# Patient Record
Sex: FEMALE | Race: WHITE | Employment: UNEMPLOYED | URBAN - NONMETROPOLITAN AREA
[De-identification: names, ages, dates, MRNs, and addresses within clinical notes are randomized per-mention and may not be internally consistent; named-entity substitution may affect disease eponyms.]

---

## 2018-11-01 ENCOUNTER — HOSPITAL ENCOUNTER (OUTPATIENT)
Dept: WOMENS IMAGING | Age: 58
Discharge: HOME | End: 2018-11-01
Payer: COMMERCIAL

## 2018-11-01 DIAGNOSIS — Z12.31 VISIT FOR SCREENING MAMMOGRAM: ICD-10-CM

## 2018-11-01 PROCEDURE — 77067 SCR MAMMO BI INCL CAD: CPT

## 2019-10-25 RX ORDER — PHENOL 1.4 %
600 AEROSOL, SPRAY (ML) MUCOUS MEMBRANE 2 TIMES DAILY WITH MEALS
COMMUNITY

## 2019-10-25 RX ORDER — MULTIPLE VITAMINS W/ MINERALS TAB 9MG-400MCG
1 TAB ORAL DAILY
COMMUNITY

## 2019-10-25 RX ORDER — THYROID, PORCINE 60 MG/1
65 TABLET ORAL DAILY
COMMUNITY

## 2019-10-25 RX ORDER — METFORMIN HYDROCHLORIDE 500 MG/1
500 TABLET, EXTENDED RELEASE ORAL
COMMUNITY
End: 2020-12-10

## 2019-10-25 RX ORDER — ACETAMINOPHEN 325 MG/1
650 TABLET ORAL EVERY 6 HOURS PRN
COMMUNITY

## 2019-10-28 ENCOUNTER — OFFICE VISIT (OUTPATIENT)
Dept: CARDIOLOGY | Facility: CLINIC | Age: 59
End: 2019-10-28

## 2019-10-28 VITALS
BODY MASS INDEX: 29.77 KG/M2 | DIASTOLIC BLOOD PRESSURE: 70 MMHG | OXYGEN SATURATION: 99 % | HEART RATE: 106 BPM | HEIGHT: 63 IN | SYSTOLIC BLOOD PRESSURE: 110 MMHG | WEIGHT: 168 LBS

## 2019-10-28 DIAGNOSIS — R01.1 HEART MURMUR: Primary | ICD-10-CM

## 2019-10-28 DIAGNOSIS — D69.6 THROMBOCYTOPENIA (HCC): Primary | ICD-10-CM

## 2019-10-28 PROCEDURE — 99203 OFFICE O/P NEW LOW 30 MIN: CPT | Performed by: INTERNAL MEDICINE

## 2019-10-28 PROCEDURE — 93000 ELECTROCARDIOGRAM COMPLETE: CPT | Performed by: INTERNAL MEDICINE

## 2019-10-28 RX ORDER — OMEPRAZOLE 20 MG/1
20 CAPSULE, DELAYED RELEASE ORAL DAILY
COMMUNITY
End: 2019-10-29

## 2019-10-28 NOTE — PROGRESS NOTES
"    Subjective:     Encounter Date:10/28/2019      Patient ID: Lucía Wolff is a 59 y.o. female who is referred for further evaluation after abnormal liver function tests were noted, prompting a GI work-up, ultimately being referred here for possible right heart failure.    Referring provider: ANAIS Galvin  Reason for referral: Heart failure    Chief Complaint: \" I had elevated liver function tests.\"    History of Present Illness     This is a 59-year-old female who was found to have elevated liver function test and underwent a work-up for this with negative liver biopsy, thought to possibly have elevated LFTs due to right-sided heart failure.  Therefore, cardiology referral was placed.  The patient says that at this time she has been doing well.  She does not have any peripheral edema.  She does not have any abdominal swelling.  She denies orthopnea, PND.  No lightheadedness, dizziness, syncope.  No chest pain.  She also denies any significant shortness of breath or dyspnea on exertion.  She denies ever having pulmonary hypertension, pulmonary embolism.  She does note a history of a cardiac murmur.  This has not been followed in the past.  Currently, she says that she has been feeling well and doing well.    The following portions of the patient's history were reviewed and updated as appropriate: allergies, current medications, past family history, past medical history, past social history, past surgical history and problem list.     Past Medical History:   Diagnosis Date   • Diabetes mellitus (CMS/HCC)    • Disease of thyroid gland    • Fatty liver    • Heart murmur    • Heart murmur 10/28/2019   • Leukopenia    • Thrombocytopenia (CMS/HCC)      Past Surgical History:   Procedure Laterality Date   • CHOLECYSTECTOMY     • HYSTERECTOMY  2003    PARTIAL   • LIVER BIOPSY  08/12/2019       Current Outpatient Medications:   •  acetaminophen (TYLENOL) 325 MG tablet, Take 650 mg by mouth Every 6 (Six) Hours As " Needed for Mild Pain ., Disp: , Rfl:   •  calcium carbonate (OS-MITRA) 600 MG tablet, Take 600 mg by mouth 2 (Two) Times a Day With Meals., Disp: , Rfl:   •  metFORMIN ER (GLUCOPHAGE-XR) 500 MG 24 hr tablet, Take 500 mg by mouth Daily With Breakfast., Disp: , Rfl:   •  Multiple Vitamins-Minerals (MULTIVITAMIN WITH MINERALS) tablet tablet, Take 1 tablet by mouth Daily., Disp: , Rfl:   •  Omega-3 300 MG capsule, Take 1,200 mg by mouth Daily., Disp: , Rfl:   •  Thyroid 65 MG PO tablet, Take 65 mg by mouth Daily., Disp: , Rfl:   •  VIT B12-METHIONINE-INOS-CHOL IM, Inject 1 mL into the appropriate muscle as directed by prescriber Every 30 (Thirty) Days., Disp: , Rfl:     No Known Allergies    Social History     Tobacco Use   • Smoking status: Never Smoker   • Smokeless tobacco: Never Used   • Tobacco comment: has tried it but did not keep smoking   Substance Use Topics   • Alcohol use: Yes     Comment: very rare     Family History   Problem Relation Age of Onset   • Alzheimer's disease Mother    • Diabetes Father    • Stroke Father    • Cardiomyopathy Father    • Heart attack Father    • Heart disease Father    • Hypertension Father    • Cancer Brother    • Diabetes Brother    • Diabetes Brother    • Hypertension Brother    • No Known Problems Brother      Review of Systems   Constitution: Negative for chills, fever, night sweats and weight loss.   HENT: Negative for congestion and hearing loss.    Eyes: Negative for blurred vision and pain.   Cardiovascular: Negative for chest pain, claudication, dyspnea on exertion, leg swelling, orthopnea, palpitations, paroxysmal nocturnal dyspnea and syncope.   Respiratory: Negative for cough, hemoptysis, shortness of breath and wheezing.    Endocrine: Negative for cold intolerance, heat intolerance, polydipsia and polyuria.   Hematologic/Lymphatic: Negative for adenopathy and bleeding problem. Does not bruise/bleed easily.   Skin: Negative for color change, poor wound healing and  rash.   Musculoskeletal: Negative for arthritis, back pain, joint pain, joint swelling, myalgias and neck pain.   Gastrointestinal: Negative for abdominal pain, change in bowel habit, constipation, diarrhea, heartburn, hematochezia, melena, nausea and vomiting.   Genitourinary: Negative for dysuria, frequency, hematuria and nocturia.   Neurological: Negative for dizziness, focal weakness, headaches, light-headedness, loss of balance and numbness.   Psychiatric/Behavioral: Negative for altered mental status, memory loss and substance abuse.   Allergic/Immunologic: Negative for hives and persistent infections.       ECG 12 Lead  Date/Time: 10/28/2019 3:33 PM  Performed by: Epifanio Rangel MD  Authorized by: Epifanio Rangel MD   Previous ECG: no previous ECG available  Rhythm: sinus rhythm  Rate: normal  Conduction: conduction normal  ST Segments: ST segments normal  QRS axis: normal  Other findings: poor R wave progression    Clinical impression: non-specific ECG             Objective:     Physical Exam   Constitutional: She is oriented to person, place, and time. Vital signs are normal. She appears well-developed and well-nourished. She is cooperative.  Non-toxic appearance. No distress.   HENT:   Head: Normocephalic and atraumatic.   Right Ear: External ear normal.   Left Ear: External ear normal.   Nose: Nose normal.   Mouth/Throat: Uvula is midline, oropharynx is clear and moist and mucous membranes are normal. Mucous membranes are not pale, not dry and not cyanotic. No oropharyngeal exudate.   Eyes: EOM and lids are normal. Pupils are equal, round, and reactive to light.   Neck: Normal range of motion. Neck supple. No hepatojugular reflux and no JVD present. Carotid bruit is not present. No tracheal deviation and no edema present. No thyroid mass and no thyromegaly present.   Cardiovascular: Normal rate, regular rhythm, S1 normal, S2 normal, intact distal pulses and normal pulses.  No  "extrasystoles are present. PMI is not displaced. Exam reveals no gallop and no friction rub.   Murmur heard.   Blowing holosystolic murmur is present at the apex.  Pulses:       Radial pulses are 2+ on the right side, and 2+ on the left side.        Femoral pulses are 2+ on the right side, and 2+ on the left side.       Dorsalis pedis pulses are 2+ on the right side, and 2+ on the left side.        Posterior tibial pulses are 2+ on the right side, and 2+ on the left side.   Pulmonary/Chest: Effort normal and breath sounds normal. No accessory muscle usage. No respiratory distress. She has no wheezes. She has no rales. She exhibits no tenderness.   Abdominal: Soft. Normal appearance and bowel sounds are normal. She exhibits no distension, no abdominal bruit and no pulsatile midline mass. There is no hepatosplenomegaly. There is no tenderness.   Musculoskeletal: Normal range of motion. She exhibits no edema, tenderness or deformity.   Lymphadenopathy:     She has no cervical adenopathy.   Neurological: She is oriented to person, place, and time. She has normal strength. No cranial nerve deficit.   Skin: Skin is warm, dry and intact. No rash noted. She is not diaphoretic. No cyanosis or erythema. Nails show no clubbing.   Psychiatric: She has a normal mood and affect. Her speech is normal and behavior is normal. Thought content normal.   Vitals reviewed.    /70   Pulse 106   Ht 160 cm (63\")   Wt 76.2 kg (168 lb)   SpO2 99%   BMI 29.76 kg/m²     Data/lab Review:     I did review referral records.  The referral was placed due to an abnormal finding on imaging.  Unfortunately, the imaging did not accompany the patient.  There is a mention in the note that the patient was also found to have thrombocytopenia and that a liver biopsy showed no advanced liver disease and no cirrhosis.  Also, in the history of the present illness on the office note from 9/13/2019, it indicates the patient had pathology consistent " with minimal steatosis, less than 5%.  Also, abdominal ultrasound was noted to show no biliary ductal dilatation and possible portal hypertension with portal and splenic veins appearing borderline prominent, suggestive of portal hypertension.      Assessment:          Diagnosis Plan   1. Heart murmur  Adult Transthoracic Echo Complete W/ Cont if Necessary Per Protocol    ECG 12 Lead          Plan:       This patient was referred due to abnormal imaging and a concern for possible right heart failure.  On the clinical examination today in office, the patient exhibits no signs of right heart failure.  She does not have any evidence of elevated jugular venous pulsation, no hepatojugular reflux, no peripheral edema, no abdominal distention or suggestion of ascites.  Therefore, I would not necessarily think that the patient has right heart failure.  However, I did not receive any imaging, and I do not think that the patient has had an echocardiogram and the patient does not recall having an echocardiogram.  Therefore, I do think that an echocardiogram is reasonable to evaluate cardiac structure and function, especially in the setting of a previous cardiac murmur.  I did appreciate a soft murmur today that does not necessarily sound pathologic.  With an echocardiogram, we will be able to evaluate right heart size and function.  If the patient's right heart is of normal size and function, I would not feel that any further cardiac work-up would be indicated.  If there is right heart dilation or suggestion of possible pulmonary hypertension, we may consider right heart catheterization.  Further plans will be pending the results of the patient's echocardiogram.    Patient's Body mass index is 29.76 kg/m². BMI is above normal parameters. Recommendations include: exercise counseling and nutrition counseling.    Follow-up: Pending the results the patient's echocardiogram.

## 2019-10-29 ENCOUNTER — APPOINTMENT (OUTPATIENT)
Dept: LAB | Facility: HOSPITAL | Age: 59
End: 2019-10-29

## 2019-10-29 ENCOUNTER — CONSULT (OUTPATIENT)
Dept: ONCOLOGY | Facility: CLINIC | Age: 59
End: 2019-10-29

## 2019-10-29 ENCOUNTER — TELEPHONE (OUTPATIENT)
Dept: ONCOLOGY | Facility: CLINIC | Age: 59
End: 2019-10-29

## 2019-10-29 VITALS
WEIGHT: 167.5 LBS | DIASTOLIC BLOOD PRESSURE: 65 MMHG | HEART RATE: 91 BPM | OXYGEN SATURATION: 96 % | HEIGHT: 63 IN | SYSTOLIC BLOOD PRESSURE: 100 MMHG | RESPIRATION RATE: 16 BRPM | BODY MASS INDEX: 29.68 KG/M2 | TEMPERATURE: 99.2 F

## 2019-10-29 DIAGNOSIS — D69.6 THROMBOCYTOPENIA (HCC): Primary | ICD-10-CM

## 2019-10-29 LAB
ALBUMIN SERPL-MCNC: 4.8 G/DL (ref 3.5–5.2)
ALBUMIN/GLOB SERPL: 2.1 G/DL
ALP SERPL-CCNC: 77 U/L (ref 39–117)
ALT SERPL W P-5'-P-CCNC: 19 U/L (ref 1–33)
ANION GAP SERPL CALCULATED.3IONS-SCNC: 14 MMOL/L (ref 5–15)
AST SERPL-CCNC: 15 U/L (ref 1–32)
BASOPHILS # BLD AUTO: 0.01 10*3/MM3 (ref 0–0.2)
BASOPHILS NFR BLD AUTO: 0.2 % (ref 0–1.5)
BILIRUB SERPL-MCNC: 1.8 MG/DL (ref 0.2–1.2)
BUN BLD-MCNC: 10 MG/DL (ref 6–20)
BUN/CREAT SERPL: 16.4 (ref 7–25)
CALCIUM SPEC-SCNC: 9.2 MG/DL (ref 8.6–10.5)
CHLORIDE SERPL-SCNC: 102 MMOL/L (ref 98–107)
CO2 SERPL-SCNC: 27 MMOL/L (ref 22–29)
CREAT BLD-MCNC: 0.61 MG/DL (ref 0.57–1)
CYTOLOGIST CVX/VAG CYTO: NORMAL
DEPRECATED RDW RBC AUTO: 47.3 FL (ref 37–54)
EOSINOPHIL # BLD AUTO: 0.07 10*3/MM3 (ref 0–0.4)
EOSINOPHIL NFR BLD AUTO: 1.3 % (ref 0.3–6.2)
ERYTHROCYTE [DISTWIDTH] IN BLOOD BY AUTOMATED COUNT: 15 % (ref 12.3–15.4)
FERRITIN SERPL-MCNC: 418.8 NG/ML (ref 13–150)
GFR SERPL CREATININE-BSD FRML MDRD: 101 ML/MIN/1.73
GLOBULIN UR ELPH-MCNC: 2.3 GM/DL
GLUCOSE BLD-MCNC: 151 MG/DL (ref 65–99)
HCT VFR BLD AUTO: 34.7 % (ref 34–46.6)
HGB BLD-MCNC: 12 G/DL (ref 12–15.9)
LYMPHOCYTES # BLD AUTO: 1.08 10*3/MM3 (ref 0.7–3.1)
LYMPHOCYTES NFR BLD AUTO: 20 % (ref 19.6–45.3)
MCH RBC QN AUTO: 29.6 PG (ref 26.6–33)
MCHC RBC AUTO-ENTMCNC: 34.6 G/DL (ref 31.5–35.7)
MCV RBC AUTO: 85.7 FL (ref 79–97)
MONOCYTES # BLD AUTO: 0.42 10*3/MM3 (ref 0.1–0.9)
MONOCYTES NFR BLD AUTO: 7.8 % (ref 5–12)
NEUTROPHILS # BLD AUTO: 3.8 10*3/MM3 (ref 1.7–7)
NEUTROPHILS NFR BLD AUTO: 70.3 % (ref 42.7–76)
PATH INTERP BLD-IMP: NORMAL
PLATELET # BLD AUTO: 125 10*3/MM3 (ref 140–450)
PMV BLD AUTO: 10.8 FL (ref 6–12)
POTASSIUM BLD-SCNC: 4 MMOL/L (ref 3.5–5.2)
PROT SERPL-MCNC: 7.1 G/DL (ref 6–8.5)
RBC # BLD AUTO: 4.05 10*6/MM3 (ref 3.77–5.28)
SODIUM BLD-SCNC: 143 MMOL/L (ref 136–145)
WBC NRBC COR # BLD: 5.4 10*3/MM3 (ref 3.4–10.8)

## 2019-10-29 PROCEDURE — 85060 BLOOD SMEAR INTERPRETATION: CPT | Performed by: INTERNAL MEDICINE

## 2019-10-29 PROCEDURE — 80053 COMPREHEN METABOLIC PANEL: CPT | Performed by: INTERNAL MEDICINE

## 2019-10-29 PROCEDURE — 85025 COMPLETE CBC W/AUTO DIFF WBC: CPT | Performed by: INTERNAL MEDICINE

## 2019-10-29 PROCEDURE — 36415 COLL VENOUS BLD VENIPUNCTURE: CPT | Performed by: INTERNAL MEDICINE

## 2019-10-29 PROCEDURE — 99205 OFFICE O/P NEW HI 60 MIN: CPT | Performed by: INTERNAL MEDICINE

## 2019-10-29 PROCEDURE — 82728 ASSAY OF FERRITIN: CPT | Performed by: INTERNAL MEDICINE

## 2019-10-29 NOTE — TELEPHONE ENCOUNTER
----- Message from Terra Warren MD sent at 10/29/2019  2:27 PM CDT -----  plz get her egd, scope and liver biopsy for me. Thanks.

## 2019-10-29 NOTE — PROGRESS NOTES
[facility]  HEMATOLOGY & ONCOLOGY        Subjective     VISIT DIAGNOSIS:   Encounter Diagnosis   Name Primary?   • Thrombocytopenia (CMS/HCC) Yes       REASON FOR VISIT:     Chief Complaint   Patient presents with   • thrombocytopenia     initial visit   • Dep;ression Screening     no depression        HEMATOLOGY / ONCOLOGY HISTORY:    No history exists.     [No treatment plan]  Cancer Staging Information:  Cancer Staging  No matching staging information was found for the patient.      INTERVAL HISTORY  Patient ID: Lucía Wolff is a 59 y.o. year old female referred to me for thrombocytopenia by her gastro. Per pt s/p egd 2 months ago and c-scope a year ago. S/p liver biopsy per pt fatty liver. Will obtain records. She denies n/v, ELVIN, abdominal pain, sob, cp, night sweats, unintentional weight loss, focal weakness. Ros unremarkable. PE non focal.        Review of Systems     See above. Otherwise neg    Medications:    Current Outpatient Medications   Medication Sig Dispense Refill   • acetaminophen (TYLENOL) 325 MG tablet Take 650 mg by mouth Every 6 (Six) Hours As Needed for Mild Pain .     • calcium carbonate (OS-MITRA) 600 MG tablet Take 600 mg by mouth 2 (Two) Times a Day With Meals.     • metFORMIN ER (GLUCOPHAGE-XR) 500 MG 24 hr tablet Take 500 mg by mouth Daily With Breakfast.     • Multiple Vitamins-Minerals (MULTIVITAMIN WITH MINERALS) tablet tablet Take 1 tablet by mouth Daily.     • Omega-3 300 MG capsule Take 1,200 mg by mouth Daily.     • Thyroid 65 MG PO tablet Take 65 mg by mouth Daily.     • VIT B12-METHIONINE-INOS-CHOL IM Inject 1 mL into the appropriate muscle as directed by prescriber Every 30 (Thirty) Days.       No current facility-administered medications for this visit.        ALLERGIES:  No Known Allergies    Objective      Vitals:    10/29/19 1038   BP: 100/65   Pulse: 91   Resp: 16   Temp: 99.2 °F (37.3 °C)   SpO2: 96%       Current Status 10/29/2019   ECOG score 0        General Appearance: Patient is awake, alert, oriented and in no acute distress. Patient is welldeveloped, wellnourished, and appears stated age.  HEENT: Normocephalic. Sclerae clear, conjunctiva pink, extraocular movements intact, pupils, round, reactive to light and  accommodation. Mouth and throat are clear with moist oral mucosa.  NECK: Supple, no jugular venous distention, thyroid not enlarged.  LYMPH: No cervical, supraclavicular, axillary, or inguinal lymphadenopathy.  CHEST: Equal bilateral expansion, AP  diameter normal, resonant percussion note  LUNGS: Good air movement, no rales, rhonchi, rubs or wheezes with auscultation  CARDIO: Regular sinus rhythm, no murmurs, gallops or rubs.  ABDOMEN: Nondistended, soft, No tenderness, no guarding, no rebound, No hepatosplenomegaly. No abdominal masses. Bowel sounds positive. No hernia  GENITALIA: Not examined.  BREASTS: Not examined.  MUSKEL: No joint swelling, decreased motion, or inflammation  EXTREMS: No edema, clubbing, cyanosis, No varicose veins.  NEURO: Grossly nonfocal, Gait is coordinated and smooth, Cognition is preserved.  SKIN: No rashes, no ecchymoses, no petechia.  PSYCH: Oriented to time, place and person. Memory is preserved. Mood and affect appear normal      RECENT LABS:  Consult on 10/29/2019   Component Date Value Ref Range Status   • Ferritin 10/29/2019 418.80* 13.00 - 150.00 ng/mL Final   Orders Only on 10/28/2019   Component Date Value Ref Range Status   • Glucose 10/29/2019 151* 65 - 99 mg/dL Final   • BUN 10/29/2019 10  6 - 20 mg/dL Final   • Creatinine 10/29/2019 0.61  0.57 - 1.00 mg/dL Final   • Sodium 10/29/2019 143  136 - 145 mmol/L Final   • Potassium 10/29/2019 4.0  3.5 - 5.2 mmol/L Final   • Chloride 10/29/2019 102  98 - 107 mmol/L Final   • CO2 10/29/2019 27.0  22.0 - 29.0 mmol/L Final   • Calcium 10/29/2019 9.2  8.6 - 10.5 mg/dL Final   • Total Protein 10/29/2019 7.1  6.0 - 8.5 g/dL Final   • Albumin 10/29/2019 4.80  3.50 -  5.20 g/dL Final   • ALT (SGPT) 10/29/2019 19  1 - 33 U/L Final   • AST (SGOT) 10/29/2019 15  1 - 32 U/L Final   • Alkaline Phosphatase 10/29/2019 77  39 - 117 U/L Final   • Total Bilirubin 10/29/2019 1.8* 0.2 - 1.2 mg/dL Final   • eGFR Non African Amer 10/29/2019 101  >60 mL/min/1.73 Final   • Globulin 10/29/2019 2.3  gm/dL Final   • A/G Ratio 10/29/2019 2.1  g/dL Final   • BUN/Creatinine Ratio 10/29/2019 16.4  7.0 - 25.0 Final   • Anion Gap 10/29/2019 14.0  5.0 - 15.0 mmol/L Final   • WBC 10/29/2019 5.40  3.40 - 10.80 10*3/mm3 Final   • RBC 10/29/2019 4.05  3.77 - 5.28 10*6/mm3 Final   • Hemoglobin 10/29/2019 12.0  12.0 - 15.9 g/dL Final   • Hematocrit 10/29/2019 34.7  34.0 - 46.6 % Final   • MCV 10/29/2019 85.7  79.0 - 97.0 fL Final   • MCH 10/29/2019 29.6  26.6 - 33.0 pg Final   • MCHC 10/29/2019 34.6  31.5 - 35.7 g/dL Final   • RDW 10/29/2019 15.0  12.3 - 15.4 % Final   • RDW-SD 10/29/2019 47.3  37.0 - 54.0 fl Final   • MPV 10/29/2019 10.8  6.0 - 12.0 fL Final   • Platelets 10/29/2019 125* 140 - 450 10*3/mm3 Final   • Neutrophil % 10/29/2019 70.3  42.7 - 76.0 % Final   • Lymphocyte % 10/29/2019 20.0  19.6 - 45.3 % Final   • Monocyte % 10/29/2019 7.8  5.0 - 12.0 % Final   • Eosinophil % 10/29/2019 1.3  0.3 - 6.2 % Final   • Basophil % 10/29/2019 0.2  0.0 - 1.5 % Final   • Neutrophils, Absolute 10/29/2019 3.80  1.70 - 7.00 10*3/mm3 Final   • Lymphocytes, Absolute 10/29/2019 1.08  0.70 - 3.10 10*3/mm3 Final   • Monocytes, Absolute 10/29/2019 0.42  0.10 - 0.90 10*3/mm3 Final   • Eosinophils, Absolute 10/29/2019 0.07  0.00 - 0.40 10*3/mm3 Final   • Basophils, Absolute 10/29/2019 0.01  0.00 - 0.20 10*3/mm3 Final   • Performed by: 10/29/2019 Salome Burrell M.D.   Final   • Pathologist Interpretation 10/29/2019 Neutrophils 76%  Bands 1%  Lymphocytes 16%  Monocytes 5%  Eosinophils 2%  Peripheral thrombocytopenia, mild.    No schistocytes seen.  No platelet clumps seen.  No morulae seen in granulocytes or monocytes.    Final       RADIOLOGY:  No results found.         Assessment/Plan 59 yof referred for thrombocytopenia    1.Thrombocytopenia: labs reviewed with pt plt 125, wbc 5.40, Hg 12.0  Reviewed the etiology of thrombocytopenia, nutritional vs HSM, vs primary bone marrow issues  -check PBS, ferritin, b12, folate, bone marrow bx, US abdomen to r/o HSM  -      2. DM: on metformin    3. Hypothyroidism: on thyroid medication         Time Spent: 60 minutes; greater than  50% of time was spent in patient counseling and care coordination.     Terra Warren MD    10/29/2019    2:26 PM

## 2019-10-31 ENCOUNTER — APPOINTMENT (OUTPATIENT)
Dept: ULTRASOUND IMAGING | Facility: HOSPITAL | Age: 59
End: 2019-10-31

## 2019-10-31 ENCOUNTER — HOSPITAL ENCOUNTER (OUTPATIENT)
Dept: CT IMAGING | Facility: HOSPITAL | Age: 59
Discharge: HOME | End: 2019-10-31
Admitting: INTERNAL MEDICINE

## 2019-10-31 VITALS
DIASTOLIC BLOOD PRESSURE: 65 MMHG | SYSTOLIC BLOOD PRESSURE: 109 MMHG | TEMPERATURE: 98.4 F | OXYGEN SATURATION: 98 % | HEART RATE: 85 BPM | WEIGHT: 167.6 LBS | BODY MASS INDEX: 27.92 KG/M2 | HEIGHT: 65 IN | RESPIRATION RATE: 18 BRPM

## 2019-10-31 DIAGNOSIS — D69.6 THROMBOCYTOPENIA (HCC): ICD-10-CM

## 2019-10-31 LAB
BASOPHILS # BLD AUTO: 0 10*3/MM3 (ref 0–0.2)
BASOPHILS NFR BLD AUTO: 0 % (ref 0–1.5)
DEPRECATED RDW RBC AUTO: 46.2 FL (ref 37–54)
EOSINOPHIL # BLD AUTO: 0.05 10*3/MM3 (ref 0–0.4)
EOSINOPHIL NFR BLD AUTO: 1.3 % (ref 0.3–6.2)
ERYTHROCYTE [DISTWIDTH] IN BLOOD BY AUTOMATED COUNT: 14.9 % (ref 12.3–15.4)
HCT VFR BLD AUTO: 33.7 % (ref 34–46.6)
HGB BLD-MCNC: 11.9 G/DL (ref 12–15.9)
IMM GRANULOCYTES # BLD AUTO: 0.02 10*3/MM3 (ref 0–0.05)
IMM GRANULOCYTES NFR BLD AUTO: 0.5 % (ref 0–0.5)
LYMPHOCYTES # BLD AUTO: 0.85 10*3/MM3 (ref 0.7–3.1)
LYMPHOCYTES NFR BLD AUTO: 21.7 % (ref 19.6–45.3)
MCH RBC QN AUTO: 30.1 PG (ref 26.6–33)
MCHC RBC AUTO-ENTMCNC: 35.3 G/DL (ref 31.5–35.7)
MCV RBC AUTO: 85.3 FL (ref 79–97)
MONOCYTES # BLD AUTO: 0.26 10*3/MM3 (ref 0.1–0.9)
MONOCYTES NFR BLD AUTO: 6.6 % (ref 5–12)
NEUTROPHILS # BLD AUTO: 2.74 10*3/MM3 (ref 1.7–7)
NEUTROPHILS NFR BLD AUTO: 69.9 % (ref 42.7–76)
NRBC BLD AUTO-RTO: 0 /100 WBC (ref 0–0.2)
PLATELET # BLD AUTO: 122 10*3/MM3 (ref 140–450)
PMV BLD AUTO: 10.8 FL (ref 6–12)
RBC # BLD AUTO: 3.95 10*6/MM3 (ref 3.77–5.28)
WBC NRBC COR # BLD: 3.92 10*3/MM3 (ref 3.4–10.8)

## 2019-10-31 PROCEDURE — 88305 TISSUE EXAM BY PATHOLOGIST: CPT | Performed by: INTERNAL MEDICINE

## 2019-10-31 PROCEDURE — 25010000002 MIDAZOLAM PER 1 MG: Performed by: RADIOLOGY

## 2019-10-31 PROCEDURE — 25010000002 FENTANYL CITRATE (PF) 100 MCG/2ML SOLUTION: Performed by: RADIOLOGY

## 2019-10-31 PROCEDURE — 25010000003 LIDOCAINE 1 % SOLUTION: Performed by: RADIOLOGY

## 2019-10-31 PROCEDURE — 88313 SPECIAL STAINS GROUP 2: CPT | Performed by: INTERNAL MEDICINE

## 2019-10-31 PROCEDURE — 77012 CT SCAN FOR NEEDLE BIOPSY: CPT

## 2019-10-31 PROCEDURE — 85025 COMPLETE CBC W/AUTO DIFF WBC: CPT | Performed by: INTERNAL MEDICINE

## 2019-10-31 PROCEDURE — 88311 DECALCIFY TISSUE: CPT | Performed by: INTERNAL MEDICINE

## 2019-10-31 RX ORDER — MIDAZOLAM HYDROCHLORIDE 1 MG/ML
INJECTION INTRAMUSCULAR; INTRAVENOUS
Status: COMPLETED | OUTPATIENT
Start: 2019-10-31 | End: 2019-10-31

## 2019-10-31 RX ORDER — FENTANYL CITRATE 50 UG/ML
INJECTION, SOLUTION INTRAMUSCULAR; INTRAVENOUS
Status: COMPLETED | OUTPATIENT
Start: 2019-10-31 | End: 2019-10-31

## 2019-10-31 RX ORDER — OMEPRAZOLE 20 MG/1
20 CAPSULE, DELAYED RELEASE ORAL DAILY PRN
COMMUNITY
End: 2022-01-17

## 2019-10-31 RX ORDER — LIDOCAINE HYDROCHLORIDE 10 MG/ML
INJECTION, SOLUTION INFILTRATION; PERINEURAL
Status: COMPLETED | OUTPATIENT
Start: 2019-10-31 | End: 2019-10-31

## 2019-10-31 RX ADMIN — LIDOCAINE HYDROCHLORIDE 10 ML: 10 INJECTION, SOLUTION INFILTRATION; PERINEURAL at 11:27

## 2019-10-31 RX ADMIN — MIDAZOLAM 1 MG: 1 INJECTION INTRAMUSCULAR; INTRAVENOUS at 11:26

## 2019-10-31 RX ADMIN — FENTANYL CITRATE 100 MCG: 50 INJECTION, SOLUTION INTRAMUSCULAR; INTRAVENOUS at 11:26

## 2019-11-04 ENCOUNTER — HOSPITAL ENCOUNTER (OUTPATIENT)
Dept: CARDIOLOGY | Facility: HOSPITAL | Age: 59
Discharge: HOME | End: 2019-11-04
Admitting: INTERNAL MEDICINE

## 2019-11-04 ENCOUNTER — HOSPITAL ENCOUNTER (OUTPATIENT)
Dept: ULTRASOUND IMAGING | Facility: HOSPITAL | Age: 59
Discharge: HOME | End: 2019-11-04

## 2019-11-04 VITALS
SYSTOLIC BLOOD PRESSURE: 126 MMHG | DIASTOLIC BLOOD PRESSURE: 52 MMHG | BODY MASS INDEX: 27.92 KG/M2 | HEIGHT: 65 IN | WEIGHT: 167.55 LBS

## 2019-11-04 DIAGNOSIS — D69.6 THROMBOCYTOPENIA (HCC): ICD-10-CM

## 2019-11-04 DIAGNOSIS — R01.1 HEART MURMUR: ICD-10-CM

## 2019-11-04 PROCEDURE — 76700 US EXAM ABDOM COMPLETE: CPT

## 2019-11-04 PROCEDURE — 93306 TTE W/DOPPLER COMPLETE: CPT

## 2019-11-04 PROCEDURE — 93306 TTE W/DOPPLER COMPLETE: CPT | Performed by: INTERNAL MEDICINE

## 2019-11-08 LAB
BH CV ECHO MEAS - AO MAX PG (FULL): 2.9 MMHG
BH CV ECHO MEAS - AO MAX PG: 6.7 MMHG
BH CV ECHO MEAS - AO MEAN PG (FULL): 1 MMHG
BH CV ECHO MEAS - AO MEAN PG: 3 MMHG
BH CV ECHO MEAS - AO ROOT AREA (BSA CORRECTED): 1.5
BH CV ECHO MEAS - AO ROOT AREA: 5.7 CM^2
BH CV ECHO MEAS - AO ROOT DIAM: 2.7 CM
BH CV ECHO MEAS - AO V2 MAX: 129 CM/SEC
BH CV ECHO MEAS - AO V2 MEAN: 86.2 CM/SEC
BH CV ECHO MEAS - AO V2 VTI: 25.8 CM
BH CV ECHO MEAS - AVA(I,A): 2.9 CM^2
BH CV ECHO MEAS - AVA(I,D): 2.9 CM^2
BH CV ECHO MEAS - AVA(V,A): 2.4 CM^2
BH CV ECHO MEAS - AVA(V,D): 2.4 CM^2
BH CV ECHO MEAS - BSA(HAYCOCK): 1.9 M^2
BH CV ECHO MEAS - BSA: 1.8 M^2
BH CV ECHO MEAS - BZI_BMI: 27.8 KILOGRAMS/M^2
BH CV ECHO MEAS - BZI_METRIC_HEIGHT: 165.1 CM
BH CV ECHO MEAS - BZI_METRIC_WEIGHT: 75.8 KG
BH CV ECHO MEAS - EDV(CUBED): 84.6 ML
BH CV ECHO MEAS - EDV(MOD-SP4): 61.8 ML
BH CV ECHO MEAS - EDV(TEICH): 87.2 ML
BH CV ECHO MEAS - EF(CUBED): 72.6 %
BH CV ECHO MEAS - EF(MOD-SP4): 59.4 %
BH CV ECHO MEAS - EF(TEICH): 64.6 %
BH CV ECHO MEAS - ESV(CUBED): 23.1 ML
BH CV ECHO MEAS - ESV(MOD-SP4): 25.1 ML
BH CV ECHO MEAS - ESV(TEICH): 30.9 ML
BH CV ECHO MEAS - FS: 35.1 %
BH CV ECHO MEAS - IVS/LVPW: 0.94
BH CV ECHO MEAS - IVSD: 0.87 CM
BH CV ECHO MEAS - LA DIMENSION: 3.7 CM
BH CV ECHO MEAS - LA/AO: 1.4
BH CV ECHO MEAS - LAT PEAK E' VEL: 8.2 CM/SEC
BH CV ECHO MEAS - LV DIASTOLIC VOL/BSA (35-75): 33.7 ML/M^2
BH CV ECHO MEAS - LV MASS(C)D: 126.7 GRAMS
BH CV ECHO MEAS - LV MASS(C)DI: 69.1 GRAMS/M^2
BH CV ECHO MEAS - LV MAX PG: 3.7 MMHG
BH CV ECHO MEAS - LV MEAN PG: 2 MMHG
BH CV ECHO MEAS - LV SYSTOLIC VOL/BSA (12-30): 13.7 ML/M^2
BH CV ECHO MEAS - LV V1 MAX: 96.8 CM/SEC
BH CV ECHO MEAS - LV V1 MEAN: 66.2 CM/SEC
BH CV ECHO MEAS - LV V1 VTI: 24 CM
BH CV ECHO MEAS - LVIDD: 4.4 CM
BH CV ECHO MEAS - LVIDS: 2.9 CM
BH CV ECHO MEAS - LVLD AP4: 7.8 CM
BH CV ECHO MEAS - LVLS AP4: 5.6 CM
BH CV ECHO MEAS - LVOT AREA (M): 3.1 CM^2
BH CV ECHO MEAS - LVOT AREA: 3.1 CM^2
BH CV ECHO MEAS - LVOT DIAM: 2 CM
BH CV ECHO MEAS - LVPWD: 0.92 CM
BH CV ECHO MEAS - MED PEAK E' VEL: 5.87 CM/SEC
BH CV ECHO MEAS - MV A MAX VEL: 91.2 CM/SEC
BH CV ECHO MEAS - MV DEC TIME: 0.17 SEC
BH CV ECHO MEAS - MV E MAX VEL: 93.1 CM/SEC
BH CV ECHO MEAS - MV E/A: 1
BH CV ECHO MEAS - SI(AO): 80.6 ML/M^2
BH CV ECHO MEAS - SI(CUBED): 33.5 ML/M^2
BH CV ECHO MEAS - SI(LVOT): 41.2 ML/M^2
BH CV ECHO MEAS - SI(MOD-SP4): 20 ML/M^2
BH CV ECHO MEAS - SI(TEICH): 30.8 ML/M^2
BH CV ECHO MEAS - SV(AO): 147.7 ML
BH CV ECHO MEAS - SV(CUBED): 61.5 ML
BH CV ECHO MEAS - SV(LVOT): 75.4 ML
BH CV ECHO MEAS - SV(MOD-SP4): 36.7 ML
BH CV ECHO MEAS - SV(TEICH): 56.4 ML
BH CV ECHO MEASUREMENTS AVERAGE E/E' RATIO: 13.23
LEFT ATRIUM VOLUME INDEX: 26.7 ML/M2
LEFT ATRIUM VOLUME: 48.9 CM3
MAXIMAL PREDICTED HEART RATE: 163 BPM
STRESS TARGET HR: 139 BPM

## 2019-11-13 LAB
CYTO UR: NORMAL
LAB AP CASE REPORT: NORMAL
PATH REPORT.FINAL DX SPEC: NORMAL
PATH REPORT.GROSS SPEC: NORMAL

## 2019-11-22 ENCOUNTER — APPOINTMENT (OUTPATIENT)
Dept: LAB | Facility: HOSPITAL | Age: 59
End: 2019-11-22

## 2019-11-22 ENCOUNTER — OFFICE VISIT (OUTPATIENT)
Dept: ONCOLOGY | Facility: CLINIC | Age: 59
End: 2019-11-22

## 2019-11-22 VITALS
SYSTOLIC BLOOD PRESSURE: 122 MMHG | OXYGEN SATURATION: 97 % | RESPIRATION RATE: 16 BRPM | WEIGHT: 165.4 LBS | DIASTOLIC BLOOD PRESSURE: 68 MMHG | HEART RATE: 72 BPM | TEMPERATURE: 98.4 F | HEIGHT: 65 IN | BODY MASS INDEX: 27.56 KG/M2

## 2019-11-22 DIAGNOSIS — D69.6 THROMBOCYTOPENIA (HCC): Primary | ICD-10-CM

## 2019-11-22 LAB
ALBUMIN SERPL-MCNC: 4.9 G/DL (ref 3.5–5.2)
ALBUMIN/GLOB SERPL: 2.6 G/DL
ALP SERPL-CCNC: 75 U/L (ref 39–117)
ALT SERPL W P-5'-P-CCNC: 41 U/L (ref 1–33)
ANION GAP SERPL CALCULATED.3IONS-SCNC: 10 MMOL/L (ref 5–15)
AST SERPL-CCNC: 22 U/L (ref 1–32)
BASOPHILS # BLD AUTO: 0.01 10*3/MM3 (ref 0–0.2)
BASOPHILS NFR BLD AUTO: 0.3 % (ref 0–1.5)
BILIRUB SERPL-MCNC: 1.5 MG/DL (ref 0.2–1.2)
BUN BLD-MCNC: 12 MG/DL (ref 6–20)
BUN/CREAT SERPL: 19.7 (ref 7–25)
CALCIUM SPEC-SCNC: 9.5 MG/DL (ref 8.6–10.5)
CHLORIDE SERPL-SCNC: 104 MMOL/L (ref 98–107)
CO2 SERPL-SCNC: 27 MMOL/L (ref 22–29)
CREAT BLD-MCNC: 0.61 MG/DL (ref 0.57–1)
DEPRECATED RDW RBC AUTO: 46.2 FL (ref 37–54)
EOSINOPHIL # BLD AUTO: 0.06 10*3/MM3 (ref 0–0.4)
EOSINOPHIL NFR BLD AUTO: 2 % (ref 0.3–6.2)
ERYTHROCYTE [DISTWIDTH] IN BLOOD BY AUTOMATED COUNT: 15 % (ref 12.3–15.4)
GFR SERPL CREATININE-BSD FRML MDRD: 101 ML/MIN/1.73
GLOBULIN UR ELPH-MCNC: 1.9 GM/DL
GLUCOSE BLD-MCNC: 127 MG/DL (ref 65–99)
HCT VFR BLD AUTO: 34.8 % (ref 34–46.6)
HGB BLD-MCNC: 12 G/DL (ref 12–15.9)
HOLD SPECIMEN: NORMAL
HOLD SPECIMEN: NORMAL
LYMPHOCYTES # BLD AUTO: 0.89 10*3/MM3 (ref 0.7–3.1)
LYMPHOCYTES NFR BLD AUTO: 29.7 % (ref 19.6–45.3)
MCH RBC QN AUTO: 29.4 PG (ref 26.6–33)
MCHC RBC AUTO-ENTMCNC: 34.5 G/DL (ref 31.5–35.7)
MCV RBC AUTO: 85.3 FL (ref 79–97)
MONOCYTES # BLD AUTO: 0.18 10*3/MM3 (ref 0.1–0.9)
MONOCYTES NFR BLD AUTO: 6 % (ref 5–12)
NEUTROPHILS # BLD AUTO: 1.85 10*3/MM3 (ref 1.7–7)
NEUTROPHILS NFR BLD AUTO: 61.7 % (ref 42.7–76)
PLATELET # BLD AUTO: 111 10*3/MM3 (ref 140–450)
PMV BLD AUTO: 10.8 FL (ref 6–12)
POTASSIUM BLD-SCNC: 4.3 MMOL/L (ref 3.5–5.2)
PROT SERPL-MCNC: 6.8 G/DL (ref 6–8.5)
RBC # BLD AUTO: 4.08 10*6/MM3 (ref 3.77–5.28)
SODIUM BLD-SCNC: 141 MMOL/L (ref 136–145)
WBC NRBC COR # BLD: 3 10*3/MM3 (ref 3.4–10.8)

## 2019-11-22 PROCEDURE — 36415 COLL VENOUS BLD VENIPUNCTURE: CPT | Performed by: INTERNAL MEDICINE

## 2019-11-22 PROCEDURE — 99214 OFFICE O/P EST MOD 30 MIN: CPT | Performed by: INTERNAL MEDICINE

## 2019-11-22 PROCEDURE — 80053 COMPREHEN METABOLIC PANEL: CPT | Performed by: INTERNAL MEDICINE

## 2019-11-22 PROCEDURE — 85025 COMPLETE CBC W/AUTO DIFF WBC: CPT | Performed by: INTERNAL MEDICINE

## 2019-11-22 NOTE — PROGRESS NOTES
[facility]  HEMATOLOGY & ONCOLOGY        Subjective     VISIT DIAGNOSIS:   No diagnosis found.    REASON FOR VISIT:     Chief Complaint   Patient presents with   • Thrombocytopenia     Here for f/u        HEMATOLOGY / ONCOLOGY HISTORY:    No history exists.     [No treatment plan]  Cancer Staging Information:  Cancer Staging  No matching staging information was found for the patient.      INTERVAL HISTORY  Patient ID: Lucía Wolff is a 59 y.o. year old female referred to me for thrombocytopenia by her gastro. Per pt s/p egd 2 months ago and c-scope a year ago. S/p liver biopsy per pt fatty liver. Will obtain records. She denies n/v, ELVIN, abdominal pain, sob, cp, night sweats, unintentional weight loss, focal weakness. Ros unremarkable. PE non focal.  11/22/19: she presents to review bone marrow bx which was Normocellular marrow with maturing trilineage hematopoiesis.  No morphologic evidence of overt or advanced myelodysplasia, acute leukemia, metastatic neoplasm or plasma cell  dyscrasia.  A small monoclonal B-cell population identified by flow cytometry (approximately 2%), see comment.  Stainable iron present.  No significant fibrosis.  Denies sob/cp/n/v/fever/night sweats/weight loss/ELVIN/early satiety/abdominal pain. Ros unremarkable. PE remains the same.      Review of Systems     See above. Otherwise neg    Medications:    Current Outpatient Medications   Medication Sig Dispense Refill   • acetaminophen (TYLENOL) 325 MG tablet Take 650 mg by mouth Every 6 (Six) Hours As Needed for Mild Pain .     • calcium carbonate (OS-MITRA) 600 MG tablet Take 600 mg by mouth 2 (Two) Times a Day With Meals.     • Dextromethorphan-guaiFENesin (MUCINEX DM PO) Take 1 tablet by mouth 2 (Two) Times a Day As Needed.     • metFORMIN ER (GLUCOPHAGE-XR) 500 MG 24 hr tablet Take 500 mg by mouth Daily With Breakfast.     • Multiple Vitamins-Minerals (MULTIVITAMIN WITH MINERALS) tablet tablet Take 1 tablet by mouth  Daily.     • Omega-3 300 MG capsule Take 1,200 mg by mouth Daily.     • omeprazole (priLOSEC) 20 MG capsule Take 20 mg by mouth Daily As Needed.     • Thyroid 65 MG PO tablet Take 65 mg by mouth Daily.     • VIT B12-METHIONINE-INOS-CHOL IM Inject 1 mL into the appropriate muscle as directed by prescriber Every 30 (Thirty) Days.       No current facility-administered medications for this visit.        ALLERGIES:  No Known Allergies    Objective      Vitals:    11/22/19 1259   BP: 122/68   Pulse: 72   Resp: 16   Temp: 98.4 °F (36.9 °C)   SpO2: 97%       Current Status 11/22/2019   ECOG score 0       General Appearance: Patient is awake, alert, oriented and in no acute distress. Patient is welldeveloped, wellnourished, and appears stated age.  HEENT: Normocephalic. Sclerae clear, conjunctiva pink, extraocular movements intact, pupils, round, reactive to light and  accommodation. Mouth and throat are clear with moist oral mucosa.  NECK: Supple, no jugular venous distention, thyroid not enlarged.  LYMPH: No cervical, supraclavicular, axillary, or inguinal lymphadenopathy.  CHEST: Equal bilateral expansion, AP  diameter normal, resonant percussion note  LUNGS: Good air movement, no rales, rhonchi, rubs or wheezes with auscultation  CARDIO: Regular sinus rhythm, no murmurs, gallops or rubs.  ABDOMEN: Nondistended, soft, No tenderness, no guarding, no rebound, No hepatosplenomegaly. No abdominal masses. Bowel sounds positive. No hernia  GENITALIA: Not examined.  BREASTS: Not examined.  MUSKEL: No joint swelling, decreased motion, or inflammation  EXTREMS: No edema, clubbing, cyanosis, No varicose veins.  NEURO: Grossly nonfocal, Gait is coordinated and smooth, Cognition is preserved.  SKIN: No rashes, no ecchymoses, no petechia.  PSYCH: Oriented to time, place and person. Memory is preserved. Mood and affect appear normal      RECENT LABS:  Orders Only on 11/22/2019   Component Date Value Ref Range Status   • WBC 11/22/2019  3.00* 3.40 - 10.80 10*3/mm3 Final   • RBC 11/22/2019 4.08  3.77 - 5.28 10*6/mm3 Final   • Hemoglobin 11/22/2019 12.0  12.0 - 15.9 g/dL Final   • Hematocrit 11/22/2019 34.8  34.0 - 46.6 % Final   • MCV 11/22/2019 85.3  79.0 - 97.0 fL Final   • MCH 11/22/2019 29.4  26.6 - 33.0 pg Final   • MCHC 11/22/2019 34.5  31.5 - 35.7 g/dL Final   • RDW 11/22/2019 15.0  12.3 - 15.4 % Final   • RDW-SD 11/22/2019 46.2  37.0 - 54.0 fl Final   • MPV 11/22/2019 10.8  6.0 - 12.0 fL Final   • Platelets 11/22/2019 111* 140 - 450 10*3/mm3 Final   • Neutrophil % 11/22/2019 61.7  42.7 - 76.0 % Final   • Lymphocyte % 11/22/2019 29.7  19.6 - 45.3 % Final   • Monocyte % 11/22/2019 6.0  5.0 - 12.0 % Final   • Eosinophil % 11/22/2019 2.0  0.3 - 6.2 % Final   • Basophil % 11/22/2019 0.3  0.0 - 1.5 % Final   • Neutrophils, Absolute 11/22/2019 1.85  1.70 - 7.00 10*3/mm3 Final   • Lymphocytes, Absolute 11/22/2019 0.89  0.70 - 3.10 10*3/mm3 Final   • Monocytes, Absolute 11/22/2019 0.18  0.10 - 0.90 10*3/mm3 Final   • Eosinophils, Absolute 11/22/2019 0.06  0.00 - 0.40 10*3/mm3 Final   • Basophils, Absolute 11/22/2019 0.01  0.00 - 0.20 10*3/mm3 Final       RADIOLOGY:  Us Abdomen Complete    Result Date: 11/4/2019  Narrative: ULTRASOUND ABDOMEN COMPLETE 11/4/2019 12:22 PM CST  REASON FOR EXAM: thrombocytopenia r/o HSM; D69.6-Thrombocytopenia, unspecified   COMPARISON: None  TECHNIQUE: Multiple longitudinal and transverse real-time sonographic images of the abdomen are obtained.  FINDINGS:   LIVER: Normal in size, smooth in contour, and homogeneous in echogenicity. Hepatopedal flow is present in the portal vein No focal lesion is seen.  BILIARY: The gallbladder is surgically absent The common bile duct measures 0.39 cm in diameter. There is no evidence of intrahepatic ductal dilatation.   KIDNEYS: The right and left kidneys are normal in size, shape, orientation, and position measuring 4.6 x 4.6 x 11.6 cm and 3.7 x 3.9 x 10.4 cm, respectively. The  corticomedullary differentiation is maintained. There is no evidence of nephrolithiasis, hydronephrosis or perinephric fluid collection. No renal mass is seen. No hydroureter is seen.  PANCREAS: No focal lesion or abnormality.  SPLEEN: Spleen is mildly enlarged 6.3 x 6.4 x 17.5 cm  OTHER: No ascites is present. The visualized IVC and aorta are normal in appearance.  Scanning through the pelvis reveals anechoic urine partially distending the bladder. There are no free fluid collections.      Impression: 1. Spleen is mildly enlarged.  This report was finalized on 11/04/2019 13:24 by Dr. Chapo Kelly MD.    Ct Guided Biopsy Bone Marrow    Result Date: 10/31/2019  Narrative: CT GUIDED BIOPSY BONE MARROW- 10/31/2019 11:10 AM CDT  Indication: thrombocytopenia. r/o leuk/lymph, MDS; D69.6-Thrombocytopenia, unspecified  Consent: An informed written consent was obtained from the patient after discussing the risks, benefits, potential complications and alternatives. All questions answered to the patient's satisfaction.   Complications: None.  Medications: Versed 1 mg IV, Fentanyl 100 mcg IV.  DLP: 353 mGy cm  Estimated Blood Loss: Less than 5 ml.  Procedure: An informed consent was obtained as above. Intravenous conscious sedation was administered with blood pressure, EKG and pulse oximeter monitoring by nursing staff throughout the procedure. Prior to sterile preparation and local anesthetic, noninfused axial CT scans were obtained. The optimal site for biopsy over the left posterior ilium was marked. A 12-gauge bone biopsy needle was inserted into the left posterior ilium. Approximately 10 mL of bone marrow was aspirated. Subsequently, a 1-2 cm core biopsy was obtained.  No immediate complications.      Impression: Impression: Successful CT guided bone marrow aspirate and bone biopsy obtained from the left posterior ilium.   This report was finalized on 10/31/2019 12:49 by Dr Jan Colón, .           Assessment/Plan 59  yof referred for thrombocytopenia    1.Thrombocytopenia: US abdomen normal liver, and splenomegaly. However pt reported having liver bx and she was told she has fatty liver. Suspect low platelet from low TPO and splenomegaly  -NTD. Monitor for now  -bone marrow bx negative for obvious leuk/lymp but a small population of  monoclonal B-cell population identified by flow cytometry (approximately 2%). This is not clinically meaningful at this point  -will continue monitoring due to heterogeneity of the bone marrow if cbc worsens, will repeat bone marrow biopsy  rtc in 3 months.    2. DM: on metformin    3. Hypothyroidism: on thyroid medication           Terra Warren MD    11/22/2019    1:13 PM

## 2019-11-27 ENCOUNTER — APPOINTMENT (OUTPATIENT)
Dept: LAB | Facility: HOSPITAL | Age: 59
End: 2019-11-27

## 2020-03-06 ENCOUNTER — APPOINTMENT (OUTPATIENT)
Dept: LAB | Facility: HOSPITAL | Age: 60
End: 2020-03-06

## 2020-03-25 ENCOUNTER — APPOINTMENT (OUTPATIENT)
Dept: LAB | Facility: HOSPITAL | Age: 60
End: 2020-03-25

## 2020-06-17 DIAGNOSIS — D69.6 THROMBOCYTOPENIA (HCC): Primary | ICD-10-CM

## 2020-06-23 ENCOUNTER — OFFICE VISIT (OUTPATIENT)
Dept: ONCOLOGY | Facility: CLINIC | Age: 60
End: 2020-06-23

## 2020-06-23 VITALS
SYSTOLIC BLOOD PRESSURE: 125 MMHG | BODY MASS INDEX: 26.56 KG/M2 | OXYGEN SATURATION: 98 % | RESPIRATION RATE: 18 BRPM | WEIGHT: 159.4 LBS | HEIGHT: 65 IN | DIASTOLIC BLOOD PRESSURE: 62 MMHG | HEART RATE: 79 BPM | TEMPERATURE: 98.9 F

## 2020-06-23 DIAGNOSIS — H65.191 OTHER NON-RECURRENT ACUTE NONSUPPURATIVE OTITIS MEDIA OF RIGHT EAR: ICD-10-CM

## 2020-06-23 DIAGNOSIS — D69.6 THROMBOCYTOPENIA (HCC): Primary | ICD-10-CM

## 2020-06-23 PROCEDURE — 99214 OFFICE O/P EST MOD 30 MIN: CPT | Performed by: INTERNAL MEDICINE

## 2020-06-23 RX ORDER — AMOXICILLIN AND CLAVULANATE POTASSIUM 500; 125 MG/1; MG/1
1 TABLET, FILM COATED ORAL
Qty: 14 TABLET | Refills: 0 | Status: SHIPPED | OUTPATIENT
Start: 2020-06-23 | End: 2020-06-30

## 2020-06-23 NOTE — PROGRESS NOTES
[facility]  HEMATOLOGY & ONCOLOGY        Subjective     VISIT DIAGNOSIS:   No diagnosis found.    REASON FOR VISIT:     Chief Complaint   Patient presents with   • thrombocytopenia     here for fu, no complaints , says she wants her ear looked at says it may be clogged         HEMATOLOGY / ONCOLOGY HISTORY:    No history exists.     [No treatment plan]  Cancer Staging Information:  Cancer Staging  No matching staging information was found for the patient.      INTERVAL HISTORY  Patient ID: Lucía Wolff is a 59 y.o. year old female referred to me for thrombocytopenia by her gastro. Per pt s/p egd 2 months ago and c-scope a year ago. S/p liver biopsy per pt fatty liver. Will obtain records. She denies n/v, ELVIN, abdominal pain, sob, cp, night sweats, unintentional weight loss, focal weakness. Ros unremarkable. PE non focal.  11/22/19: she presents to review bone marrow bx which was Normocellular marrow with maturing trilineage hematopoiesis. No morphologic evidence of overt or advanced myelodysplasia, acute leukemia, metastatic neoplasm or plasma cell  Dyscrasia. A small monoclonal B-cell population identified by flow cytometry (approximately 2%), see comment. Stainable iron present. No significant fibrosis.  6/23/20: pt presents today for f/u. She has been having right ear ache but no fever  Denies sob/cp/n/v/fever/night sweats/weight loss/ELVIN/early satiety/abdominal pain. Ros unremarkable. PE notable for right ear tympanum erythema.      Review of Systems     See above. Otherwise neg    Medications:    Current Outpatient Medications   Medication Sig Dispense Refill   • acetaminophen (TYLENOL) 325 MG tablet Take 650 mg by mouth Every 6 (Six) Hours As Needed for Mild Pain .     • calcium carbonate (OS-MITRA) 600 MG tablet Take 600 mg by mouth 2 (Two) Times a Day With Meals.     • Multiple Vitamins-Minerals (MULTIVITAMIN WITH MINERALS) tablet tablet Take 1 tablet by mouth Daily.     • Omega-3 300 MG  capsule Take 1,200 mg by mouth Daily.     • omeprazole (priLOSEC) 20 MG capsule Take 20 mg by mouth Daily As Needed.     • Thyroid 65 MG PO tablet Take 65 mg by mouth Daily.     • VIT B12-METHIONINE-INOS-CHOL IM Inject 1 mL into the appropriate muscle as directed by prescriber Every 30 (Thirty) Days.     • Dextromethorphan-guaiFENesin (MUCINEX DM PO) Take 1 tablet by mouth 2 (Two) Times a Day As Needed.     • metFORMIN ER (GLUCOPHAGE-XR) 500 MG 24 hr tablet Take 500 mg by mouth Daily With Breakfast.       No current facility-administered medications for this visit.        ALLERGIES:  No Known Allergies    Objective      Vitals:    06/23/20 0820   BP: 125/62   Pulse: 79   Resp: 18   Temp: 98.9 °F (37.2 °C)   SpO2: 98%       Current Status 6/23/2020   ECOG score 0       General Appearance: Patient is awake, alert, oriented and in no acute distress. Patient is welldeveloped, wellnourished, and appears stated age.  HEENT: Normocephalic. Sclerae clear, conjunctiva pink, extraocular movements intact, pupils, round, reactive to light and  accommodation. Mouth and throat are clear with moist oral mucosa. Right ear tympanum with erythema. Left unremarkable.  NECK: Supple, no jugular venous distention, thyroid not enlarged.  LYMPH: No cervical, supraclavicular, axillary, or inguinal lymphadenopathy.  CHEST: Equal bilateral expansion, AP  diameter normal, resonant percussion note  LUNGS: Good air movement, no rales, rhonchi, rubs or wheezes with auscultation  CARDIO: Regular sinus rhythm, no murmurs, gallops or rubs.  ABDOMEN: Nondistended, soft, No tenderness, no guarding, no rebound, No hepatosplenomegaly. No abdominal masses. Bowel sounds positive. No hernia  GENITALIA: Not examined.  BREASTS: Not examined.  MUSKEL: No joint swelling, decreased motion, or inflammation  EXTREMS: No edema, clubbing, cyanosis, No varicose veins.  NEURO: Grossly nonfocal, Gait is coordinated and smooth, Cognition is preserved.  SKIN: No rashes,  no ecchymoses, no petechia.  PSYCH: Oriented to time, place and person. Memory is preserved. Mood and affect appear normal      RECENT LABS:  No visits with results within 7 Day(s) from this visit.   Latest known visit with results is:   Appointment on 11/22/2019   Component Date Value Ref Range Status   • Extra Tube 11/22/2019 Hold for add-ons.   Final    Auto resulted.   • Extra Tube 11/22/2019 Hold for add-ons.   Final    Auto resulted.       RADIOLOGY:  No results found.         Assessment/Plan 59 yof referred for thrombocytopenia    1.Thrombocytopenia: US abdomen normal liver, and splenomegaly. However pt reported having liver bx and she was told she has fatty liver. Suspect low platelet from low TPO and splenomegaly  -NTD. Monitor for now  -bone marrow bx negative for obvious leuk/lymp but a small population of  monoclonal B-cell population identified by flow cytometry (approximately 2%). This is not clinically meaningful at this point  -will continue monitoring due to heterogeneity of the bone marrow if cbc worsens, will repeat bone marrow biopsy  6/23/20: pt had labs done at OSH which was unremarkable. plt 113  - I reviewed with pt alarming signs which ae petechie, purpura, uncontrollable nose bleed to seek medical attention  -no intervention needed unless plt <10K or <50K with bleed. So far stable  rtc in 6 months or as clinically indicated.    2. DM: on metformin    3. Hypothyroidism: on thyroid medication  4. Low B12: eliud monthly injections  5. Otitis media: rx amoxicillin bid to pharmacy           Terra Warren MD    6/23/2020    08:29

## 2020-12-08 DIAGNOSIS — D69.6 THROMBOCYTOPENIA (HCC): Primary | ICD-10-CM

## 2020-12-10 ENCOUNTER — OFFICE VISIT (OUTPATIENT)
Dept: ONCOLOGY | Facility: CLINIC | Age: 60
End: 2020-12-10

## 2020-12-10 VITALS
HEIGHT: 65 IN | SYSTOLIC BLOOD PRESSURE: 124 MMHG | BODY MASS INDEX: 27.99 KG/M2 | OXYGEN SATURATION: 98 % | WEIGHT: 168 LBS | HEART RATE: 90 BPM | DIASTOLIC BLOOD PRESSURE: 68 MMHG | TEMPERATURE: 99.1 F | RESPIRATION RATE: 16 BRPM

## 2020-12-10 DIAGNOSIS — R16.1 SPLENOMEGALY: ICD-10-CM

## 2020-12-10 DIAGNOSIS — D69.6 THROMBOCYTOPENIA (HCC): Primary | ICD-10-CM

## 2020-12-10 PROCEDURE — 99213 OFFICE O/P EST LOW 20 MIN: CPT | Performed by: NURSE PRACTITIONER

## 2020-12-10 NOTE — PROGRESS NOTES
[facility]  HEMATOLOGY & ONCOLOGY    [facility] ONCOLOGY  [address] [place] [address] [address]  [facility] [address]  [phone]    Patient Name: Lucía Wolff  Encounter Date: 12/10/2020  YOB: 1960  Patient Number: [id number]    Chief Complaint   Patient presents with   • THROMBOCYTOPENIA     HERE FOR F/U       REASON FOR VISIT: Lucía is a 60-year-old female patient here today in follow-up for thrombocytopenia.  She has been followed by Dr. Warren whom recently relocated.  She has had an extensive work-up including a bone marrow biopsy and aspiration back in November 2019 that was essentially normal.  She was found to have splenomegaly on an ultrasound with her spleen measuring 17 cm.  Liver appeared to be normal but she has had a liver biopsy in the past and was noted to have a fatty liver.  And the biopsy in fact did find minimal steatosis of less than 5% of the total volume.    Her platelet count has overall ranged from 100,000 to 125,000.  It has not dropped below 100,000 and the records available today.    Blood counts on 11/25/2020 at an outside lab showed a white blood cell count of 3.4, hemoglobin 11.8, hematocrit 35.4 and a platelet count of 109,000.  Her B12 was normal at 750.  Thyroid studies normal.  Hemoglobin A1c 5.7.  Total bilirubin was 2.4 ALT AST were within normal range.     PAST MEDICAL HISTORY:    ALLERGIES:  No Known Allergies    CURRENT MEDICATIONS:  Outpatient Encounter Medications as of 12/10/2020   Medication Sig Dispense Refill   • acetaminophen (TYLENOL) 325 MG tablet Take 650 mg by mouth Every 6 (Six) Hours As Needed for Mild Pain .     • calcium carbonate (OS-MITRA) 600 MG tablet Take 600 mg by mouth 2 (Two) Times a Day With Meals.     • Cholecalciferol (vitamin D3) 125 MCG (5000 UT) capsule capsule Take 5,000 Units by mouth Daily.     • Dextromethorphan-guaiFENesin (MUCINEX DM PO) Take 1 tablet by mouth 2 (Two) Times  a Day As Needed.     • Multiple Vitamins-Minerals (MULTIVITAMIN WITH MINERALS) tablet tablet Take 1 tablet by mouth Daily.     • Omega-3 300 MG capsule Take 1,200 mg by mouth Daily.     • omeprazole (priLOSEC) 20 MG capsule Take 20 mg by mouth Daily As Needed.     • Thyroid 65 MG PO tablet Take 65 mg by mouth Daily.     • VIT B12-METHIONINE-INOS-CHOL IM Inject 1 mL into the appropriate muscle as directed by prescriber Every 30 (Thirty) Days.     • [DISCONTINUED] metFORMIN ER (GLUCOPHAGE-XR) 500 MG 24 hr tablet Take 500 mg by mouth Daily With Breakfast.       No facility-administered encounter medications on file as of 12/10/2020.        ADULT ILLNESSES:  Patient Active Problem List   Diagnosis Code   • Heart murmur R01.1       SURGERIES:  Past Surgical History:   Procedure Laterality Date   • CHOLECYSTECTOMY     • HYSTERECTOMY  2003    PARTIAL   • LIVER BIOPSY  08/12/2019       HEALTH MAINTENANCE ITEMS:  <no information>  Last Completed Colonoscopy       Status Date      COLONOSCOPY No completions recorded          Last Completed Mammogram       Status Date      MAMMOGRAM Done 11/1/2018    negative Ext Proc: CHG SCREENING DIGITAL BREAST TOMOSYNTHESIS BI          FAMILY HISTORY:  Family History   Problem Relation Age of Onset   • Alzheimer's disease Mother    • Diabetes Father    • Stroke Father    • Cardiomyopathy Father    • Heart attack Father    • Heart disease Father    • Hypertension Father    • Cancer Brother    • Diabetes Brother    • Diabetes Brother    • Hypertension Brother    • No Known Problems Brother        SOCIAL HISTORY:  Social History     Socioeconomic History   • Marital status:      Spouse name: Not on file   • Number of children: Not on file   • Years of education: Not on file   • Highest education level: Not on file   Tobacco Use   • Smoking status: Never Smoker   • Smokeless tobacco: Never Used   • Tobacco comment: has tried it but did not keep smoking   Substance and Sexual Activity  "  • Alcohol use: Yes     Comment: very rare   • Drug use: No   • Sexual activity: Defer       REVIEW OF SYSTEMS:  Review of Systems   Constitutional: Negative for activity change, appetite change, chills, diaphoresis, fatigue, fever and unexpected weight loss.   HENT: Negative for ear pain, nosebleeds, sinus pressure, sore throat and voice change.    Eyes: Negative for blurred vision, double vision, pain and visual disturbance.   Respiratory: Negative for cough and shortness of breath.    Cardiovascular: Negative for chest pain, palpitations and leg swelling.   Gastrointestinal: Negative for abdominal pain, anal bleeding, blood in stool, constipation, diarrhea, nausea and vomiting.   Endocrine: Negative for heat intolerance, polydipsia and polyuria.   Genitourinary: Negative for dysuria, frequency, hematuria, urgency and urinary incontinence.   Musculoskeletal: Negative for arthralgias and myalgias.   Skin: Negative for rash and skin lesions.   Neurological: Negative for dizziness, tremors, seizures, syncope, speech difficulty, weakness and headache.   Hematological: Negative for adenopathy. Does not bruise/bleed easily.   Psychiatric/Behavioral: Negative for dysphoric mood, sleep disturbance, suicidal ideas and depressed mood.       /68   Pulse 90   Temp 99.1 °F (37.3 °C) (Temporal)   Resp 16   Ht 165.1 cm (65\")   Wt 76.2 kg (168 lb)   SpO2 98%   Breastfeeding No   BMI 27.96 kg/m²  Body surface area is 1.84 meters squared.  Pain Score    12/10/20 1412   PainSc: 0-No pain       Physical Exam:  Physical Exam  Vitals signs reviewed.   Constitutional:       Appearance: Normal appearance. She is well-developed.   HENT:      Head: Atraumatic.   Eyes:      General: No scleral icterus.     Pupils: Pupils are equal, round, and reactive to light.   Neck:      Musculoskeletal: Neck supple.      Trachea: Trachea normal.   Cardiovascular:      Rate and Rhythm: Normal rate and regular rhythm.      Heart sounds: No " murmur. No friction rub. No gallop.    Pulmonary:      Effort: Pulmonary effort is normal.      Breath sounds: Normal breath sounds. No wheezing, rhonchi or rales.   Abdominal:      Palpations: Abdomen is soft.      Tenderness: There is no abdominal tenderness. There is no guarding or rebound.   Lymphadenopathy:      Cervical: No cervical adenopathy.   Skin:     General: Skin is warm and dry.   Neurological:      Mental Status: She is alert and oriented to person, place, and time.      Sensory: No sensory deficit.   Psychiatric:         Judgment: Judgment normal.         Lucía Gilbertmiguel reports a pain score of 0.     Patient's Body mass index is 27.96 kg/m². BMI is above normal parameters. Recommendations include: defer to pcp.      LABS    Lab Results - Last 18 Months   Lab Units 11/22/19  1231 10/31/19  1024 10/29/19  1011   HEMOGLOBIN g/dL 12.0 11.9* 12.0   HEMATOCRIT % 34.8 33.7* 34.7   MCV fL 85.3 85.3 85.7   WBC 10*3/mm3 3.00* 3.92 5.40   RDW % 15.0 14.9 15.0   MPV fL 10.8 10.8 10.8   PLATELETS 10*3/mm3 111* 122* 125*   IMM GRAN % %  --  0.5  --    NEUTROS ABS 10*3/mm3 1.85 2.74 3.80   LYMPHS ABS 10*3/mm3 0.89 0.85 1.08   MONOS ABS 10*3/mm3 0.18 0.26 0.42   EOS ABS 10*3/mm3 0.06 0.05 0.07   BASOS ABS 10*3/mm3 0.01 0.00 0.01   IMMATURE GRANS (ABS) 10*3/mm3  --  0.02  --    NRBC /100 WBC  --  0.0  --        Lab Results - Last 18 Months   Lab Units 11/22/19  1231 10/29/19  1011   GLUCOSE mg/dL 127* 151*   SODIUM mmol/L 141 143   POTASSIUM mmol/L 4.3 4.0   CO2 mmol/L 27.0 27.0   CHLORIDE mmol/L 104 102   ANION GAP mmol/L 10.0 14.0   CREATININE mg/dL 0.61 0.61   BUN mg/dL 12 10   BUN / CREAT RATIO  19.7 16.4   CALCIUM mg/dL 9.5 9.2   EGFR IF NONAFRICN AM mL/min/1.73 101 101   ALK PHOS U/L 75 77   TOTAL PROTEIN g/dL 6.8 7.1   ALT (SGPT) U/L 41* 19   AST (SGOT) U/L 22 15   BILIRUBIN mg/dL 1.5* 1.8*   ALBUMIN g/dL 4.90 4.80   GLOBULIN gm/dL 1.9 2.3       Lab Results - Last 18 Months   Lab Units 10/29/19  1011    FERRITIN ng/mL 418.80*     ASSESSMENT  1.  Thrombocytopenia secondary to splenomegaly.  Patient spleen measured 17.5 cm on ultrasound 11/4/2019.  Platelet count remaining above 100,000.  It is fluctuating between 109,000 to 125,000.  This is overall stable.  It is currently on 109,000.  2.  Bone marrow biopsy except for 2% monoclonal b cell population by flow, Dr Warren noted it was not clinically significant.  Will monitor  3.  GERD symptoms stable on prilosec  4.  Hypothyroidism stable and managed by PCP  5.  B12 deficiency on monthly injections     PLAN  1.  Reviewed the available lab results with the patient today showing the stability of her platelet count  2.  Advsied patient that platelet count is stable and we will continue to monitor.  3.  Continue the B12 monthly injections per pcp  4.  Advised patient to continue to follow with pcp and other specialists  5.  Return in the office for follow up with preoffice cbc, cmp    I spent 20 total minutes, face-to-face, caring for Lucía today.  Greater than 50% of this time involved counseling and/or coordination of care as documented within this note regarding the patient's illness(es), pros and cons of various treatment options, instructions and/or risk reduction.    Kay Palma, APRN  12/10/2020

## 2021-06-03 DIAGNOSIS — D69.6 THROMBOCYTOPENIA (HCC): Primary | ICD-10-CM

## 2021-06-03 DIAGNOSIS — R16.1 SPLENOMEGALY: ICD-10-CM

## 2021-06-10 ENCOUNTER — OFFICE VISIT (OUTPATIENT)
Dept: ONCOLOGY | Facility: CLINIC | Age: 61
End: 2021-06-10

## 2021-06-10 ENCOUNTER — LAB (OUTPATIENT)
Dept: ONCOLOGY | Facility: CLINIC | Age: 61
End: 2021-06-10

## 2021-06-10 VITALS
RESPIRATION RATE: 16 BRPM | WEIGHT: 171 LBS | HEART RATE: 68 BPM | HEIGHT: 65 IN | DIASTOLIC BLOOD PRESSURE: 66 MMHG | BODY MASS INDEX: 28.49 KG/M2 | SYSTOLIC BLOOD PRESSURE: 112 MMHG | OXYGEN SATURATION: 98 % | TEMPERATURE: 98 F

## 2021-06-10 DIAGNOSIS — D72.819 LEUKOPENIA, UNSPECIFIED TYPE: ICD-10-CM

## 2021-06-10 DIAGNOSIS — D69.6 THROMBOCYTOPENIA (HCC): ICD-10-CM

## 2021-06-10 DIAGNOSIS — D64.9 ANEMIA, UNSPECIFIED TYPE: Primary | ICD-10-CM

## 2021-06-10 DIAGNOSIS — R16.1 SPLENOMEGALY: ICD-10-CM

## 2021-06-10 LAB
ALBUMIN SERPL-MCNC: 4.5 G/DL (ref 3.5–5.2)
ALBUMIN/GLOB SERPL: 2.6 G/DL
ALP SERPL-CCNC: 74 U/L (ref 39–117)
ALT SERPL W P-5'-P-CCNC: 27 U/L (ref 1–33)
ANION GAP SERPL CALCULATED.3IONS-SCNC: 10 MMOL/L (ref 5–15)
AST SERPL-CCNC: 25 U/L (ref 1–32)
BASOPHILS # BLD AUTO: 0.01 10*3/MM3 (ref 0–0.2)
BASOPHILS NFR BLD AUTO: 0.3 % (ref 0–1.5)
BILIRUB SERPL-MCNC: 1.7 MG/DL (ref 0–1.2)
BUN SERPL-MCNC: 6 MG/DL (ref 6–20)
BUN/CREAT SERPL: 10.5 (ref 7–25)
CALCIUM SPEC-SCNC: 9 MG/DL (ref 8.6–10.5)
CHLORIDE SERPL-SCNC: 108 MMOL/L (ref 98–107)
CO2 SERPL-SCNC: 26 MMOL/L (ref 22–29)
CREAT SERPL-MCNC: 0.57 MG/DL (ref 0.57–1)
DEPRECATED RDW RBC AUTO: 50.9 FL (ref 37–54)
EOSINOPHIL # BLD AUTO: 0.06 10*3/MM3 (ref 0–0.4)
EOSINOPHIL NFR BLD AUTO: 1.9 % (ref 0.3–6.2)
ERYTHROCYTE [DISTWIDTH] IN BLOOD BY AUTOMATED COUNT: 15.6 % (ref 12.3–15.4)
GFR SERPL CREATININE-BSD FRML MDRD: 109 ML/MIN/1.73
GLOBULIN UR ELPH-MCNC: 1.7 GM/DL
GLUCOSE SERPL-MCNC: 187 MG/DL (ref 65–99)
HCT VFR BLD AUTO: 35.4 % (ref 34–46.6)
HGB BLD-MCNC: 11.6 G/DL (ref 12–15.9)
IMM GRANULOCYTES # BLD AUTO: 0.02 10*3/MM3 (ref 0–0.05)
IMM GRANULOCYTES NFR BLD AUTO: 0.6 % (ref 0–0.5)
LYMPHOCYTES # BLD AUTO: 0.91 10*3/MM3 (ref 0.7–3.1)
LYMPHOCYTES NFR BLD AUTO: 28.8 % (ref 19.6–45.3)
MCH RBC QN AUTO: 28.9 PG (ref 26.6–33)
MCHC RBC AUTO-ENTMCNC: 32.8 G/DL (ref 31.5–35.7)
MCV RBC AUTO: 88.3 FL (ref 79–97)
MONOCYTES # BLD AUTO: 0.19 10*3/MM3 (ref 0.1–0.9)
MONOCYTES NFR BLD AUTO: 6 % (ref 5–12)
NEUTROPHILS NFR BLD AUTO: 1.97 10*3/MM3 (ref 1.7–7)
NEUTROPHILS NFR BLD AUTO: 62.4 % (ref 42.7–76)
PLATELET # BLD AUTO: 113 10*3/MM3 (ref 140–450)
PMV BLD AUTO: 10.1 FL (ref 6–12)
POTASSIUM SERPL-SCNC: 3.8 MMOL/L (ref 3.5–5.2)
PROT SERPL-MCNC: 6.2 G/DL (ref 6–8.5)
RBC # BLD AUTO: 4.01 10*6/MM3 (ref 3.77–5.28)
SODIUM SERPL-SCNC: 144 MMOL/L (ref 136–145)
WBC # BLD AUTO: 3.16 10*3/MM3 (ref 3.4–10.8)

## 2021-06-10 PROCEDURE — 85025 COMPLETE CBC W/AUTO DIFF WBC: CPT | Performed by: NURSE PRACTITIONER

## 2021-06-10 PROCEDURE — 82746 ASSAY OF FOLIC ACID SERUM: CPT | Performed by: NURSE PRACTITIONER

## 2021-06-10 PROCEDURE — 99213 OFFICE O/P EST LOW 20 MIN: CPT | Performed by: NURSE PRACTITIONER

## 2021-06-10 PROCEDURE — 80053 COMPREHEN METABOLIC PANEL: CPT | Performed by: NURSE PRACTITIONER

## 2021-06-10 PROCEDURE — 82607 VITAMIN B-12: CPT | Performed by: NURSE PRACTITIONER

## 2021-06-10 NOTE — PROGRESS NOTES
[facility]  HEMATOLOGY & ONCOLOGY    MGW ONC [facility] ONCOLOGY  [address] [place] [address] [address]  [facility] [address]  [phone]    Patient Name: Lucía Wolff  Encounter Date: 6/10/2021  YOB: 1960  Patient Number: [id number]    Chief Complaint   Patient presents with   • THRMBOCYTOPENIA     F/U       REASON FOR VISIT: Lucía is a 60-year-old female patient here today in follow-up for thrombocytopenia and intermittent pancytopenia.  She has been followed by Dr. Warren whom recently relocated.  She has had an extensive work-up including a bone marrow biopsy and aspiration back in November 2019 that was essentially normal.  She was found to have splenomegaly on an ultrasound with her spleen measuring 17 cm.  Liver appeared to be normal but she has had a liver biopsy in the past and was noted to have a fatty liver.  The biopsy in fact did find minimal steatosis of less than 5% of the total volume.    Her platelet count has overall ranged from 100,000 to 125,000.  It has not dropped below 100,000 in the records available. Her WBC and Hgb fluctuate. The WBC has ranged from 3.0 to 5.40 over last 2 years and hgb 11.6 to 12.0.    CBC today shows a white count of 3.16, hemoglobin 11.6, hematocrit 35.4 and a platelet count of 113,000.  CMP is currently pending.    She presents today feeling well without any complaints.  She states she has been tired and stressed that she has been moving.  She denies any abdominal pain nausea vomiting.  No bowel habit changes.  No fever chills night sweats.  Appetite is good weight is stable.    PAST MEDICAL HISTORY:    ALLERGIES:  No Known Allergies    CURRENT MEDICATIONS:  Outpatient Encounter Medications as of 6/10/2021   Medication Sig Dispense Refill   • acetaminophen (TYLENOL) 325 MG tablet Take 650 mg by mouth Every 6 (Six) Hours As Needed for Mild Pain .     • calcium carbonate (OS-MITRA) 600 MG tablet Take 600 mg by  mouth 2 (Two) Times a Day With Meals.     • Cholecalciferol (vitamin D3) 125 MCG (5000 UT) capsule capsule Take 5,000 Units by mouth Daily.     • Dextromethorphan-guaiFENesin (MUCINEX DM PO) Take 1 tablet by mouth 2 (Two) Times a Day As Needed.     • Multiple Vitamins-Minerals (MULTIVITAMIN WITH MINERALS) tablet tablet Take 1 tablet by mouth Daily.     • Omega-3 300 MG capsule Take 1,200 mg by mouth Daily.     • omeprazole (priLOSEC) 20 MG capsule Take 20 mg by mouth Daily As Needed.     • Thyroid 65 MG PO tablet Take 65 mg by mouth Daily.     • VIT B12-METHIONINE-INOS-CHOL IM Inject 1 mL into the appropriate muscle as directed by prescriber Every 30 (Thirty) Days.       No facility-administered encounter medications on file as of 6/10/2021.       ADULT ILLNESSES:  Patient Active Problem List   Diagnosis Code   • Heart murmur R01.1   • Thrombocytopenia (CMS/HCC) D69.6       SURGERIES:  Past Surgical History:   Procedure Laterality Date   • CHOLECYSTECTOMY     • HYSTERECTOMY  2003    PARTIAL   • LIVER BIOPSY  08/12/2019       HEALTH MAINTENANCE ITEMS:    Last Completed Colonoscopy     @ [facility] and normal; due in 2022          Last Completed Mammogram       Status Date      MAMMOGRAM Done @ [facility] 11/2020    negative Ext Proc: [facility] SCREENING DIGITAL BREAST TOMOSYNTHESIS BI          FAMILY HISTORY:  Family History   Problem Relation Age of Onset   • Alzheimer's disease Mother    • Diabetes Father    • Stroke Father    • Cardiomyopathy Father    • Heart attack Father    • Heart disease Father    • Hypertension Father    • Cancer Brother    • Diabetes Brother    • Diabetes Brother    • Hypertension Brother    • No Known Problems Brother        SOCIAL HISTORY:  Social History     Socioeconomic History   • Marital status:      Spouse name: Not on file   • Number of children: Not on file   • Years of education: Not on file   • Highest education level: Not on file   Tobacco Use   • Smoking status: Never Smoker   •  "Smokeless tobacco: Never Used   • Tobacco comment: has tried it but did not keep smoking   Substance and Sexual Activity   • Alcohol use: Yes     Comment: very rare   • Drug use: No   • Sexual activity: Defer       REVIEW OF SYSTEMS:  Review of Systems   Constitutional: Positive for fatigue. Negative for activity change, appetite change, chills, diaphoresis, fever and unexpected weight loss.   HENT: Negative for nosebleeds, sinus pressure, sore throat and voice change.    Eyes: Negative for blurred vision, double vision and visual disturbance.   Respiratory: Negative for cough and shortness of breath.    Cardiovascular: Negative for chest pain, palpitations and leg swelling.   Gastrointestinal: Negative for abdominal pain, anal bleeding, blood in stool, constipation, diarrhea, nausea and vomiting.   Endocrine: Negative for heat intolerance, polydipsia and polyuria.   Genitourinary: Negative for dysuria, frequency, hematuria, urgency and urinary incontinence.   Musculoskeletal: Negative for arthralgias and myalgias.   Skin: Negative for rash and skin lesions.   Neurological: Negative for dizziness, weakness and headache.   Hematological: Negative for adenopathy. Does not bruise/bleed easily.   Psychiatric/Behavioral: Negative for suicidal ideas and depressed mood.       /66   Pulse 68   Temp 98 °F (36.7 °C) (Temporal)   Resp 16   Ht 165.1 cm (65\")   Wt 77.6 kg (171 lb)   SpO2 98%   Breastfeeding No   BMI 28.46 kg/m²  Body surface area is 1.85 meters squared.  Pain Score    06/10/21 0819   PainSc: 0-No pain       Physical Exam:  Physical Exam  Vitals reviewed.   Constitutional:       Appearance: Normal appearance. She is well-developed.   HENT:      Head: Atraumatic.   Eyes:      General: No scleral icterus.     Pupils: Pupils are equal, round, and reactive to light.   Neck:      Trachea: Trachea normal.   Cardiovascular:      Rate and Rhythm: Normal rate and regular rhythm.      Heart sounds: No murmur " heard.     Pulmonary:      Effort: Pulmonary effort is normal.      Breath sounds: Normal breath sounds. No wheezing, rhonchi or rales.   Abdominal:      Palpations: Abdomen is soft.      Tenderness: There is no abdominal tenderness. There is no guarding or rebound.   Musculoskeletal:      Cervical back: Neck supple.   Lymphadenopathy:      Cervical: No cervical adenopathy.   Skin:     General: Skin is warm and dry.   Neurological:      General: No focal deficit present.      Mental Status: She is alert and oriented to person, place, and time.   Psychiatric:         Mood and Affect: Mood normal.         Behavior: Behavior normal.         Lucía Wolff reports a pain score of 0.     Patient's Body mass index is 28.46 kg/m². BMI is above normal parameters. Recommendations include: defer to pcp.      LABS    Lab Results - Last 18 Months   Lab Units 06/10/21  0750   HEMOGLOBIN g/dL 11.6*   HEMATOCRIT % 35.4   MCV fL 88.3   WBC 10*3/mm3 3.16*   RDW % 15.6*   MPV fL 10.1   PLATELETS 10*3/mm3 113*   IMM GRAN % % 0.6*   NEUTROS ABS 10*3/mm3 1.97   LYMPHS ABS 10*3/mm3 0.91   MONOS ABS 10*3/mm3 0.19   EOS ABS 10*3/mm3 0.06   BASOS ABS 10*3/mm3 0.01   IMMATURE GRANS (ABS) 10*3/mm3 0.02       ASSESSMENT  1.  Thrombocytopenia secondary to splenomegaly.  Patient spleen measured 17.5 cm on ultrasound 11/4/2019.  Platelet count remaining above 100,000.  It is fluctuating between 109,000 to 125,000.  This is overall stable.  It is currently at 113,000 today and stable.  2. Leukopenia without neutropenia. WBC stable at 3.16, ANC 1.97 secondary to Hypersplenism  3. Normocytic Normochromic anemia with hgb at 11.6 today. Also secondary to hypersplenism   4.  Bone marrow biopsy except for 2% monoclonal b cell population by flow, Dr Warren noted it was not clinically significant.  Will monitor  5.  GERD symptoms stable on prilosec  6.  Hypothyroidism stable and managed by PCP  7.  B12 deficiency on monthly injections     PLAN  1.   Reviewed the available lab results with the patient today showing the stability   2.  Check B12, folate and iron studies today  3.  Continue the B12 monthly injections per pcp  4.  Advised patient to continue to follow with pcp and other specialists  5.  Return in the office for follow up in 6 months with preoffice cbc, cmp      Kay Palma, ANAIS  06/10/2021  12:37 CDT

## 2021-06-11 ENCOUNTER — TELEPHONE (OUTPATIENT)
Dept: ONCOLOGY | Facility: CLINIC | Age: 61
End: 2021-06-11

## 2021-06-11 LAB
FOLATE SERPL-MCNC: 3.98 NG/ML (ref 4.78–24.2)
VIT B12 BLD-MCNC: 549 PG/ML (ref 211–946)

## 2021-06-11 NOTE — TELEPHONE ENCOUNTER
Notified of low folic acid..she will purchase OTC    ----- Message from ANAIS Chapman sent at 6/11/2021  9:12 AM CDT -----  Please let either us know that her folic acid value is quite low.  She is going to need to start taking a folic acid 1 mg daily.  We can prescribe this for her if she can get it over-the-counter.

## 2021-06-21 ENCOUNTER — TELEPHONE (OUTPATIENT)
Dept: ONCOLOGY | Facility: CLINIC | Age: 61
End: 2021-06-21

## 2021-06-21 NOTE — TELEPHONE ENCOUNTER
Caller: PT    Relationship: PT    Best call back number:[phone]    What is the best time to reach you:     Who are you requesting to speak with (clinical staff, provider,  specific staff member): CLINICAL    Do you know the name of the person who called:     What was the call regarding: VERIFY WHAT TESTS WERE RAN ON 6/10 . IF ALPHA PROTEIN, PC, CMP, CBC DIFF PLEASE FORWARD TO OFFICE OF MD. LEANDRO SOLOMON @ [facility].    NOTIFY PT WHAT TESTS WERE NOT COMPLETED SO THAT SHE KNOPWS WHAT WILL STILL NEED TO BE DONE    Do you require a callback: YES

## 2021-07-06 ENCOUNTER — TELEPHONE (OUTPATIENT)
Dept: ONCOLOGY | Facility: CLINIC | Age: 61
End: 2021-07-06

## 2021-07-06 NOTE — TELEPHONE ENCOUNTER
PT HAS 800MCG FOLIC ACID AT HOME, PER UTE TOLD PT TO TAKE 2 A DAY. I FAXED LABS TO DR. REICH OFFICE PER PT REQUEST

## 2021-07-06 NOTE — TELEPHONE ENCOUNTER
Caller: PT    Relationship: SELF    Best call back number: [phone] OK TO LEAVE  MSG     Caller requesting test results: YES    What test was performed: LABS    When was the test performed: 7/10    Where was the test performed: [place] [facility]    Additional notes: PT HAD LABS AND THEN DR ARROYO ADDED MORE LABS SO SHE WOULD LIKE LAB RESULTS AND ALSO SHE WAS ADVISED TO TAKE FOLIC ACID BUT SHE CANNOT REMEMBER HOW MUCH TO TAKE OR WHAT KIND AND SHE NEEDS LABS RESULTS BECAUSE SHE IS GOING TO BE SEEING ANOTHER DR ALSO AND DOES NOT WANT TO HAVE LABS AGAIN. SHE NEEDS TO KNOW IF INCLUDED KELLY PROTEIN, PT AND CMP AND CBC WITH AUTO DIFF SCORE. AND SHE WOULD LIKE TO COME BY OFFICE TO  A COPY OF ALL HER LABS. NEEDS ASAP BEFORE END OF WEEK AT LATEST.

## 2021-11-11 ENCOUNTER — TELEPHONE (OUTPATIENT)
Dept: ONCOLOGY | Facility: CLINIC | Age: 61
End: 2021-11-11

## 2021-11-11 NOTE — TELEPHONE ENCOUNTER
Caller: LIO WILSON    Relationship: SELF    Best call back number: [phone]    What is the best time to reach you: ANY    Who are you requesting to speak with (clinical staff, provider,  specific staff member): JOE    Do you know the name of the person who called: JOE    What was the call regarding: PT MISSED A CALL FROM JOE AND IS CALLING BACK    Do you require a callback: YES

## 2022-01-10 ENCOUNTER — TELEPHONE (OUTPATIENT)
Dept: ONCOLOGY | Facility: CLINIC | Age: 62
End: 2022-01-10

## 2022-01-10 NOTE — TELEPHONE ENCOUNTER
Caller: Lucía Wolff    Relationship: Self    Best call back number: [phone]      Who are you requesting to speak with (clinical staff, provider,  specific staff member): CLINICAL      What was the call regarding: PATIENT HAD LABS DONE LAST WEEK AT [facility], SHE WANTED TO KNOW IF DR GOLDBERG COULD USE THOSE LABS INSTEAD OF HER HAVING THEM DONE AGAIN      Do you require a callback: YES

## 2022-01-17 ENCOUNTER — LAB (OUTPATIENT)
Dept: LAB | Facility: HOSPITAL | Age: 62
End: 2022-01-17

## 2022-01-17 ENCOUNTER — OFFICE VISIT (OUTPATIENT)
Dept: ONCOLOGY | Facility: CLINIC | Age: 62
End: 2022-01-17

## 2022-01-17 VITALS
BODY MASS INDEX: 27.14 KG/M2 | SYSTOLIC BLOOD PRESSURE: 118 MMHG | TEMPERATURE: 99 F | DIASTOLIC BLOOD PRESSURE: 68 MMHG | HEIGHT: 65 IN | OXYGEN SATURATION: 98 % | RESPIRATION RATE: 16 BRPM | HEART RATE: 88 BPM | WEIGHT: 162.9 LBS

## 2022-01-17 DIAGNOSIS — D64.9 ANEMIA, UNSPECIFIED TYPE: ICD-10-CM

## 2022-01-17 DIAGNOSIS — Z76.89 ENCOUNTER FOR BIOPSY: Primary | ICD-10-CM

## 2022-01-17 DIAGNOSIS — R16.1 SPLENOMEGALY: ICD-10-CM

## 2022-01-17 DIAGNOSIS — D72.819 LEUKOPENIA, UNSPECIFIED TYPE: ICD-10-CM

## 2022-01-17 DIAGNOSIS — D69.6 THROMBOCYTOPENIA: Primary | ICD-10-CM

## 2022-01-17 DIAGNOSIS — D69.6 THROMBOCYTOPENIA: ICD-10-CM

## 2022-01-17 DIAGNOSIS — R79.89 ELEVATED FERRITIN: ICD-10-CM

## 2022-01-17 LAB
ALBUMIN SERPL-MCNC: 4.9 G/DL (ref 3.5–5.2)
ALBUMIN/GLOB SERPL: 2.7 G/DL
ALP SERPL-CCNC: 94 U/L (ref 39–117)
ALT SERPL W P-5'-P-CCNC: 27 U/L (ref 1–33)
ANION GAP SERPL CALCULATED.3IONS-SCNC: 14 MMOL/L (ref 5–15)
AST SERPL-CCNC: 17 U/L (ref 1–32)
BASOPHILS # BLD AUTO: 0.01 10*3/MM3 (ref 0–0.2)
BASOPHILS NFR BLD AUTO: 0.3 % (ref 0–1.5)
BILIRUB CONJ SERPL-MCNC: 0.3 MG/DL (ref 0–0.3)
BILIRUB SERPL-MCNC: 1.9 MG/DL (ref 0–1.2)
BUN SERPL-MCNC: 12 MG/DL (ref 6–20)
BUN/CREAT SERPL: 22.6 (ref 7–25)
CALCIUM SPEC-SCNC: 9.2 MG/DL (ref 8.6–10.5)
CHLORIDE SERPL-SCNC: 103 MMOL/L (ref 98–107)
CO2 SERPL-SCNC: 23 MMOL/L (ref 22–29)
CREAT SERPL-MCNC: 0.53 MG/DL (ref 0.57–1)
CRP SERPL-MCNC: <0.3 MG/DL (ref 0–0.5)
DAT POLY-SP REAG RBC QL: NEGATIVE
DEPRECATED RDW RBC AUTO: 50.8 FL (ref 37–54)
EOSINOPHIL # BLD AUTO: 0.03 10*3/MM3 (ref 0–0.4)
EOSINOPHIL NFR BLD AUTO: 0.8 % (ref 0.3–6.2)
ERYTHROCYTE [DISTWIDTH] IN BLOOD BY AUTOMATED COUNT: 16.2 % (ref 12.3–15.4)
ERYTHROCYTE [SEDIMENTATION RATE] IN BLOOD: <1 MM/HR (ref 0–30)
FERRITIN SERPL-MCNC: 380.2 NG/ML (ref 13–150)
FOLATE SERPL-MCNC: 10.5 NG/ML (ref 4.78–24.2)
GFR SERPL CREATININE-BSD FRML MDRD: 118 ML/MIN/1.73
GLOBULIN UR ELPH-MCNC: 1.8 GM/DL
GLUCOSE SERPL-MCNC: 208 MG/DL (ref 65–99)
HAPTOGLOB SERPL-MCNC: <10 MG/DL (ref 30–200)
HAV IGM SERPL QL IA: NORMAL
HBV CORE IGM SERPL QL IA: NORMAL
HBV SURFACE AG SERPL QL IA: NORMAL
HCT VFR BLD AUTO: 36.5 % (ref 34–46.6)
HCV AB SER DONR QL: NORMAL
HGB BLD-MCNC: 12.1 G/DL (ref 12–15.9)
HIV1+2 AB SER QL: NORMAL
IRON 24H UR-MRATE: 89 MCG/DL (ref 37–145)
IRON SATN MFR SERPL: 26 % (ref 20–50)
LDH SERPL-CCNC: 214 U/L (ref 135–214)
LYMPHOCYTES # BLD AUTO: 0.89 10*3/MM3 (ref 0.7–3.1)
LYMPHOCYTES NFR BLD AUTO: 25.1 % (ref 19.6–45.3)
MCH RBC QN AUTO: 28.5 PG (ref 26.6–33)
MCHC RBC AUTO-ENTMCNC: 33.2 G/DL (ref 31.5–35.7)
MCV RBC AUTO: 86.1 FL (ref 79–97)
MONOCYTES # BLD AUTO: 0.19 10*3/MM3 (ref 0.1–0.9)
MONOCYTES NFR BLD AUTO: 5.4 % (ref 5–12)
NEUTROPHILS NFR BLD AUTO: 2.41 10*3/MM3 (ref 1.7–7)
NEUTROPHILS NFR BLD AUTO: 68.1 % (ref 42.7–76)
PLATELET # BLD AUTO: 97 10*3/MM3 (ref 140–450)
PMV BLD AUTO: 10.6 FL (ref 6–12)
POTASSIUM SERPL-SCNC: 4.3 MMOL/L (ref 3.5–5.2)
PROT SERPL-MCNC: 6.7 G/DL (ref 6–8.5)
RBC # BLD AUTO: 4.24 10*6/MM3 (ref 3.77–5.28)
SODIUM SERPL-SCNC: 140 MMOL/L (ref 136–145)
T4 FREE SERPL-MCNC: 1.05 NG/DL (ref 0.93–1.7)
TIBC SERPL-MCNC: 344 MCG/DL (ref 298–536)
TRANSFERRIN SERPL-MCNC: 231 MG/DL (ref 200–360)
TSH SERPL DL<=0.05 MIU/L-ACNC: 1.23 UIU/ML (ref 0.27–4.2)
VIT B12 BLD-MCNC: 548 PG/ML (ref 211–946)
WBC NRBC COR # BLD: 3.54 10*3/MM3 (ref 3.4–10.8)

## 2022-01-17 PROCEDURE — 83540 ASSAY OF IRON: CPT

## 2022-01-17 PROCEDURE — 86665 EPSTEIN-BARR CAPSID VCA: CPT

## 2022-01-17 PROCEDURE — 84630 ASSAY OF ZINC: CPT

## 2022-01-17 PROCEDURE — G0432 EIA HIV-1/HIV-2 SCREEN: HCPCS

## 2022-01-17 PROCEDURE — 80050 GENERAL HEALTH PANEL: CPT

## 2022-01-17 PROCEDURE — 82728 ASSAY OF FERRITIN: CPT

## 2022-01-17 PROCEDURE — 83615 LACTATE (LD) (LDH) ENZYME: CPT

## 2022-01-17 PROCEDURE — 84439 ASSAY OF FREE THYROXINE: CPT

## 2022-01-17 PROCEDURE — 83010 ASSAY OF HAPTOGLOBIN QUANT: CPT

## 2022-01-17 PROCEDURE — 82525 ASSAY OF COPPER: CPT

## 2022-01-17 PROCEDURE — 86140 C-REACTIVE PROTEIN: CPT

## 2022-01-17 PROCEDURE — 82248 BILIRUBIN DIRECT: CPT

## 2022-01-17 PROCEDURE — 36415 COLL VENOUS BLD VENIPUNCTURE: CPT

## 2022-01-17 PROCEDURE — 86880 COOMBS TEST DIRECT: CPT

## 2022-01-17 PROCEDURE — 84466 ASSAY OF TRANSFERRIN: CPT

## 2022-01-17 PROCEDURE — 99215 OFFICE O/P EST HI 40 MIN: CPT | Performed by: INTERNAL MEDICINE

## 2022-01-17 PROCEDURE — 85245 CLOT FACTOR VIII VW RISTOCTN: CPT

## 2022-01-17 PROCEDURE — 82746 ASSAY OF FOLIC ACID SERUM: CPT

## 2022-01-17 PROCEDURE — 82607 VITAMIN B-12: CPT

## 2022-01-17 PROCEDURE — 80074 ACUTE HEPATITIS PANEL: CPT

## 2022-01-17 PROCEDURE — 85652 RBC SED RATE AUTOMATED: CPT

## 2022-01-17 RX ORDER — FOLIC ACID 1 MG/1
1 TABLET ORAL DAILY
COMMUNITY

## 2022-01-18 ENCOUNTER — TELEPHONE (OUTPATIENT)
Dept: ONCOLOGY | Facility: CLINIC | Age: 62
End: 2022-01-18

## 2022-01-18 LAB
EBV VCA IGG SER IA-ACNC: 230 U/ML (ref 0–17.9)
EBV VCA IGM SER IA-ACNC: <36 U/ML (ref 0–35.9)

## 2022-01-18 NOTE — TELEPHONE ENCOUNTER
Caller: Lucía Wolff    Relationship: Self    Best call back number: [phone]    What is the best time to reach you: ANYTIME    Who are you requesting to speak with (clinical staff, provider,  specific staff member): DR GOLDBERG OR HER NURSE    Do you know the name of the person who called: LUCÍA     What was the call regarding:   [facility] SCHEDULING CALLED TO SCHEDULE  CT PAD BONE MARROW ASPIRTATION AND BIOPSY IT IS SCHEDULED FOR MON 1/24, THEY HAD ASKED ABOUT GETTING COVID TEST.AND TOLD THEM THAT HAD TESTED DEC 31 WAS POSITIVE, THEN TESTED AGAIN 1/5 AND TESTED POSITIVE, IT IS POSSIBLE MAY TEST POSITIVE AGAIN, IS CURRENTLY NOT EXPERIENCING SYMPTOMS AND WAS NOT WHEN TESTED     WAS ADVISED TO CONSULT WITH DR GOLDBERG TO SEE IF STILL NEEDED A TEST OR WHAT SHOULD DO FROM HERE    Do you require a callback: YES

## 2022-01-19 ENCOUNTER — HOSPITAL ENCOUNTER (OUTPATIENT)
Dept: CT IMAGING | Facility: HOSPITAL | Age: 62
Discharge: HOME | End: 2022-01-19
Admitting: INTERNAL MEDICINE

## 2022-01-19 DIAGNOSIS — D72.819 LEUKOPENIA, UNSPECIFIED TYPE: ICD-10-CM

## 2022-01-19 DIAGNOSIS — R16.1 SPLENOMEGALY: ICD-10-CM

## 2022-01-19 DIAGNOSIS — D69.6 THROMBOCYTOPENIA: ICD-10-CM

## 2022-01-19 DIAGNOSIS — D64.9 ANEMIA, UNSPECIFIED TYPE: ICD-10-CM

## 2022-01-19 LAB
CREAT BLDA-MCNC: 0.6 MG/DL (ref 0.6–1.3)
VWF:RCO ACT/NOR PPP PL AGG: 68 % (ref 50–200)

## 2022-01-19 PROCEDURE — 82565 ASSAY OF CREATININE: CPT

## 2022-01-19 PROCEDURE — 74177 CT ABD & PELVIS W/CONTRAST: CPT

## 2022-01-19 PROCEDURE — 25010000002 IOPAMIDOL 61 % SOLUTION: Performed by: INTERNAL MEDICINE

## 2022-01-19 PROCEDURE — 71260 CT THORAX DX C+: CPT

## 2022-01-19 PROCEDURE — 70491 CT SOFT TISSUE NECK W/DYE: CPT

## 2022-01-19 RX ADMIN — IOPAMIDOL 100 ML: 612 INJECTION, SOLUTION INTRAVENOUS at 09:55

## 2022-01-20 ENCOUNTER — TELEPHONE (OUTPATIENT)
Dept: ONCOLOGY | Facility: CLINIC | Age: 62
End: 2022-01-20

## 2022-01-21 ENCOUNTER — APPOINTMENT (OUTPATIENT)
Dept: LAB | Facility: HOSPITAL | Age: 62
End: 2022-01-21

## 2022-01-21 LAB
COPPER SERPL-MCNC: 110 UG/DL (ref 80–158)
ZINC SERPL-MCNC: 71 UG/DL (ref 44–115)

## 2022-01-24 ENCOUNTER — APPOINTMENT (OUTPATIENT)
Dept: CT IMAGING | Facility: HOSPITAL | Age: 62
End: 2022-01-24

## 2022-01-24 ENCOUNTER — TELEPHONE (OUTPATIENT)
Dept: ONCOLOGY | Facility: CLINIC | Age: 62
End: 2022-01-24

## 2022-01-24 RX ORDER — SYRINGE W-NEEDLE,DISPOSAB,3 ML 25GX5/8"
SYRINGE, EMPTY DISPOSABLE MISCELLANEOUS
Qty: 12 EACH | Refills: 0 | Status: CANCELLED | OUTPATIENT
Start: 2022-01-24

## 2022-01-24 RX ORDER — CYANOCOBALAMIN 1000 UG/ML
1000 INJECTION, SOLUTION INTRAMUSCULAR; SUBCUTANEOUS
Qty: 12 ML | Refills: 0 | Status: CANCELLED | OUTPATIENT
Start: 2022-01-24

## 2022-01-24 NOTE — TELEPHONE ENCOUNTER
Called Pt Lucía Wolff to tell her that her PET Scan was approved by insurance. Lucía verbalized understanding. 01/24/2022 bs

## 2022-01-24 NOTE — TELEPHONE ENCOUNTER
Caller: Lucía Wolff    Relationship: Self    Best call back number: [phone]    What is the best time to reach you: BEFORE END OF DAY, PLEASE    Who are you requesting to speak with (clinical staff, provider,  specific staff member): N/A    Do you know the name of the person who called: LUCÍA    What was the call regarding: PATIENT CALLING TO VERIFY THAT PET SCAN WAS APPROVED BY HER INS. FOR TOMORROW.    Do you require a callback: YES, PLEASE

## 2022-01-25 ENCOUNTER — HOSPITAL ENCOUNTER (OUTPATIENT)
Dept: CT IMAGING | Facility: HOSPITAL | Age: 62
Discharge: HOME | End: 2022-01-25

## 2022-01-25 DIAGNOSIS — R16.1 SPLENOMEGALY: ICD-10-CM

## 2022-01-25 PROCEDURE — A9552 F18 FDG: HCPCS | Performed by: INTERNAL MEDICINE

## 2022-01-25 PROCEDURE — 78815 PET IMAGE W/CT SKULL-THIGH: CPT

## 2022-01-25 PROCEDURE — 0 FLUDEOXYGLUCOSE F18 SOLUTION: Performed by: INTERNAL MEDICINE

## 2022-01-25 RX ORDER — SYRINGE W-NEEDLE,DISPOSAB,3 ML 25GX5/8"
SYRINGE, EMPTY DISPOSABLE MISCELLANEOUS
Qty: 12 EACH | Refills: 0 | Status: SHIPPED | OUTPATIENT
Start: 2022-01-25

## 2022-01-25 RX ORDER — CYANOCOBALAMIN 1000 UG/ML
INJECTION, SOLUTION INTRAMUSCULAR; SUBCUTANEOUS
Qty: 12 ML | Refills: 0 | Status: SHIPPED | OUTPATIENT
Start: 2022-01-25

## 2022-01-25 RX ADMIN — FLUDEOXYGLUCOSE F18 1 DOSE: 300 INJECTION INTRAVENOUS at 09:55

## 2022-01-27 ENCOUNTER — TELEPHONE (OUTPATIENT)
Dept: ONCOLOGY | Facility: CLINIC | Age: 62
End: 2022-01-27

## 2022-01-27 NOTE — TELEPHONE ENCOUNTER
Caller: LIO    Relationship to patient: SELF    Best call back number: [phone]    Patient is needing: TO KNOW HOW MUCH B-12 MEDICINE SHE NEEDS TO TAKE.

## 2022-02-21 ENCOUNTER — LAB (OUTPATIENT)
Dept: LAB | Facility: HOSPITAL | Age: 62
End: 2022-02-21

## 2022-02-21 DIAGNOSIS — Z76.89 ENCOUNTER FOR BIOPSY: ICD-10-CM

## 2022-02-21 LAB — SARS-COV-2 ORF1AB RESP QL NAA+PROBE: NOT DETECTED

## 2022-02-21 PROCEDURE — U0004 COV-19 TEST NON-CDC HGH THRU: HCPCS

## 2022-02-21 PROCEDURE — C9803 HOPD COVID-19 SPEC COLLECT: HCPCS

## 2022-02-22 ENCOUNTER — APPOINTMENT (OUTPATIENT)
Dept: LAB | Facility: HOSPITAL | Age: 62
End: 2022-02-22

## 2022-02-24 ENCOUNTER — TELEPHONE (OUTPATIENT)
Dept: ONCOLOGY | Facility: CLINIC | Age: 62
End: 2022-02-24

## 2022-02-24 ENCOUNTER — APPOINTMENT (OUTPATIENT)
Dept: CT IMAGING | Facility: HOSPITAL | Age: 62
End: 2022-02-24

## 2022-02-24 DIAGNOSIS — D69.6 THROMBOCYTOPENIA: Primary | ICD-10-CM

## 2022-02-24 NOTE — TELEPHONE ENCOUNTER
Caller: Lucía Wolff    Relationship: Self    Best call back number: [phone]    What is the best time to reach you: ANYTIME     Who are you requesting to speak with (clinical staff, provider,  specific staff member): NURSE     What was the call regarding: PATIENT CALLED REQUESTING ANOTHER COVID TEST  AS HER PROCEDURE HAS BEEN RESCHEDULED UNTIL Tuesday.    Do you require a callback: PLEASE CALL PATIENT TO ADVISE OF NEW COVID TEST APPT DATE/TIME

## 2022-02-25 ENCOUNTER — OFFICE VISIT (OUTPATIENT)
Dept: ONCOLOGY | Facility: CLINIC | Age: 62
End: 2022-02-25

## 2022-02-25 ENCOUNTER — TELEPHONE (OUTPATIENT)
Dept: ONCOLOGY | Facility: CLINIC | Age: 62
End: 2022-02-25

## 2022-02-25 ENCOUNTER — LAB (OUTPATIENT)
Dept: LAB | Facility: HOSPITAL | Age: 62
End: 2022-02-25

## 2022-02-25 VITALS
OXYGEN SATURATION: 99 % | RESPIRATION RATE: 16 BRPM | SYSTOLIC BLOOD PRESSURE: 142 MMHG | TEMPERATURE: 98 F | HEIGHT: 65 IN | DIASTOLIC BLOOD PRESSURE: 82 MMHG | WEIGHT: 162 LBS | HEART RATE: 90 BPM | BODY MASS INDEX: 26.99 KG/M2

## 2022-02-25 DIAGNOSIS — D69.6 THROMBOCYTOPENIA: Primary | ICD-10-CM

## 2022-02-25 LAB
BASOPHILS # BLD AUTO: 0.01 10*3/MM3 (ref 0–0.2)
BASOPHILS NFR BLD AUTO: 0.3 % (ref 0–1.5)
DEPRECATED RDW RBC AUTO: 50.6 FL (ref 37–54)
EOSINOPHIL # BLD AUTO: 0.03 10*3/MM3 (ref 0–0.4)
EOSINOPHIL NFR BLD AUTO: 0.9 % (ref 0.3–6.2)
ERYTHROCYTE [DISTWIDTH] IN BLOOD BY AUTOMATED COUNT: 15.8 % (ref 12.3–15.4)
HCT VFR BLD AUTO: 35 % (ref 34–46.6)
HGB BLD-MCNC: 11.8 G/DL (ref 12–15.9)
HOLD SPECIMEN: NORMAL
LYMPHOCYTES # BLD AUTO: 0.58 10*3/MM3 (ref 0.7–3.1)
LYMPHOCYTES NFR BLD AUTO: 17.8 % (ref 19.6–45.3)
MCH RBC QN AUTO: 29.8 PG (ref 26.6–33)
MCHC RBC AUTO-ENTMCNC: 33.7 G/DL (ref 31.5–35.7)
MCV RBC AUTO: 88.4 FL (ref 79–97)
MONOCYTES # BLD AUTO: 0.24 10*3/MM3 (ref 0.1–0.9)
MONOCYTES NFR BLD AUTO: 7.4 % (ref 5–12)
NEUTROPHILS NFR BLD AUTO: 2.37 10*3/MM3 (ref 1.7–7)
NEUTROPHILS NFR BLD AUTO: 73 % (ref 42.7–76)
PLATELET # BLD AUTO: 87 10*3/MM3 (ref 140–450)
PMV BLD AUTO: 10.7 FL (ref 6–12)
RBC # BLD AUTO: 3.96 10*6/MM3 (ref 3.77–5.28)
WBC NRBC COR # BLD: 3.25 10*3/MM3 (ref 3.4–10.8)

## 2022-02-25 PROCEDURE — 36415 COLL VENOUS BLD VENIPUNCTURE: CPT

## 2022-02-25 PROCEDURE — 85025 COMPLETE CBC W/AUTO DIFF WBC: CPT

## 2022-02-25 PROCEDURE — 99213 OFFICE O/P EST LOW 20 MIN: CPT | Performed by: INTERNAL MEDICINE

## 2022-02-25 NOTE — TELEPHONE ENCOUNTER
The cathlab called, patient is scheduled for a bone marrow biopsy on Tuesday, 3/1/22. They need a new hx and physical before that since its been over 30 days. Doesn't see Dr. Goldberg until April, is this a patient maybe Dr. Stockton could see for an updated hx and physical?

## 2022-02-25 NOTE — PROGRESS NOTES
Clinic Progress Note    Patient:  Lucía Wolff  YOB: 1960  Date of Service: 2/25/2022  MRN: [id number]   Acct:    Primary Care Physician: Denise Johnson APRN    Chief Complaint: Thrombocytopenia intermittent pancytopenia    Hematology History:  Ms. Wolff is a 61 y.o. is here for follow-up of thrombocytopenia and intermittent pancytopenia.  Per most recent note with the patient follow-up with Dr. Goldberg, she had a thorough investigation including a bone marrow biopsy in 11/2019 that showed the following:     Bone marrow biopsy 10/31/2019:  · A small clonal kappa B-cell population approximately 2% is present in a polytypic background.  Correlation with lymph node/spleen status, bone marrow core biopsy and other relevant clinical and laboratory information is recommended  · No evidence of abnormal myeloid maturation, and increased blast population or T-cell lymphoproliferative disorder  · Normal female karyotype  · Immunoglobulin heavy chain (IGH) gene rearrangement by PCR indeterminate for a clonal IGH gene arrangement.  Intermediate results most often indicate a suspicious or atypical pattern that does not meet laboratory criteria for detection of a monoclonal B-cell population.  In such cases, analysis of a new specimen may be informative.  Results should be interpreted in the context of clinical status and correlation with cytogenetics, morphology and immunophenotype  · Normocellular marrow with maturing trilineage hematopoiesis  · No morphological evidence of overt or advanced myelodysplasia, acute leukemia, metastatic neoplasm nor a plasma cell dyscrasia and  · Stainable iron present  · No significant fibrosis    The patient was also found to have splenomegaly measuring 17 cm with a normal appearing liver and reportedly had a liver biopsy in the past there was noted to have fatty liver.    As during her last visit with Dr. Goldberg in 1/2022, a platelet count had trended down  some, a bone marrow biopsy is being arranged.    Interval History:  Ms. Wolff is here for her scheduled follow-up prior to obtaining a bone marrow biopsy, as she has to have a clinic visit and exam within 30 days of her procedure.  She feels well overall today and has no complaints.  She denies having any fever, chills, chest pain, shortness of breath, abdominal pain, nausea or vomiting, change in bowel or urine habits, weakness or numbness arms or legs.    Objectives:  Vitals:    02/25/22 1152   BP: 142/82   Pulse: 90   Resp: 16   Temp: 98 °F (36.7 °C)   SpO2: 99%     ECOG Performance Status:  0  GENERAL: Alert and oriented, no apparent distress  HEENT: No scleral icterus  NECK: Supple  HEART: Regular rate and rhythm  LUNGS: Clear to auscultation bilaterally  ABDOMEN: Soft, nontender, nondistended  EXTREMITIES: Symmetric  SKIN: No jaundice  PSYCHIATRIC: Full affect  NEUROLOGIC: Stable gait    Results:  Lab Results   Component Value Date    WBC 3.25 (L) 02/25/2022    HGB 11.8 (L) 02/25/2022    HCT 35.0 02/25/2022    MCV 88.4 02/25/2022    PLT 87 (L) 02/25/2022       Assessment :  Ms. Wolff is a 61 y.o. who follow-up of thrombocytopenia and intermittent pancytopenia.    Plan:   # Thrombocytopenia and intermittent pancytopenia  - Her platelet counts overall has been close to 100, which is safe for her to proceed with procedures.  - Thrombocytopenia is deemed to be most likely related to splenic sequestration given her splenomegaly, this could also contribute to her intermittent leukopenia.   - She has mild intermittent anemia.    - She was planned to undergo repeat pulmonary biopsy when she saw Dr. Goldberg in 1/2022; we'll plan to proceed with this at this time.  - She has a follow-up visit with Dr. Goldberg in April 2022.    Matty Stockton MD  2/25/2022

## 2022-02-26 ENCOUNTER — LAB (OUTPATIENT)
Dept: LAB | Facility: HOSPITAL | Age: 62
End: 2022-02-26

## 2022-02-26 DIAGNOSIS — D69.6 THROMBOCYTOPENIA: ICD-10-CM

## 2022-02-26 LAB — SARS-COV-2 ORF1AB RESP QL NAA+PROBE: NOT DETECTED

## 2022-02-26 PROCEDURE — U0004 COV-19 TEST NON-CDC HGH THRU: HCPCS

## 2022-02-26 PROCEDURE — C9803 HOPD COVID-19 SPEC COLLECT: HCPCS

## 2022-03-01 ENCOUNTER — HOSPITAL ENCOUNTER (OUTPATIENT)
Dept: CT IMAGING | Facility: HOSPITAL | Age: 62
Discharge: HOME | End: 2022-03-01
Admitting: INTERNAL MEDICINE

## 2022-03-01 VITALS
HEART RATE: 68 BPM | OXYGEN SATURATION: 100 % | TEMPERATURE: 96.6 F | SYSTOLIC BLOOD PRESSURE: 118 MMHG | RESPIRATION RATE: 14 BRPM | DIASTOLIC BLOOD PRESSURE: 72 MMHG

## 2022-03-01 DIAGNOSIS — D69.6 THROMBOCYTOPENIA: ICD-10-CM

## 2022-03-01 DIAGNOSIS — R16.1 SPLENOMEGALY: ICD-10-CM

## 2022-03-01 LAB
APTT PPP: 26.4 SECONDS (ref 24.1–35)
BASOPHILS # BLD AUTO: 0 10*3/MM3 (ref 0–0.2)
BASOPHILS NFR BLD AUTO: 0 % (ref 0–1.5)
DEPRECATED RDW RBC AUTO: 50.2 FL (ref 37–54)
EOSINOPHIL # BLD AUTO: 0.02 10*3/MM3 (ref 0–0.4)
EOSINOPHIL NFR BLD AUTO: 0.9 % (ref 0.3–6.2)
ERYTHROCYTE [DISTWIDTH] IN BLOOD BY AUTOMATED COUNT: 15.9 % (ref 12.3–15.4)
HCT VFR BLD AUTO: 31.7 % (ref 34–46.6)
HGB BLD-MCNC: 10.6 G/DL (ref 12–15.9)
IMM GRANULOCYTES # BLD AUTO: 0.01 10*3/MM3 (ref 0–0.05)
IMM GRANULOCYTES NFR BLD AUTO: 0.4 % (ref 0–0.5)
INR PPP: 1.16 (ref 0.91–1.09)
LYMPHOCYTES # BLD AUTO: 0.43 10*3/MM3 (ref 0.7–3.1)
LYMPHOCYTES NFR BLD AUTO: 18.5 % (ref 19.6–45.3)
MCH RBC QN AUTO: 29.2 PG (ref 26.6–33)
MCHC RBC AUTO-ENTMCNC: 33.4 G/DL (ref 31.5–35.7)
MCV RBC AUTO: 87.3 FL (ref 79–97)
MONOCYTES # BLD AUTO: 0.19 10*3/MM3 (ref 0.1–0.9)
MONOCYTES NFR BLD AUTO: 8.2 % (ref 5–12)
NEUTROPHILS NFR BLD AUTO: 1.68 10*3/MM3 (ref 1.7–7)
NEUTROPHILS NFR BLD AUTO: 72 % (ref 42.7–76)
NRBC BLD AUTO-RTO: 0 /100 WBC (ref 0–0.2)
PLATELET # BLD AUTO: 76 10*3/MM3 (ref 140–450)
PMV BLD AUTO: 10.3 FL (ref 6–12)
PROTHROMBIN TIME: 14.4 SECONDS (ref 11.9–14.6)
RBC # BLD AUTO: 3.63 10*6/MM3 (ref 3.77–5.28)
WBC NRBC COR # BLD: 2.33 10*3/MM3 (ref 3.4–10.8)

## 2022-03-01 PROCEDURE — 88313 SPECIAL STAINS GROUP 2: CPT | Performed by: INTERNAL MEDICINE

## 2022-03-01 PROCEDURE — 85730 THROMBOPLASTIN TIME PARTIAL: CPT | Performed by: RADIOLOGY

## 2022-03-01 PROCEDURE — 25010000002 MIDAZOLAM PER 1 MG: Performed by: RADIOLOGY

## 2022-03-01 PROCEDURE — 88311 DECALCIFY TISSUE: CPT | Performed by: INTERNAL MEDICINE

## 2022-03-01 PROCEDURE — 85610 PROTHROMBIN TIME: CPT | Performed by: RADIOLOGY

## 2022-03-01 PROCEDURE — 25010000002 FENTANYL CITRATE (PF) 50 MCG/ML SOLUTION: Performed by: RADIOLOGY

## 2022-03-01 PROCEDURE — 77012 CT SCAN FOR NEEDLE BIOPSY: CPT

## 2022-03-01 PROCEDURE — 85025 COMPLETE CBC W/AUTO DIFF WBC: CPT | Performed by: RADIOLOGY

## 2022-03-01 PROCEDURE — 88305 TISSUE EXAM BY PATHOLOGIST: CPT | Performed by: INTERNAL MEDICINE

## 2022-03-01 RX ORDER — FENTANYL CITRATE 50 UG/ML
INJECTION, SOLUTION INTRAMUSCULAR; INTRAVENOUS
Status: COMPLETED | OUTPATIENT
Start: 2022-03-01 | End: 2022-03-01

## 2022-03-01 RX ORDER — MIDAZOLAM HYDROCHLORIDE 1 MG/ML
INJECTION INTRAMUSCULAR; INTRAVENOUS
Status: COMPLETED | OUTPATIENT
Start: 2022-03-01 | End: 2022-03-01

## 2022-03-01 RX ADMIN — MIDAZOLAM 0.5 MG: 1 INJECTION INTRAMUSCULAR; INTRAVENOUS at 11:58

## 2022-03-01 RX ADMIN — FENTANYL CITRATE 50 MCG: 50 INJECTION, SOLUTION INTRAMUSCULAR; INTRAVENOUS at 11:44

## 2022-03-01 RX ADMIN — MIDAZOLAM 1 MG: 1 INJECTION INTRAMUSCULAR; INTRAVENOUS at 11:44

## 2022-03-01 RX ADMIN — FENTANYL CITRATE 25 MCG: 50 INJECTION, SOLUTION INTRAMUSCULAR; INTRAVENOUS at 11:57

## 2022-03-01 NOTE — INTERVAL H&P NOTE
H&P reviewed. The patient was examined and there are no changes to the H&P.  Risks, alternatives and benefits explained to the patient. All questions were answered prior to the exam. Interval presedation assessment without interval change. Plan is for moderate sedation.

## 2022-03-11 ENCOUNTER — TELEPHONE (OUTPATIENT)
Dept: ONCOLOGY | Facility: CLINIC | Age: 62
End: 2022-03-11

## 2022-03-11 NOTE — TELEPHONE ENCOUNTER
Called to discuss the new findings of the bone marrow biopsy:       Bone marrow biopsy repeat from 3.1.22:  · A small monoclonal B cell population with a nonspecific immunophenotype (<1%)  · Cytogenetics shows normal female kayrotype  · B M Bx showig variably hypercellular marrow with maturing trilineage hematopoeisis, erythroid hyperplasia and mild interstitial nodular small lymphocytic infiltrate (5-10%)   · Adequate Fe storage  · No evidence of overt myelodysplasia nor plasma cell neoplasm  ? 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 FISH for BCL2, BCL6 and 7q will be attempted for further characterization.     We spoke at length that we do not have all the answers yet and that I will see her in April when all the results will likely be ready and at that time she will be switched over to either Dr. Hill or Dr. Hastings for further management

## 2022-03-28 PROBLEM — R16.1 SPLENOMEGALY: Status: ACTIVE | Noted: 2022-03-28

## 2022-03-28 PROBLEM — C83.07: Status: ACTIVE | Noted: 2022-03-28

## 2022-04-07 ENCOUNTER — LAB (OUTPATIENT)
Dept: LAB | Facility: HOSPITAL | Age: 62
End: 2022-04-07

## 2022-04-07 ENCOUNTER — OFFICE VISIT (OUTPATIENT)
Dept: ONCOLOGY | Facility: CLINIC | Age: 62
End: 2022-04-07

## 2022-04-07 VITALS
DIASTOLIC BLOOD PRESSURE: 74 MMHG | WEIGHT: 158.3 LBS | HEART RATE: 92 BPM | OXYGEN SATURATION: 99 % | TEMPERATURE: 98 F | BODY MASS INDEX: 26.37 KG/M2 | HEIGHT: 65 IN | SYSTOLIC BLOOD PRESSURE: 118 MMHG | RESPIRATION RATE: 16 BRPM

## 2022-04-07 DIAGNOSIS — R79.89 ELEVATED FERRITIN: ICD-10-CM

## 2022-04-07 DIAGNOSIS — R16.1 SPLENOMEGALY: ICD-10-CM

## 2022-04-07 DIAGNOSIS — R16.1 SPLENOMEGALY: Primary | ICD-10-CM

## 2022-04-07 DIAGNOSIS — C83.07 MARGINAL ZONE LYMPHOMA OF SPLEEN: ICD-10-CM

## 2022-04-07 LAB
ALBUMIN SERPL-MCNC: 4.7 G/DL (ref 3.5–5.2)
ALBUMIN/GLOB SERPL: 3.4 G/DL
ALP SERPL-CCNC: 78 U/L (ref 39–117)
ALT SERPL W P-5'-P-CCNC: 27 U/L (ref 1–33)
ANION GAP SERPL CALCULATED.3IONS-SCNC: 10 MMOL/L (ref 5–15)
AST SERPL-CCNC: 22 U/L (ref 1–32)
BASOPHILS # BLD AUTO: 0.01 10*3/MM3 (ref 0–0.2)
BASOPHILS NFR BLD AUTO: 0.4 % (ref 0–1.5)
BILIRUB SERPL-MCNC: 1.6 MG/DL (ref 0–1.2)
BUN SERPL-MCNC: 15 MG/DL (ref 6–20)
BUN/CREAT SERPL: 28.8 (ref 7–25)
CALCIUM SPEC-SCNC: 9.4 MG/DL (ref 8.6–10.5)
CHLORIDE SERPL-SCNC: 106 MMOL/L (ref 98–107)
CO2 SERPL-SCNC: 25 MMOL/L (ref 22–29)
CREAT SERPL-MCNC: 0.52 MG/DL (ref 0.57–1)
DEPRECATED RDW RBC AUTO: 56.3 FL (ref 37–54)
EGFRCR SERPLBLD CKD-EPI 2021: 107.2 ML/MIN/1.73
EOSINOPHIL # BLD AUTO: 0.03 10*3/MM3 (ref 0–0.4)
EOSINOPHIL NFR BLD AUTO: 1.3 % (ref 0.3–6.2)
ERYTHROCYTE [DISTWIDTH] IN BLOOD BY AUTOMATED COUNT: 17 % (ref 12.3–15.4)
GLOBULIN UR ELPH-MCNC: 1.4 GM/DL
GLUCOSE SERPL-MCNC: 208 MG/DL (ref 65–99)
HCT VFR BLD AUTO: 33.8 % (ref 34–46.6)
HGB BLD-MCNC: 11.2 G/DL (ref 12–15.9)
LDH SERPL-CCNC: 242 U/L (ref 135–214)
LYMPHOCYTES # BLD AUTO: 0.48 10*3/MM3 (ref 0.7–3.1)
LYMPHOCYTES NFR BLD AUTO: 21.4 % (ref 19.6–45.3)
MCH RBC QN AUTO: 29.7 PG (ref 26.6–33)
MCHC RBC AUTO-ENTMCNC: 33.1 G/DL (ref 31.5–35.7)
MCV RBC AUTO: 89.7 FL (ref 79–97)
MONOCYTES # BLD AUTO: 0.17 10*3/MM3 (ref 0.1–0.9)
MONOCYTES NFR BLD AUTO: 7.6 % (ref 5–12)
NEUTROPHILS NFR BLD AUTO: 1.54 10*3/MM3 (ref 1.7–7)
NEUTROPHILS NFR BLD AUTO: 68.9 % (ref 42.7–76)
PLATELET # BLD AUTO: 74 10*3/MM3 (ref 140–450)
PMV BLD AUTO: 11.1 FL (ref 6–12)
POTASSIUM SERPL-SCNC: 4.1 MMOL/L (ref 3.5–5.2)
PROT SERPL-MCNC: 6.1 G/DL (ref 6–8.5)
RBC # BLD AUTO: 3.77 10*6/MM3 (ref 3.77–5.28)
SODIUM SERPL-SCNC: 141 MMOL/L (ref 136–145)
WBC NRBC COR # BLD: 2.24 10*3/MM3 (ref 3.4–10.8)

## 2022-04-07 PROCEDURE — 86705 HEP B CORE ANTIBODY IGM: CPT

## 2022-04-07 PROCEDURE — 86704 HEP B CORE ANTIBODY TOTAL: CPT

## 2022-04-07 PROCEDURE — 81256 HFE GENE: CPT

## 2022-04-07 PROCEDURE — 87340 HEPATITIS B SURFACE AG IA: CPT

## 2022-04-07 PROCEDURE — 36415 COLL VENOUS BLD VENIPUNCTURE: CPT

## 2022-04-07 PROCEDURE — 99215 OFFICE O/P EST HI 40 MIN: CPT | Performed by: INTERNAL MEDICINE

## 2022-04-07 PROCEDURE — 86706 HEP B SURFACE ANTIBODY: CPT

## 2022-04-07 PROCEDURE — 85025 COMPLETE CBC W/AUTO DIFF WBC: CPT

## 2022-04-07 PROCEDURE — 80053 COMPREHEN METABOLIC PANEL: CPT

## 2022-04-07 PROCEDURE — 87350 HEPATITIS BE AG IA: CPT

## 2022-04-07 PROCEDURE — 86707 HEPATITIS BE ANTIBODY: CPT

## 2022-04-07 PROCEDURE — 83615 LACTATE (LD) (LDH) ENZYME: CPT

## 2022-04-08 LAB
HBV CORE AB SERPL QL IA: NEGATIVE
HBV CORE IGM SERPL QL IA: NEGATIVE
HBV E AB SERPL QL IA: NEGATIVE
HBV E AG SERPL QL IA: NEGATIVE
HBV SURFACE AB SER QL: NON REACTIVE
HBV SURFACE AG SERPL QL IA: NEGATIVE

## 2022-04-15 LAB — REF LAB TEST METHOD: NORMAL

## 2022-04-18 LAB — HFE GENE MUT ANL BLD/T: NORMAL

## 2022-05-02 LAB
REF LAB TEST METHOD: NORMAL
REF LAB TEST METHOD: NORMAL

## 2022-05-05 ENCOUNTER — APPOINTMENT (OUTPATIENT)
Dept: LAB | Facility: HOSPITAL | Age: 62
End: 2022-05-05

## 2022-05-05 ENCOUNTER — LAB (OUTPATIENT)
Dept: LAB | Facility: HOSPITAL | Age: 62
End: 2022-05-05

## 2022-05-05 ENCOUNTER — TELEPHONE (OUTPATIENT)
Dept: ONCOLOGY | Facility: CLINIC | Age: 62
End: 2022-05-05

## 2022-05-05 ENCOUNTER — OFFICE VISIT (OUTPATIENT)
Dept: ONCOLOGY | Facility: CLINIC | Age: 62
End: 2022-05-05

## 2022-05-05 VITALS
WEIGHT: 158 LBS | SYSTOLIC BLOOD PRESSURE: 122 MMHG | OXYGEN SATURATION: 98 % | HEIGHT: 65 IN | TEMPERATURE: 98.9 F | BODY MASS INDEX: 26.33 KG/M2 | DIASTOLIC BLOOD PRESSURE: 64 MMHG | RESPIRATION RATE: 18 BRPM | HEART RATE: 88 BPM

## 2022-05-05 DIAGNOSIS — D61.818 PANCYTOPENIA: Primary | ICD-10-CM

## 2022-05-05 LAB
ALBUMIN SERPL-MCNC: 4.6 G/DL (ref 3.5–5.2)
ALBUMIN/GLOB SERPL: 3.3 G/DL
ALP SERPL-CCNC: 82 U/L (ref 39–117)
ALT SERPL W P-5'-P-CCNC: 19 U/L (ref 1–33)
ANION GAP SERPL CALCULATED.3IONS-SCNC: 10 MMOL/L (ref 5–15)
AST SERPL-CCNC: 17 U/L (ref 1–32)
BASOPHILS # BLD AUTO: 0.01 10*3/MM3 (ref 0–0.2)
BASOPHILS NFR BLD AUTO: 0.4 % (ref 0–1.5)
BILIRUB SERPL-MCNC: 1.8 MG/DL (ref 0–1.2)
BUN SERPL-MCNC: 7 MG/DL (ref 6–20)
BUN/CREAT SERPL: 13 (ref 7–25)
CALCIUM SPEC-SCNC: 9.1 MG/DL (ref 8.6–10.5)
CHLORIDE SERPL-SCNC: 106 MMOL/L (ref 98–107)
CO2 SERPL-SCNC: 26 MMOL/L (ref 22–29)
CREAT SERPL-MCNC: 0.54 MG/DL (ref 0.57–1)
DEPRECATED RDW RBC AUTO: 55.2 FL (ref 37–54)
EGFRCR SERPLBLD CKD-EPI 2021: 106.2 ML/MIN/1.73
EOSINOPHIL # BLD AUTO: 0.03 10*3/MM3 (ref 0–0.4)
EOSINOPHIL NFR BLD AUTO: 1.1 % (ref 0.3–6.2)
ERYTHROCYTE [DISTWIDTH] IN BLOOD BY AUTOMATED COUNT: 17.1 % (ref 12.3–15.4)
GLOBULIN UR ELPH-MCNC: 1.4 GM/DL
GLUCOSE SERPL-MCNC: 216 MG/DL (ref 65–99)
HCT VFR BLD AUTO: 33.6 % (ref 34–46.6)
HGB BLD-MCNC: 10.7 G/DL (ref 12–15.9)
LDH SERPL-CCNC: 278 U/L (ref 135–214)
LYMPHOCYTES # BLD AUTO: 0.5 10*3/MM3 (ref 0.7–3.1)
LYMPHOCYTES NFR BLD AUTO: 18.9 % (ref 19.6–45.3)
MCH RBC QN AUTO: 28.5 PG (ref 26.6–33)
MCHC RBC AUTO-ENTMCNC: 31.8 G/DL (ref 31.5–35.7)
MCV RBC AUTO: 89.6 FL (ref 79–97)
MONOCYTES # BLD AUTO: 0.16 10*3/MM3 (ref 0.1–0.9)
MONOCYTES NFR BLD AUTO: 6.1 % (ref 5–12)
NEUTROPHILS NFR BLD AUTO: 1.93 10*3/MM3 (ref 1.7–7)
NEUTROPHILS NFR BLD AUTO: 73.1 % (ref 42.7–76)
PLATELET # BLD AUTO: 85 10*3/MM3 (ref 140–450)
PMV BLD AUTO: 10 FL (ref 6–12)
POTASSIUM SERPL-SCNC: 4.4 MMOL/L (ref 3.5–5.2)
PROT SERPL-MCNC: 6 G/DL (ref 6–8.5)
RBC # BLD AUTO: 3.75 10*6/MM3 (ref 3.77–5.28)
SODIUM SERPL-SCNC: 142 MMOL/L (ref 136–145)
WBC NRBC COR # BLD: 2.64 10*3/MM3 (ref 3.4–10.8)

## 2022-05-05 PROCEDURE — 99214 OFFICE O/P EST MOD 30 MIN: CPT | Performed by: INTERNAL MEDICINE

## 2022-05-05 PROCEDURE — 85025 COMPLETE CBC W/AUTO DIFF WBC: CPT | Performed by: INTERNAL MEDICINE

## 2022-05-05 PROCEDURE — 36415 COLL VENOUS BLD VENIPUNCTURE: CPT | Performed by: INTERNAL MEDICINE

## 2022-05-05 PROCEDURE — 83615 LACTATE (LD) (LDH) ENZYME: CPT | Performed by: INTERNAL MEDICINE

## 2022-05-05 PROCEDURE — 80053 COMPREHEN METABOLIC PANEL: CPT | Performed by: INTERNAL MEDICINE

## 2022-05-05 NOTE — TELEPHONE ENCOUNTER
----- Message from Benigno Hastings MD sent at 5/5/2022 11:07 AM CDT -----  Notify patient, stable LFTs, LDH and CBC. Glucose 216.     Called patient with the above information. She verbalized understanding.    rp

## 2022-06-12 LAB
LAB AP CASE REPORT: NORMAL
LAB AP FLOW CYTOMETRY SUMMARY: NORMAL
PATH REPORT.FINAL DX SPEC: NORMAL
PATH REPORT.GROSS SPEC: NORMAL

## 2022-06-15 ENCOUNTER — TELEPHONE (OUTPATIENT)
Dept: ONCOLOGY | Facility: CLINIC | Age: 62
End: 2022-06-15

## 2022-06-17 NOTE — PROGRESS NOTES
[facility] HEMATOLOGY & ONCOLOGY  [address]  [place] [address] [phone]    Patient Name: Lucía Wolff  Encounter Date: 06/22/2022  YOB: 1960  Patient Number: [id number]      REASON FOR FOLLOW-UP: Lucía Wolff is a pleasant 61 y.o. 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  She is also seen for splenomegaly from B-cell lymphoma.  She is here with friend.  She is a reliable historian.        DIAGNOSTIC ABNORMALITIES:  CBC 01/17/2022. Remarkable for platelet 97. Bilirubin 1.9.   CT neck 01/19/2022.  No cervical lymphadenopathy by size criteria.  Asymmetric appearance of the left submandibular gland. Correlation for sialoadenitis recommended. If indicated, dedicated ultrasound may be  Performed.  CT chest 01/19/2022. No intrathoracic lymphadenopathy. No axillary lymphadenopathy  CT abdomen and pelvis 01/19/2022. Massive splenomegaly.  Splenic artery aneurysm, 1.5 cm.  No additional abdominal or pelvic lymphadenopathy.  PET 01/25/2022. No hypermetabolic lymphadenopathy or tumor. No scintigraphic evidence of malignancy. . Persistent prominent splenomegaly.        PREVIOUS INTERVENTIONS:None.        Oncology/Hematology History    No history exists.       PAST MEDICAL HISTORY:  ALLERGIES:  No Known Allergies  CURRENT MEDICATIONS:  Outpatient Encounter Medications as of 6/22/2022   Medication Sig Dispense Refill   • acetaminophen (TYLENOL) 325 MG tablet Take 650 mg by mouth Every 6 (Six) Hours As Needed for Mild Pain .     • calcium carbonate (OS-MITRA) 600 MG tablet Take 600 mg by mouth 2 (Two) Times a Day With Meals.     • Cholecalciferol (vitamin D3) 125 MCG (5000 UT) capsule capsule Take 5,000 Units by mouth Daily.     • Cinnamon 500 MG capsule Every 12 (Twelve) Hours.     • cyanocobalamin 1000 MCG/ML injection INJECT 1 ML EVERY 28 DAYS. 12 mL 0  "  • Dextromethorphan-guaiFENesin (MUCINEX DM PO) Take 1 tablet by mouth 2 (Two) Times a Day As Needed.     • folic acid (FOLVITE) 1 MG tablet Take 1 mg by mouth Daily.     • metFORMIN ER (GLUCOPHAGE-XR) 500 MG 24 hr tablet      • Multiple Vitamins-Minerals (MULTIVITAMIN WITH MINERALS) tablet tablet Take 1 tablet by mouth Daily.     • Omega-3 300 MG capsule Take 1,200 mg by mouth Daily.     • Syringe 25G X 1\" 3 ML misc USE 1 SYRINGE PER B12 INJECTION 12 each 0   • Thyroid 65 MG PO tablet Take 65 mg by mouth Daily.     • VIT B12-METHIONINE-INOS-CHOL IM Inject 1 mL into the appropriate muscle as directed by prescriber Every 30 (Thirty) Days. Has not taken since 2020 at least        No facility-administered encounter medications on file as of 6/22/2022.     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   • Splenomegaly R16.1     SURGERIES:  Past Surgical History:   Procedure Laterality Date   • CHOLECYSTECTOMY     • HYSTERECTOMY  2003    PARTIAL   • LIVER BIOPSY  08/12/2019     HEALTH MAINTENANCE ITEMS:  Health Maintenance Due   Topic Date Due   • COLORECTAL CANCER SCREENING  Never done   • ANNUAL PHYSICAL  Never done   • Pneumococcal Vaccine 0-64 (1 - PCV) Never done   • TDAP/TD VACCINES (1 - Tdap) Never done   • ZOSTER VACCINE (1 of 2) Never done   • PAP SMEAR  Never done   • MAMMOGRAM  11/01/2020   • COVID-19 Vaccine (4 - Booster for Pfizer series) 02/28/2022       <no information>  Last Completed Colonoscopy     This patient has no relevant Health Maintenance data.        Immunization History   Administered Date(s) Administered   • COVID-19 (PFIZER) PURPLE CAP 03/26/2021, 04/16/2021, 11/29/2021     Last Completed Mammogram     This patient has no relevant Health Maintenance data.            FAMILY HISTORY:  Family History   Problem Relation Age of Onset   • Alzheimer's disease Mother    • " "Diabetes Father    • Stroke Father    • Cardiomyopathy Father    • Heart attack Father    • Heart disease Father    • Hypertension Father    • Cancer Brother         She is unclear of the kind of cancer   • Diabetes Brother    • Diabetes Brother    • Hypertension Brother    • No Known Problems Brother    • No Known Problems Son    • No Known Problems Son    • No Known Problems Daughter      SOCIAL HISTORY:  Social History     Socioeconomic History   • Marital status:    Tobacco Use   • Smoking status: Never Smoker   • Smokeless tobacco: Never Used   • Tobacco comment: has tried it but did not keep smoking   Substance and Sexual Activity   • Alcohol use: Never   • Drug use: No   • Sexual activity: Defer       REVIEW OF SYSTEMS:    Review of Systems   Constitutional: Positive for fatigue. Negative for chills and fever.        \"I feel 8/10.\"   HENT: Negative for congestion, nosebleeds and trouble swallowing.    Eyes: Negative for discharge and redness.   Respiratory: Negative for cough, shortness of breath and wheezing.    Cardiovascular: Negative for chest pain and leg swelling.   Gastrointestinal: Negative for abdominal pain, nausea and vomiting.   Endocrine: Negative for polydipsia and polyphagia.   Genitourinary: Negative for difficulty urinating, dysuria and flank pain.   Musculoskeletal: Negative for gait problem and joint swelling.   Skin: Positive for pallor.   Allergic/Immunologic: Negative for food allergies.   Neurological: Negative for dizziness, facial asymmetry and speech difficulty.   Hematological: Negative for adenopathy. Does not bruise/bleed easily.   Psychiatric/Behavioral: Negative for agitation, confusion and hallucinations.       VITAL SIGNS: /62   Pulse 94   Resp 18   Ht 165.1 cm (65\")   Wt 72.7 kg (160 lb 3.2 oz)   SpO2 98%   Breastfeeding No   BMI 26.66 kg/m²   Pain Score    06/22/22 1028   PainSc: 0-No pain       PHYSICAL EXAMINATION:     Physical Exam  Vitals reviewed. "   Constitutional:       General: She is not in acute distress.  HENT:      Head: Normocephalic and atraumatic.   Eyes:      General: No scleral icterus.  Cardiovascular:      Rate and Rhythm: Normal rate.   Pulmonary:      Breath sounds: No wheezing or rales.   Abdominal:      General: Bowel sounds are normal.      Palpations: Abdomen is soft.      Comments: Spleen 6 cm below left subcostal margin.    Musculoskeletal:         General: No swelling.      Cervical back: Neck supple.   Skin:     General: Skin is warm.      Coloration: Skin is pale.   Neurological:      Mental Status: She is alert and oriented to person, place, and time.   Psychiatric:         Mood and Affect: Mood normal.         Behavior: Behavior normal.         Thought Content: Thought content normal.         Judgment: Judgment normal.         LABS    Lab Results - Last 18 Months   Lab Units 05/05/22  1024 04/07/22  0820 03/01/22  1033 02/25/22  1134 01/17/22  1246 06/10/21  0750   HEMOGLOBIN g/dL 10.7* 11.2* 10.6* 11.8* 12.1 11.6*   HEMATOCRIT % 33.6* 33.8* 31.7* 35.0 36.5 35.4   MCV fL 89.6 89.7 87.3 88.4 86.1 88.3   WBC 10*3/mm3 2.64* 2.24* 2.33* 3.25* 3.54 3.16*   RDW % 17.1* 17.0* 15.9* 15.8* 16.2* 15.6*   MPV fL 10.0 11.1 10.3 10.7 10.6 10.1   PLATELETS 10*3/mm3 85* 74* 76* 87* 97* 113*   IMM GRAN % %  --   --  0.4  --   --  0.6*   NEUTROS ABS 10*3/mm3 1.93 1.54* 1.68* 2.37 2.41 1.97   LYMPHS ABS 10*3/mm3 0.50* 0.48* 0.43* 0.58* 0.89 0.91   MONOS ABS 10*3/mm3 0.16 0.17 0.19 0.24 0.19 0.19   EOS ABS 10*3/mm3 0.03 0.03 0.02 0.03 0.03 0.06   BASOS ABS 10*3/mm3 0.01 0.01 0.00 0.01 0.01 0.01   IMMATURE GRANS (ABS) 10*3/mm3  --   --  0.01  --   --  0.02   NRBC /100 WBC  --   --  0.0  --   --   --        Lab Results - Last 18 Months   Lab Units 05/05/22  1024 04/07/22  0820 01/19/22  0944 01/17/22  1246 06/10/21  0750   GLUCOSE mg/dL 216* 208*  --  208* 187*   SODIUM mmol/L 142 141  --  140 144   POTASSIUM mmol/L 4.4 4.1  --  4.3 3.8   CO2 mmol/L 26.0  25.0  --  23.0 26.0   CHLORIDE mmol/L 106 106  --  103 108*   ANION GAP mmol/L 10.0 10.0  --  14.0 10.0   CREATININE mg/dL 0.54* 0.52* 0.60 0.53* 0.57   BUN mg/dL 7 15  --  12 6   BUN / CREAT RATIO  13.0 28.8*  --  22.6 10.5   CALCIUM mg/dL 9.1 9.4  --  9.2 9.0   EGFR IF NONAFRICN AM mL/min/1.73  --   --   --  118 109   ALK PHOS U/L 82 78  --  94 74   TOTAL PROTEIN g/dL 6.0 6.1  --  6.7 6.2   ALT (SGPT) U/L 19 27  --  27 27   AST (SGOT) U/L 17 22  --  17 25   BILIRUBIN mg/dL 1.8* 1.6*  --  1.9* 1.7*   ALBUMIN g/dL 4.60 4.70  --  4.90 4.50   GLOBULIN gm/dL 1.4 1.4  --  1.8 1.7       Lab Results - Last 18 Months   Lab Units 05/12/22  1029 05/05/22  1024 04/07/22  0820 01/17/22  1246   URIC ACID mg/dL 8.0*  --   --   --    LDH unit/L 285* 278* 242* 214   REFERENCE LAB REPORT   --   --  See attached report  See Attached Result   See Attached Report  --        Lab Results - Last 18 Months   Lab Units 05/12/22  1029 01/17/22  1246 06/10/21  0916   IRON mcg/dL  --  89  --    TIBC mcg/dL  --  344  --    IRON SATURATION %  --  26  --    FERRITIN ng/mL 262* 380.20*  --    TSH uIU/mL  --  1.230  --    FOLATE ng/mL  --  10.50 3.98*       Lucía Wolff reports a pain score of 0.        ASSESSMENT:  1.  Pancytopenia, from hypersplenism.  2.  2% monoclonal B-cell population on previous bone marrow biopsy 10/31/2019.  Repeat bone marrow biopsy 03/01/2022.  Less than 1% small monoclonal B-cell population with a nonspecific immune phenotype.  Normal cytogenetics.  Variably hypercellular marrow with maturing trilineage hematopoiesis, erythroid hyperplasia and mild interstitial nodule small lymphocytic infiltrate, 5 to 10% adequate iron storage.  No evidence of overt myelodysplasia or plasma cell neoplasm.  Negative JAK2 and BCR ABL.  Negative Bcl-2, BCL6, 7 q. and MYB 88.  Stable for observation.  3.  Splenomegaly 18 cm on 01/19/2022.  Negative PET on 01/25/2022.  No adenopathies on CT of the chest, abdomen and pelvis on  01/19/2022.  4.  Anemia of chronic disease.  5.  Weight loss secondary to early satiety and hypersplenism.       PLAN:  1.   Re: Recommendations from [place] on 05/12/2022.  The palpable splenomegaly in the absence of additional findings to suspect avid adenopathy and liver disease suggested the patient has hypersplenism.  Other differential include nutritional deficiencies or cyclical neutropenia.  The enlarging spleen is not significantly PET avid therefore splenectomy would not be recommended at this time.  Other reason is infection with postsplenectomy state.  Would recommend splenectomy if the patient continues to develop new symptoms for example losing another 10 pounds in 6 months, recurrent major infections, transfusion dependent cytopenias or splenic infarcts.  JAK2 was negative, normal copper at 123.2, B12 at 748, reticulocyte 4%, negative hepatitis C antibody, uric acid 8, WBC 3.8, hemoglobin 12.4, MCV 89, platelet 116, bilirubin 2.5, , and ferritin 262 at [place].  2.   Re: Heme status. None.   3.   Re: CMP. None.  4.   Re: LDH. None.  5.   Re: Stable for close observation.  6.  CBC with differential in 6 weeks.  7.  Continue care per primary care physician and other specialist.  8.  Plan of care discussed with patient and friend.  Understanding expressed.  Patient agreeable to proceed.  9.  Return to office in 3 months with CBC with differential, CMP, LDH, ferritin and iron profile, same day.  10. CBC with differential, CMP, and LDH today.        I have reviewed the assessment and plan and verified the accuracy of it. No changes to assessment and plan since the information was documented. Benigno Hastings MD 06/22/22         I spent 31 total minutes, face-to-face, caring for Lucía today.  Greater than 50% of this time involved counseling and/or coordination of care as documented within this note regarding the patient's illness(es), pros and cons of various  treatment options, instructions and/or risk reduction.          (Rio Pena MD [place])  ANAIS Montano

## 2022-06-22 ENCOUNTER — TELEPHONE (OUTPATIENT)
Dept: ONCOLOGY | Facility: CLINIC | Age: 62
End: 2022-06-22

## 2022-06-22 ENCOUNTER — LAB (OUTPATIENT)
Dept: LAB | Facility: HOSPITAL | Age: 62
End: 2022-06-22

## 2022-06-22 ENCOUNTER — OFFICE VISIT (OUTPATIENT)
Dept: ONCOLOGY | Facility: CLINIC | Age: 62
End: 2022-06-22

## 2022-06-22 VITALS
HEIGHT: 65 IN | SYSTOLIC BLOOD PRESSURE: 118 MMHG | BODY MASS INDEX: 26.69 KG/M2 | RESPIRATION RATE: 18 BRPM | HEART RATE: 94 BPM | OXYGEN SATURATION: 98 % | DIASTOLIC BLOOD PRESSURE: 62 MMHG | WEIGHT: 160.2 LBS

## 2022-06-22 DIAGNOSIS — D61.818 ACQUIRED PANCYTOPENIA: ICD-10-CM

## 2022-06-22 DIAGNOSIS — D61.818 ACQUIRED PANCYTOPENIA: Primary | ICD-10-CM

## 2022-06-22 LAB
ALBUMIN SERPL-MCNC: 4.8 G/DL (ref 3.5–5.2)
ALBUMIN/GLOB SERPL: 3 G/DL
ALP SERPL-CCNC: 85 U/L (ref 39–117)
ALT SERPL W P-5'-P-CCNC: 14 U/L (ref 1–33)
ANION GAP SERPL CALCULATED.3IONS-SCNC: 11 MMOL/L (ref 5–15)
ANISOCYTOSIS BLD QL: ABNORMAL
AST SERPL-CCNC: 20 U/L (ref 1–32)
BILIRUB SERPL-MCNC: 1.9 MG/DL (ref 0–1.2)
BUN SERPL-MCNC: 10 MG/DL (ref 6–20)
BUN/CREAT SERPL: 18.9 (ref 7–25)
CALCIUM SPEC-SCNC: 9.2 MG/DL (ref 8.6–10.5)
CHLORIDE SERPL-SCNC: 102 MMOL/L (ref 98–107)
CO2 SERPL-SCNC: 25 MMOL/L (ref 22–29)
CREAT SERPL-MCNC: 0.53 MG/DL (ref 0.57–1)
DEPRECATED RDW RBC AUTO: 57.3 FL (ref 37–54)
EGFRCR SERPLBLD CKD-EPI 2021: 106.7 ML/MIN/1.73
EOSINOPHIL # BLD MANUAL: 0.02 10*3/MM3 (ref 0–0.4)
EOSINOPHIL NFR BLD MANUAL: 1 % (ref 0.3–6.2)
ERYTHROCYTE [DISTWIDTH] IN BLOOD BY AUTOMATED COUNT: 18.3 % (ref 12.3–15.4)
GIANT PLATELETS: ABNORMAL
GLOBULIN UR ELPH-MCNC: 1.6 GM/DL
GLUCOSE SERPL-MCNC: 242 MG/DL (ref 65–99)
HCT VFR BLD AUTO: 29.6 % (ref 34–46.6)
HGB BLD-MCNC: 9.9 G/DL (ref 12–15.9)
LDH SERPL-CCNC: 379 U/L (ref 135–214)
LYMPHOCYTES # BLD MANUAL: 0.55 10*3/MM3 (ref 0.7–3.1)
LYMPHOCYTES NFR BLD MANUAL: 3.1 % (ref 5–12)
MCH RBC QN AUTO: 29.6 PG (ref 26.6–33)
MCHC RBC AUTO-ENTMCNC: 33.4 G/DL (ref 31.5–35.7)
MCV RBC AUTO: 88.4 FL (ref 79–97)
MICROCYTES BLD QL: ABNORMAL
MONOCYTES # BLD: 0.08 10*3/MM3 (ref 0.1–0.9)
NEUTROPHILS # BLD AUTO: 1.8 10*3/MM3 (ref 1.7–7)
NEUTROPHILS NFR BLD MANUAL: 70.4 % (ref 42.7–76)
NEUTS BAND NFR BLD MANUAL: 3.1 % (ref 0–5)
PLATELET # BLD AUTO: 88 10*3/MM3 (ref 140–450)
PMV BLD AUTO: 10.6 FL (ref 6–12)
POIKILOCYTOSIS BLD QL SMEAR: ABNORMAL
POLYCHROMASIA BLD QL SMEAR: ABNORMAL
POTASSIUM SERPL-SCNC: 4.3 MMOL/L (ref 3.5–5.2)
PROT SERPL-MCNC: 6.4 G/DL (ref 6–8.5)
RBC # BLD AUTO: 3.35 10*6/MM3 (ref 3.77–5.28)
SMALL PLATELETS BLD QL SMEAR: ABNORMAL
SODIUM SERPL-SCNC: 138 MMOL/L (ref 136–145)
VARIANT LYMPHS NFR BLD MANUAL: 19.4 % (ref 19.6–45.3)
VARIANT LYMPHS NFR BLD MANUAL: 3.1 % (ref 0–5)
WBC MORPH BLD: NORMAL
WBC NRBC COR # BLD: 2.45 10*3/MM3 (ref 3.4–10.8)

## 2022-06-22 PROCEDURE — 85025 COMPLETE CBC W/AUTO DIFF WBC: CPT

## 2022-06-22 PROCEDURE — 85007 BL SMEAR W/DIFF WBC COUNT: CPT

## 2022-06-22 PROCEDURE — 36415 COLL VENOUS BLD VENIPUNCTURE: CPT

## 2022-06-22 PROCEDURE — 99214 OFFICE O/P EST MOD 30 MIN: CPT | Performed by: INTERNAL MEDICINE

## 2022-06-22 PROCEDURE — 80053 COMPREHEN METABOLIC PANEL: CPT

## 2022-06-22 PROCEDURE — 83615 LACTATE (LD) (LDH) ENZYME: CPT

## 2022-06-22 RX ORDER — METFORMIN HYDROCHLORIDE 500 MG/1
TABLET, EXTENDED RELEASE ORAL
COMMUNITY
Start: 2022-06-15

## 2022-06-22 RX ORDER — AMPICILLIN TRIHYDRATE 250 MG
CAPSULE ORAL EVERY 12 HOURS SCHEDULED
COMMUNITY

## 2022-06-22 NOTE — TELEPHONE ENCOUNTER
Contacted patient Lucía Wolff that Dr Hastings has reviewed her labs and from Hem standpoint all is stable at this time. LFT Stable  LDH stable  Glucose is elevated 242, patient states she is watching her diet and had ate prior to lab check

## 2022-06-22 NOTE — TELEPHONE ENCOUNTER
----- Message from Benigno Hastings MD sent at 6/22/2022 12:37 PM CDT -----  Notify patient.  Glucose 242.  Heme status stable.  Stable LFTs and LDH.

## 2022-08-03 ENCOUNTER — LAB (OUTPATIENT)
Dept: LAB | Facility: HOSPITAL | Age: 62
End: 2022-08-03

## 2022-08-03 ENCOUNTER — TELEPHONE (OUTPATIENT)
Dept: ONCOLOGY | Facility: CLINIC | Age: 62
End: 2022-08-03

## 2022-08-03 DIAGNOSIS — D61.818 ACQUIRED PANCYTOPENIA: ICD-10-CM

## 2022-08-03 LAB
BASOPHILS # BLD AUTO: 0.01 10*3/MM3 (ref 0–0.2)
BASOPHILS NFR BLD AUTO: 0.5 % (ref 0–1.5)
DEPRECATED RDW RBC AUTO: 56.3 FL (ref 37–54)
EOSINOPHIL # BLD AUTO: 0.03 10*3/MM3 (ref 0–0.4)
EOSINOPHIL NFR BLD AUTO: 1.6 % (ref 0.3–6.2)
ERYTHROCYTE [DISTWIDTH] IN BLOOD BY AUTOMATED COUNT: 17.3 % (ref 12.3–15.4)
HCT VFR BLD AUTO: 27.4 % (ref 34–46.6)
HGB BLD-MCNC: 8.6 G/DL (ref 12–15.9)
IMM GRANULOCYTES # BLD AUTO: 0.01 10*3/MM3 (ref 0–0.05)
IMM GRANULOCYTES NFR BLD AUTO: 0.5 % (ref 0–0.5)
LYMPHOCYTES # BLD AUTO: 0.51 10*3/MM3 (ref 0.7–3.1)
LYMPHOCYTES NFR BLD AUTO: 27.1 % (ref 19.6–45.3)
MCH RBC QN AUTO: 28.4 PG (ref 26.6–33)
MCHC RBC AUTO-ENTMCNC: 31.4 G/DL (ref 31.5–35.7)
MCV RBC AUTO: 90.4 FL (ref 79–97)
MONOCYTES # BLD AUTO: 0.16 10*3/MM3 (ref 0.1–0.9)
MONOCYTES NFR BLD AUTO: 8.5 % (ref 5–12)
NEUTROPHILS NFR BLD AUTO: 1.16 10*3/MM3 (ref 1.7–7)
NEUTROPHILS NFR BLD AUTO: 61.8 % (ref 42.7–76)
NRBC BLD AUTO-RTO: 0 /100 WBC (ref 0–0.2)
PLATELET # BLD AUTO: 73 10*3/MM3 (ref 140–450)
PMV BLD AUTO: 9.9 FL (ref 6–12)
RBC # BLD AUTO: 3.03 10*6/MM3 (ref 3.77–5.28)
WBC NRBC COR # BLD: 1.88 10*3/MM3 (ref 3.4–10.8)

## 2022-08-03 PROCEDURE — 36415 COLL VENOUS BLD VENIPUNCTURE: CPT

## 2022-08-03 PROCEDURE — 85025 COMPLETE CBC W/AUTO DIFF WBC: CPT

## 2022-09-13 DIAGNOSIS — D64.9 ANEMIA, UNSPECIFIED TYPE: ICD-10-CM

## 2022-09-13 DIAGNOSIS — D69.6 THROMBOCYTOPENIA: Primary | ICD-10-CM

## 2022-09-21 ENCOUNTER — APPOINTMENT (OUTPATIENT)
Dept: LAB | Facility: HOSPITAL | Age: 62
End: 2022-09-21

## 2022-10-11 ENCOUNTER — LAB (OUTPATIENT)
Dept: LAB | Facility: HOSPITAL | Age: 62
End: 2022-10-11

## 2022-10-11 ENCOUNTER — OFFICE VISIT (OUTPATIENT)
Dept: ONCOLOGY | Facility: CLINIC | Age: 62
End: 2022-10-11

## 2022-10-11 VITALS
DIASTOLIC BLOOD PRESSURE: 72 MMHG | HEIGHT: 65 IN | BODY MASS INDEX: 25.59 KG/M2 | TEMPERATURE: 97.3 F | OXYGEN SATURATION: 98 % | SYSTOLIC BLOOD PRESSURE: 122 MMHG | WEIGHT: 153.6 LBS | HEART RATE: 91 BPM | RESPIRATION RATE: 18 BRPM

## 2022-10-11 DIAGNOSIS — D64.9 ANEMIA, UNSPECIFIED TYPE: ICD-10-CM

## 2022-10-11 DIAGNOSIS — D61.818 PANCYTOPENIA, ACQUIRED: Primary | ICD-10-CM

## 2022-10-11 DIAGNOSIS — D69.6 THROMBOCYTOPENIA: Primary | ICD-10-CM

## 2022-10-11 LAB
ALBUMIN SERPL-MCNC: 4.8 G/DL (ref 3.5–5.2)
ALBUMIN/GLOB SERPL: 2.7 G/DL
ALP SERPL-CCNC: 68 U/L (ref 39–117)
ALT SERPL W P-5'-P-CCNC: 12 U/L (ref 1–33)
ANION GAP SERPL CALCULATED.3IONS-SCNC: 9 MMOL/L (ref 5–15)
AST SERPL-CCNC: 17 U/L (ref 1–32)
BASOPHILS # BLD AUTO: 0.01 10*3/MM3 (ref 0–0.2)
BASOPHILS NFR BLD AUTO: 0.4 % (ref 0–1.5)
BILIRUB SERPL-MCNC: 2.2 MG/DL (ref 0–1.2)
BUN SERPL-MCNC: 8 MG/DL (ref 8–23)
BUN/CREAT SERPL: 13.1 (ref 7–25)
CALCIUM SPEC-SCNC: 9.6 MG/DL (ref 8.6–10.5)
CHLORIDE SERPL-SCNC: 107 MMOL/L (ref 98–107)
CO2 SERPL-SCNC: 26 MMOL/L (ref 22–29)
CREAT SERPL-MCNC: 0.61 MG/DL (ref 0.57–1)
DEPRECATED RDW RBC AUTO: 57.5 FL (ref 37–54)
EGFRCR SERPLBLD CKD-EPI 2021: 102.5 ML/MIN/1.73
EOSINOPHIL # BLD AUTO: 0.03 10*3/MM3 (ref 0–0.4)
EOSINOPHIL NFR BLD AUTO: 1.3 % (ref 0.3–6.2)
ERYTHROCYTE [DISTWIDTH] IN BLOOD BY AUTOMATED COUNT: 17.6 % (ref 12.3–15.4)
FERRITIN SERPL-MCNC: 315.9 NG/ML (ref 13–150)
GLOBULIN UR ELPH-MCNC: 1.8 GM/DL
GLUCOSE SERPL-MCNC: 129 MG/DL (ref 65–99)
HCT VFR BLD AUTO: 31.7 % (ref 34–46.6)
HGB BLD-MCNC: 10.1 G/DL (ref 12–15.9)
HOLD SPECIMEN: NORMAL
IMM GRANULOCYTES # BLD AUTO: 0.01 10*3/MM3 (ref 0–0.05)
IMM GRANULOCYTES NFR BLD AUTO: 0.4 % (ref 0–0.5)
IRON 24H UR-MRATE: 83 MCG/DL (ref 37–145)
IRON SATN MFR SERPL: 26 % (ref 20–50)
LDH SERPL-CCNC: 285 U/L (ref 135–214)
LYMPHOCYTES # BLD AUTO: 0.59 10*3/MM3 (ref 0.7–3.1)
LYMPHOCYTES NFR BLD AUTO: 25.8 % (ref 19.6–45.3)
MCH RBC QN AUTO: 28.7 PG (ref 26.6–33)
MCHC RBC AUTO-ENTMCNC: 31.9 G/DL (ref 31.5–35.7)
MCV RBC AUTO: 90.1 FL (ref 79–97)
MONOCYTES # BLD AUTO: 0.11 10*3/MM3 (ref 0.1–0.9)
MONOCYTES NFR BLD AUTO: 4.8 % (ref 5–12)
NEUTROPHILS NFR BLD AUTO: 1.54 10*3/MM3 (ref 1.7–7)
NEUTROPHILS NFR BLD AUTO: 67.3 % (ref 42.7–76)
NRBC BLD AUTO-RTO: 0 /100 WBC (ref 0–0.2)
PLATELET # BLD AUTO: 90 10*3/MM3 (ref 140–450)
PMV BLD AUTO: 9.6 FL (ref 6–12)
POTASSIUM SERPL-SCNC: 3.6 MMOL/L (ref 3.5–5.2)
PROT SERPL-MCNC: 6.6 G/DL (ref 6–8.5)
RBC # BLD AUTO: 3.52 10*6/MM3 (ref 3.77–5.28)
SODIUM SERPL-SCNC: 142 MMOL/L (ref 136–145)
TIBC SERPL-MCNC: 325 MCG/DL (ref 298–536)
TRANSFERRIN SERPL-MCNC: 218 MG/DL (ref 200–360)
WBC NRBC COR # BLD: 2.29 10*3/MM3 (ref 3.4–10.8)

## 2022-10-11 PROCEDURE — 84466 ASSAY OF TRANSFERRIN: CPT

## 2022-10-11 PROCEDURE — 80053 COMPREHEN METABOLIC PANEL: CPT

## 2022-10-11 PROCEDURE — 83540 ASSAY OF IRON: CPT

## 2022-10-11 PROCEDURE — 99214 OFFICE O/P EST MOD 30 MIN: CPT | Performed by: INTERNAL MEDICINE

## 2022-10-11 PROCEDURE — 36415 COLL VENOUS BLD VENIPUNCTURE: CPT

## 2022-10-11 PROCEDURE — 85025 COMPLETE CBC W/AUTO DIFF WBC: CPT

## 2022-10-11 PROCEDURE — 83615 LACTATE (LD) (LDH) ENZYME: CPT

## 2022-10-11 PROCEDURE — 82728 ASSAY OF FERRITIN: CPT

## 2022-11-22 ENCOUNTER — LAB (OUTPATIENT)
Dept: LAB | Facility: HOSPITAL | Age: 62
End: 2022-11-22

## 2022-11-22 DIAGNOSIS — D61.818 PANCYTOPENIA, ACQUIRED: ICD-10-CM

## 2022-11-22 LAB
ALBUMIN SERPL-MCNC: 4.6 G/DL (ref 3.5–5.2)
ALBUMIN/GLOB SERPL: 2.7 G/DL
ALP SERPL-CCNC: 88 U/L (ref 39–117)
ALT SERPL W P-5'-P-CCNC: 21 U/L (ref 1–33)
ANION GAP SERPL CALCULATED.3IONS-SCNC: 11 MMOL/L (ref 5–15)
AST SERPL-CCNC: 22 U/L (ref 1–32)
BASOPHILS # BLD AUTO: 0.01 10*3/MM3 (ref 0–0.2)
BASOPHILS NFR BLD AUTO: 0.4 % (ref 0–1.5)
BILIRUB SERPL-MCNC: 2.2 MG/DL (ref 0–1.2)
BUN SERPL-MCNC: 11 MG/DL (ref 8–23)
BUN/CREAT SERPL: 18.6 (ref 7–25)
CALCIUM SPEC-SCNC: 9.4 MG/DL (ref 8.6–10.5)
CHLORIDE SERPL-SCNC: 101 MMOL/L (ref 98–107)
CO2 SERPL-SCNC: 25 MMOL/L (ref 22–29)
CREAT SERPL-MCNC: 0.59 MG/DL (ref 0.57–1)
DEPRECATED RDW RBC AUTO: 51.7 FL (ref 37–54)
EGFRCR SERPLBLD CKD-EPI 2021: 103.3 ML/MIN/1.73
EOSINOPHIL # BLD AUTO: 0.03 10*3/MM3 (ref 0–0.4)
EOSINOPHIL NFR BLD AUTO: 1.2 % (ref 0.3–6.2)
ERYTHROCYTE [DISTWIDTH] IN BLOOD BY AUTOMATED COUNT: 16.1 % (ref 12.3–15.4)
GLOBULIN UR ELPH-MCNC: 1.7 GM/DL
GLUCOSE SERPL-MCNC: 162 MG/DL (ref 65–99)
HCT VFR BLD AUTO: 31.4 % (ref 34–46.6)
HGB BLD-MCNC: 10.2 G/DL (ref 12–15.9)
IMM GRANULOCYTES # BLD AUTO: 0.01 10*3/MM3 (ref 0–0.05)
IMM GRANULOCYTES NFR BLD AUTO: 0.4 % (ref 0–0.5)
LYMPHOCYTES # BLD AUTO: 0.53 10*3/MM3 (ref 0.7–3.1)
LYMPHOCYTES NFR BLD AUTO: 20.5 % (ref 19.6–45.3)
MCH RBC QN AUTO: 29 PG (ref 26.6–33)
MCHC RBC AUTO-ENTMCNC: 32.5 G/DL (ref 31.5–35.7)
MCV RBC AUTO: 89.2 FL (ref 79–97)
MONOCYTES # BLD AUTO: 0.22 10*3/MM3 (ref 0.1–0.9)
MONOCYTES NFR BLD AUTO: 8.5 % (ref 5–12)
NEUTROPHILS NFR BLD AUTO: 1.78 10*3/MM3 (ref 1.7–7)
NEUTROPHILS NFR BLD AUTO: 69 % (ref 42.7–76)
NRBC BLD AUTO-RTO: 0 /100 WBC (ref 0–0.2)
PLATELET # BLD AUTO: 95 10*3/MM3 (ref 140–450)
PMV BLD AUTO: 9.7 FL (ref 6–12)
POTASSIUM SERPL-SCNC: 4.5 MMOL/L (ref 3.5–5.2)
PROT SERPL-MCNC: 6.3 G/DL (ref 6–8.5)
RBC # BLD AUTO: 3.52 10*6/MM3 (ref 3.77–5.28)
SODIUM SERPL-SCNC: 137 MMOL/L (ref 136–145)
WBC NRBC COR # BLD: 2.58 10*3/MM3 (ref 3.4–10.8)

## 2022-11-22 PROCEDURE — 36415 COLL VENOUS BLD VENIPUNCTURE: CPT

## 2022-11-22 PROCEDURE — 80053 COMPREHEN METABOLIC PANEL: CPT

## 2022-11-22 PROCEDURE — 85025 COMPLETE CBC W/AUTO DIFF WBC: CPT

## 2023-01-05 NOTE — PROGRESS NOTES
[facility] HEMATOLOGY & ONCOLOGY [address]  Formerly [facility] [address] [phone]    Patient Name: Lucía Wolff  Encounter Date: 01/10/2023  YOB: 1960  Patient Number: [id number]      REASON FOR FOLLOW-UP: Lucía Wolff is a pleasant 62 y.o. 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  Her bone marrow showed B-cell lymphoma.  She is also seen for splenomegaly from B-cell lymphoma.  She is off treatment. She is here alone. History from the patient.   History is considered reliable.        Oncology/Hematology History Overview Note   DIAGNOSTIC ABNORMALITIES:  CBC 01/17/2022. Remarkable for platelet 97. Bilirubin 1.9.   CT neck 01/19/2022.  No cervical lymphadenopathy by size criteria.  Asymmetric appearance of the left submandibular gland. Correlation for sialoadenitis recommended. If indicated, dedicated ultrasound may be  Performed.  CT chest 01/19/2022. No intrathoracic lymphadenopathy. No axillary lymphadenopathy  CT abdomen and pelvis 01/19/2022. Massive splenomegaly.  Splenic artery aneurysm, 1.5 cm.  No additional abdominal or pelvic lymphadenopathy.  PET 01/25/2022. No hypermetabolic lymphadenopathy or tumor. No scintigraphic evidence of malignancy. . Persistent prominent splenomegaly.  Recommendations from [place] on 05/12/2022.  The palpable splenomegaly in the absence of additional findings to suspect avid adenopathy and liver disease suggested the patient has hypersplenism.  Other differential include nutritional deficiencies or cyclical neutropenia.  The enlarging spleen is not significantly PET avid therefore splenectomy would not be recommended at this time.  Other reason is infection with postsplenectomy state. Would recommend splenectomy if the patient continues to develop new symptoms for example losing another 10 pounds in 6 months, recurrent major infections,  transfusion dependent cytopenias or splenic infarcts.   JAK2 was negative, normal copper at 123.2, B12 at 748, reticulocyte 4%, negative hepatitis C antibody, uric acid 8, WBC 3.8, hemoglobin 12.4, MCV 89, platelet 116, bilirubin 2.5, , and ferritin 262 at [place].           PREVIOUS INTERVENTIONS:None.     Marginal zone lymphoma of spleen (HCC)   3/28/2022 Initial Diagnosis    Marginal zone lymphoma of spleen (HCC)         PAST MEDICAL HISTORY:  ALLERGIES:  No Known Allergies  CURRENT MEDICATIONS:  Outpatient Encounter Medications as of 1/10/2023   Medication Sig Dispense Refill   • acetaminophen (TYLENOL) 325 MG tablet Take 650 mg by mouth Every 6 (Six) Hours As Needed for Mild Pain .     • calcium carbonate (OS-MITRA) 600 MG tablet Take 600 mg by mouth 2 (Two) Times a Day With Meals.     • Cholecalciferol (vitamin D3) 125 MCG (5000 UT) capsule capsule Take 5,000 Units by mouth Daily.     • Cinnamon 500 MG capsule Every 12 (Twelve) Hours.     • cyanocobalamin 1000 MCG/ML injection INJECT 1 ML EVERY 28 DAYS. 12 mL 0   • Dextromethorphan-guaiFENesin (MUCINEX DM PO) Take 1 tablet by mouth 2 (Two) Times a Day As Needed.     • folic acid (FOLVITE) 1 MG tablet Take 1 mg by mouth Daily.     • metFORMIN ER (GLUCOPHAGE-XR) 500 MG 24 hr tablet      • Multiple Vitamins-Minerals (MULTIVITAMIN WITH MINERALS) tablet tablet Take 1 tablet by mouth Daily.     • Omega-3 300 MG capsule Take 1,200 mg by mouth Daily.     • Semaglutide,0.25 or 0.5MG/DOS, (OZEMPIC) 2 MG/1.5ML solution pen-injector Inject 0.25 mg under the skin into the appropriate area as directed 1 (One) Time Per Week.     • Syringe 25G X 1\" 3 ML misc USE 1 SYRINGE PER B12 INJECTION 12 each 0   • Thyroid 65 MG PO tablet Take 65 mg by mouth Daily.     • VIT B12-METHIONINE-INOS-CHOL IM Inject 1 mL into the appropriate muscle as directed by prescriber Every 30 (Thirty) Days. Has not taken since 2020 at least        No facility-administered encounter medications on  file as of 1/10/2023.     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   • Splenomegaly R16.1   • Pancytopenia, acquired (HCC) D61.818     SURGERIES:  Past Surgical History:   Procedure Laterality Date   • CHOLECYSTECTOMY     • HYSTERECTOMY  2003    PARTIAL   • LIVER BIOPSY  08/12/2019     HEALTH MAINTENANCE ITEMS:  Health Maintenance Due   Topic Date Due   • Pneumococcal Vaccine 0-64 (1 - PCV) Never done   • TDAP/TD VACCINES (1 - Tdap) Never done   • ZOSTER VACCINE (1 of 2) Never done   • ANNUAL PHYSICAL  Never done   • PAP SMEAR  Never done   • MAMMOGRAM  11/01/2020   • COVID-19 Vaccine (5 - Booster for Pfizer series) 09/30/2022       <no information>  Last Completed Colonoscopy          COLORECTAL CANCER SCREENING (COLONOSCOPY - Every 10 Years) Next due on 10/25/2032    10/25/2022  Outside Procedure: [place] COLONOSCOPY FLX DX W/COLLJ SPEC WHEN PFRMD              Immunization History   Administered Date(s) Administered   • COVID-19 (PFIZER) PURPLE CAP 03/26/2021, 04/16/2021, 11/29/2021   • Covid-19 (Pfizer) Gray Cap 08/05/2022     Last Completed Mammogram     This patient has no relevant Health Maintenance data.            FAMILY HISTORY:  Family History   Problem Relation Age of Onset   • Alzheimer's disease Mother    • Diabetes Father    • Stroke Father    • Cardiomyopathy Father    • Heart attack Father    • Heart disease Father    • Hypertension Father    • Cancer Brother         She is unclear of the kind of cancer   • Diabetes Brother    • Diabetes Brother    • Hypertension Brother    • No Known Problems Brother    • No Known Problems Son    • No Known Problems Son    • No Known Problems Daughter      SOCIAL HISTORY:  Social History     Socioeconomic History   • Marital status:    Tobacco Use   • Smoking status: Never   • Smokeless tobacco: Never   • Tobacco comments:     has tried  it but did not keep smoking   Substance and Sexual Activity   • Alcohol use: Never   • Drug use: No   • Sexual activity: Defer       REVIEW OF SYSTEMS:    Review of Systems   Constitutional: Positive for fatigue. Negative for fever and unexpected weight change.        \"I feel tired.\"   HENT: Negative for congestion, mouth sores and trouble swallowing.    Eyes: Negative for redness and visual disturbance.   Respiratory: Negative for cough, shortness of breath and wheezing.    Cardiovascular: Negative for chest pain and palpitations.   Gastrointestinal: Negative for abdominal pain, nausea and vomiting.   Endocrine: Negative for polydipsia and polyphagia.   Genitourinary: Negative for difficulty urinating, dysuria and flank pain.   Musculoskeletal: Negative for gait problem and myalgias.   Skin: Negative for pallor.   Allergic/Immunologic: Negative for food allergies.   Neurological: Negative for dizziness, speech difficulty and weakness.   Hematological: Negative for adenopathy. Does not bruise/bleed easily.   Psychiatric/Behavioral: Negative for agitation, confusion and hallucinations.       VITAL SIGNS: /58   Pulse 90   Temp 98.3 °F (36.8 °C)   Resp 18   Ht 165.1 cm (65\")   Wt 63.6 kg (140 lb 4.8 oz)   SpO2 97%   Breastfeeding No   BMI 23.35 kg/m²  Lost 13 pounds. \"I will try to eat more.\"  Pain Score    01/10/23 1409   PainSc: 0-No pain       PHYSICAL EXAMINATION:     Physical Exam  Vitals reviewed.   Constitutional:       General: She is not in acute distress.  HENT:      Head: Normocephalic and atraumatic.   Eyes:      General: No scleral icterus.  Cardiovascular:      Rate and Rhythm: Normal rate.   Pulmonary:      Effort: No respiratory distress.      Breath sounds: No wheezing or rales.   Abdominal:      General: Bowel sounds are normal.      Palpations: Abdomen is soft.      Tenderness: There is no abdominal tenderness.      Comments: Spleen tip is palpable.   Musculoskeletal:         General:  No swelling.      Cervical back: Neck supple.   Skin:     General: Skin is warm.      Coloration: Skin is not pale.   Neurological:      Mental Status: She is alert and oriented to person, place, and time.   Psychiatric:         Mood and Affect: Mood normal.         Behavior: Behavior normal.         Thought Content: Thought content normal.         Judgment: Judgment normal.         LABS    Lab Results - Last 18 Months   Lab Units 01/10/23  1344 11/22/22  0937 10/11/22  1258 08/03/22  1036 06/22/22  1108 05/05/22  1024 04/07/22  0820 03/01/22  1033   HEMOGLOBIN g/dL 12.7 10.2* 10.1* 8.6* 9.9* 10.7* 11.2* 10.6*   HEMATOCRIT % 38.0 31.4* 31.7* 27.4* 29.6* 33.6* 33.8* 31.7*   MCV fL 86.6 89.2 90.1 90.4 88.4 89.6 89.7 87.3   WBC 10*3/mm3 4.07 2.58* 2.29* 1.88* 2.45* 2.64* 2.24* 2.33*   RDW % 16.2* 16.1* 17.6* 17.3* 18.3* 17.1* 17.0* 15.9*   MPV fL 10.3 9.7 9.6 9.9 10.6 10.0 11.1 10.3   PLATELETS 10*3/mm3 112* 95* 90* 73* 88* 85* 74* 76*   IMM GRAN % %  --  0.4 0.4 0.5  --   --   --  0.4   NEUTROS ABS 10*3/mm3 2.77 1.78 1.54* 1.16* 1.80 1.93 1.54* 1.68*   LYMPHS ABS 10*3/mm3 1.02 0.53* 0.59* 0.51*  --  0.50* 0.48* 0.43*   MONOS ABS 10*3/mm3 0.22 0.22 0.11 0.16  --  0.16 0.17 0.19   EOS ABS 10*3/mm3 0.03 0.03 0.03 0.03 0.02 0.03 0.03 0.02   BASOS ABS 10*3/mm3 0.01 0.01 0.01 0.01  --  0.01 0.01 0.00   IMMATURE GRANS (ABS) 10*3/mm3  --  0.01 0.01 0.01  --   --   --  0.01   NRBC /100 WBC  --  0.0 0.0 0.0  --   --   --  0.0   NEUTROPHIL % %  --   --   --   --  70.4  --   --   --    MONOCYTES % %  --   --   --   --  3.1*  --   --   --    ATYP LYMPH % %  --   --   --   --  3.1  --   --   --    ANISOCYTOSIS   --   --   --   --  Mod/2+  --   --   --    GIANT PLT   --   --   --   --  Slight/1+  --   --   --        Lab Results - Last 18 Months   Lab Units 01/10/23  1344 11/22/22  0937 10/11/22  1258 06/22/22  1108 05/05/22  1024 04/07/22  0820 01/19/22  0944 01/17/22  1246   GLUCOSE mg/dL 124* 162* 129* 242* 216* 208*  --  208*    SODIUM mmol/L 141 137 142 138 142 141  --  140   POTASSIUM mmol/L 4.2 4.5 3.6 4.3 4.4 4.1  --  4.3   CO2 mmol/L 26.0 25.0 26.0 25.0 26.0 25.0  --  23.0   CHLORIDE mmol/L 103 101 107 102 106 106  --  103   ANION GAP mmol/L 12.0 11.0 9.0 11.0 10.0 10.0  --  14.0   CREATININE mg/dL 0.58 0.59 0.61 0.53* 0.54* 0.52*   < > 0.53*   BUN mg/dL 14 11 8 10 7 15  --  12   BUN / CREAT RATIO  24.1 18.6 13.1 18.9 13.0 28.8*  --  22.6   CALCIUM mg/dL 9.4 9.4 9.6 9.2 9.1 9.4  --  9.2   EGFR IF NONAFRICN AM mL/min/1.73  --   --   --   --   --   --   --  118   ALK PHOS U/L 77 88 68 85 82 78  --  94   TOTAL PROTEIN g/dL 6.8 6.3 6.6 6.4 6.0 6.1  --  6.7   ALT (SGPT) U/L 23 21 12 14 19 27  --  27   AST (SGOT) U/L 20 22 17 20 17 22  --  17   BILIRUBIN mg/dL 1.8* 2.2* 2.2* 1.9* 1.8* 1.6*  --  1.9*   ALBUMIN g/dL 4.9 4.60 4.80 4.80 4.60 4.70  --  4.90   GLOBULIN gm/dL 1.9 1.7 1.8 1.6 1.4 1.4  --  1.8    < > = values in this interval not displayed.       Lab Results - Last 18 Months   Lab Units 01/10/23  1344 10/11/22  1258 06/22/22  1108 05/12/22  1029 05/05/22  1024 04/07/22  0820   URIC ACID mg/dL  --   --   --  8.0*  --   --    LDH U/L 187 285* 379* 285* 278* 242*   REFERENCE LAB REPORT   --   --   --   --   --  See attached report  See Attached Result  See Attached Report       Lab Results - Last 18 Months   Lab Units 01/10/23  1344 10/11/22  1258 05/12/22  1029 01/17/22  1246   IRON mcg/dL 71 83  --  89   TIBC mcg/dL 358 325  --  344   IRON SATURATION % 20 26  --  26   FERRITIN ng/mL 366.50* 315.90* 262* 380.20*   TSH uIU/mL  --   --   --  1.230   FOLATE ng/mL  --   --   --  10.50       Lucía Wolff reports a pain score of 0. .      ASSESSMENT:  1.  Pancytopenia, from hypersplenism.  2.  2% monoclonal B-cell population on previous bone marrow biopsy 10/31/2019.  Repeat bone marrow biopsy 03/01/2022.  Less than 1% small monoclonal B-cell population with a nonspecific immune phenotype.  Normal cytogenetics.  Variably  hypercellular marrow with maturing trilineage hematopoiesis, erythroid hyperplasia and mild interstitial nodule small lymphocytic infiltrate, 5 to 10% adequate iron storage.  No evidence of overt myelodysplasia or plasma cell neoplasm.  Negative JAK2 and BCR ABL.  Negative Bcl-2, BCL6, 7 q. and MYB 88.  Stable for observation.  3.  Splenomegaly 18 cm on 01/19/2022.  Negative PET on 01/25/2022.  No adenopathies on CT of the chest, abdomen and pelvis on 01/19/2022.  4.  Anemia of chronic disease. Observation.  5.  Weight loss secondary to early satiety and hypersplenism.         PLAN:  1.   Re: Interval bilirubin 2.2, WBC 2.5,  ANC 1.78, hemoglobin 10.2, hematocrit 31.4, MCV 89.2 and platelet 95 on 11/22/2022.  2.   Re: Heme status. WBC 4.07, ANC 2.77, hemoglobin 12.7. hematocrit 38, and platelet 112 from 90 from 73. Ferritin 366.5 from 315.9and iron saturation 20%.  3.   Re: CMP. Bilirubin 1.8 from 2.2 from 2.2 from 1.9. Observe closely.  4.   Re: LDH. 187 from 285 from 379.  5.  Order CT chest, abdomen and pelvis due to weight loss in setting of lymphoma.  6.  CBC with differential and CMP in 6 weeks.  7.  Continue care per primary care physician and other specialist.  8.  Plan of care discussed with patient.  Understanding expressed.  Patient agreeable to proceed.  9.  Plan for splenectomy if the patient have develop new symptom like losing 10 pounds over 6 months, recurrent major infections, transfusion dependent cytopenias or development of splenic infarct.  10.  Return to office in 3 months with CBC with differential, CMP, and LDH, same day.       I have reviewed the assessment and plan and verified the accuracy of it. No changes to assessment and plan since the information was documented. Benigno Hastings MD 01/10/23        I spent 31 total minutes, face-to-face, caring for Lucía hernandez.  Greater than 50% of this time involved counseling and/or coordination of care as documented within  this note regarding the patient's illness(es), pros and cons of various treatment options, instructions and/or risk reduction.           ([place] Pena MD [place])  MD Maylin Abernathy APRN

## 2023-01-10 ENCOUNTER — OFFICE VISIT (OUTPATIENT)
Dept: ONCOLOGY | Facility: CLINIC | Age: 63
End: 2023-01-10
Payer: COMMERCIAL

## 2023-01-10 ENCOUNTER — LAB (OUTPATIENT)
Dept: LAB | Facility: HOSPITAL | Age: 63
End: 2023-01-10
Payer: COMMERCIAL

## 2023-01-10 VITALS
BODY MASS INDEX: 23.38 KG/M2 | HEIGHT: 65 IN | OXYGEN SATURATION: 97 % | RESPIRATION RATE: 18 BRPM | TEMPERATURE: 98.3 F | SYSTOLIC BLOOD PRESSURE: 112 MMHG | HEART RATE: 90 BPM | DIASTOLIC BLOOD PRESSURE: 58 MMHG | WEIGHT: 140.3 LBS

## 2023-01-10 DIAGNOSIS — D61.818 PANCYTOPENIA, ACQUIRED: Primary | ICD-10-CM

## 2023-01-10 DIAGNOSIS — C83.07 MARGINAL ZONE LYMPHOMA OF SPLEEN: ICD-10-CM

## 2023-01-10 LAB
ALBUMIN SERPL-MCNC: 4.9 G/DL (ref 3.5–5.2)
ALBUMIN/GLOB SERPL: 2.6 G/DL
ALP SERPL-CCNC: 77 U/L (ref 39–117)
ALT SERPL W P-5'-P-CCNC: 23 U/L (ref 1–33)
ANION GAP SERPL CALCULATED.3IONS-SCNC: 12 MMOL/L (ref 5–15)
AST SERPL-CCNC: 20 U/L (ref 1–32)
BASOPHILS # BLD AUTO: 0.01 10*3/MM3 (ref 0–0.2)
BASOPHILS NFR BLD AUTO: 0.2 % (ref 0–1.5)
BILIRUB SERPL-MCNC: 1.8 MG/DL (ref 0–1.2)
BUN SERPL-MCNC: 14 MG/DL (ref 8–23)
BUN/CREAT SERPL: 24.1 (ref 7–25)
CALCIUM SPEC-SCNC: 9.4 MG/DL (ref 8.6–10.5)
CHLORIDE SERPL-SCNC: 103 MMOL/L (ref 98–107)
CO2 SERPL-SCNC: 26 MMOL/L (ref 22–29)
CREAT SERPL-MCNC: 0.58 MG/DL (ref 0.57–1)
DEPRECATED RDW RBC AUTO: 50.9 FL (ref 37–54)
EGFRCR SERPLBLD CKD-EPI 2021: 103.7 ML/MIN/1.73
EOSINOPHIL # BLD AUTO: 0.03 10*3/MM3 (ref 0–0.4)
EOSINOPHIL NFR BLD AUTO: 0.7 % (ref 0.3–6.2)
ERYTHROCYTE [DISTWIDTH] IN BLOOD BY AUTOMATED COUNT: 16.2 % (ref 12.3–15.4)
FERRITIN SERPL-MCNC: 366.5 NG/ML (ref 13–150)
GLOBULIN UR ELPH-MCNC: 1.9 GM/DL
GLUCOSE SERPL-MCNC: 124 MG/DL (ref 65–99)
HCT VFR BLD AUTO: 38 % (ref 34–46.6)
HGB BLD-MCNC: 12.7 G/DL (ref 12–15.9)
HOLD SPECIMEN: NORMAL
IRON 24H UR-MRATE: 71 MCG/DL (ref 37–145)
IRON SATN MFR SERPL: 20 % (ref 20–50)
LDH SERPL-CCNC: 187 U/L (ref 135–214)
LYMPHOCYTES # BLD AUTO: 1.02 10*3/MM3 (ref 0.7–3.1)
LYMPHOCYTES NFR BLD AUTO: 25.1 % (ref 19.6–45.3)
MCH RBC QN AUTO: 28.9 PG (ref 26.6–33)
MCHC RBC AUTO-ENTMCNC: 33.4 G/DL (ref 31.5–35.7)
MCV RBC AUTO: 86.6 FL (ref 79–97)
MONOCYTES # BLD AUTO: 0.22 10*3/MM3 (ref 0.1–0.9)
MONOCYTES NFR BLD AUTO: 5.4 % (ref 5–12)
NEUTROPHILS NFR BLD AUTO: 2.77 10*3/MM3 (ref 1.7–7)
NEUTROPHILS NFR BLD AUTO: 68.1 % (ref 42.7–76)
PLATELET # BLD AUTO: 112 10*3/MM3 (ref 140–450)
PMV BLD AUTO: 10.3 FL (ref 6–12)
POTASSIUM SERPL-SCNC: 4.2 MMOL/L (ref 3.5–5.2)
PROT SERPL-MCNC: 6.8 G/DL (ref 6–8.5)
RBC # BLD AUTO: 4.39 10*6/MM3 (ref 3.77–5.28)
SODIUM SERPL-SCNC: 141 MMOL/L (ref 136–145)
TIBC SERPL-MCNC: 358 MCG/DL (ref 298–536)
TRANSFERRIN SERPL-MCNC: 240 MG/DL (ref 200–360)
WBC NRBC COR # BLD: 4.07 10*3/MM3 (ref 3.4–10.8)

## 2023-01-10 PROCEDURE — 80053 COMPREHEN METABOLIC PANEL: CPT

## 2023-01-10 PROCEDURE — 83540 ASSAY OF IRON: CPT

## 2023-01-10 PROCEDURE — 85025 COMPLETE CBC W/AUTO DIFF WBC: CPT

## 2023-01-10 PROCEDURE — 99214 OFFICE O/P EST MOD 30 MIN: CPT | Performed by: INTERNAL MEDICINE

## 2023-01-10 PROCEDURE — 82728 ASSAY OF FERRITIN: CPT

## 2023-01-10 PROCEDURE — 84466 ASSAY OF TRANSFERRIN: CPT

## 2023-01-10 PROCEDURE — 83615 LACTATE (LD) (LDH) ENZYME: CPT

## 2023-01-10 PROCEDURE — 36415 COLL VENOUS BLD VENIPUNCTURE: CPT

## 2023-01-18 ENCOUNTER — HOSPITAL ENCOUNTER (OUTPATIENT)
Dept: CT IMAGING | Facility: HOSPITAL | Age: 63
Discharge: HOME | End: 2023-01-18
Admitting: INTERNAL MEDICINE
Payer: COMMERCIAL

## 2023-01-18 DIAGNOSIS — D61.818 PANCYTOPENIA, ACQUIRED: ICD-10-CM

## 2023-01-18 DIAGNOSIS — C83.07 MARGINAL ZONE LYMPHOMA OF SPLEEN: ICD-10-CM

## 2023-01-18 LAB — CREAT BLDA-MCNC: 0.7 MG/DL (ref 0.6–1.3)

## 2023-01-18 PROCEDURE — 74177 CT ABD & PELVIS W/CONTRAST: CPT

## 2023-01-18 PROCEDURE — 71260 CT THORAX DX C+: CPT

## 2023-01-18 PROCEDURE — 82565 ASSAY OF CREATININE: CPT

## 2023-01-18 PROCEDURE — 25010000002 IOPAMIDOL 61 % SOLUTION: Performed by: INTERNAL MEDICINE

## 2023-01-18 RX ADMIN — IOPAMIDOL 100 ML: 612 INJECTION, SOLUTION INTRAVENOUS at 14:16

## 2023-01-19 ENCOUNTER — TELEPHONE (OUTPATIENT)
Dept: ONCOLOGY | Facility: CLINIC | Age: 63
End: 2023-01-19
Payer: COMMERCIAL

## 2023-01-19 NOTE — TELEPHONE ENCOUNTER
----- Message from Benigno Hastings MD sent at 1/18/2023  2:59 PM CST -----  Notify patient.  Unchanged splenomegaly.      Marked splenomegaly as seen on the CT from January, 2022, essentially  unchanged. There is dilation and tortuosity of the splenic vein. There  is a small stable 1.3 cm splenic artery aneurysm without evidence of  rupture. No intra-abdominal or pelvic lymphadenopathy is identified.  2. Evidence of previous cholecystectomy and hysterectomy.  3. A few diverticula are seen in the sigmoid colon without evidence of  acute diverticulitis.

## 2023-02-27 ENCOUNTER — TELEPHONE (OUTPATIENT)
Dept: ONCOLOGY | Facility: CLINIC | Age: 63
End: 2023-02-27
Payer: COMMERCIAL

## 2023-02-27 NOTE — TELEPHONE ENCOUNTER
Caller: Lucía Wolff    Relationship: Self    Best call back number: [phone]      What was the call regarding: PT CALLED TO CONFIRM THAT DR SOLIZ WANTS HER TO HAVE LABS DONE EVERY 6 WEEKS.  ALSO PATIENT WANTED TO KNOW IF SHE HAS AN ORDER FROM ANOTHER DR FOR LABS CAN WE DO THOSE AS WELL     Do you require a callback: YES

## 2023-02-27 NOTE — TELEPHONE ENCOUNTER
Patient wasn't scheduled for labs in 6 weeks. Its a little over 6 weeks but she can come to get them done tomorrow 2/28/2023. So we got those scheduled for her. And patient has labs from another doctor. I told her as long as she had the orders they could get the blood for that doctor as well. Patient verbalized understanding.

## 2023-02-28 ENCOUNTER — LAB (OUTPATIENT)
Dept: LAB | Facility: HOSPITAL | Age: 63
End: 2023-02-28
Payer: COMMERCIAL

## 2023-02-28 ENCOUNTER — TRANSCRIBE ORDERS (OUTPATIENT)
Dept: ADMINISTRATIVE | Facility: HOSPITAL | Age: 63
End: 2023-02-28
Payer: COMMERCIAL

## 2023-02-28 ENCOUNTER — TELEPHONE (OUTPATIENT)
Dept: ONCOLOGY | Facility: CLINIC | Age: 63
End: 2023-02-28
Payer: COMMERCIAL

## 2023-02-28 DIAGNOSIS — E83.119 HEMOCHROMATOSIS, UNSPECIFIED HEMOCHROMATOSIS TYPE: ICD-10-CM

## 2023-02-28 DIAGNOSIS — C83.07 MARGINAL ZONE LYMPHOMA OF SPLEEN: ICD-10-CM

## 2023-02-28 DIAGNOSIS — E83.119 HEMOCHROMATOSIS, UNSPECIFIED HEMOCHROMATOSIS TYPE: Primary | ICD-10-CM

## 2023-02-28 DIAGNOSIS — K75.81 NONALCOHOLIC STEATOHEPATITIS (NASH): Primary | ICD-10-CM

## 2023-02-28 DIAGNOSIS — D61.818 PANCYTOPENIA, ACQUIRED: ICD-10-CM

## 2023-02-28 DIAGNOSIS — K74.60 HEPATIC CIRRHOSIS, UNSPECIFIED HEPATIC CIRRHOSIS TYPE, UNSPECIFIED WHETHER ASCITES PRESENT: ICD-10-CM

## 2023-02-28 LAB
ALBUMIN SERPL-MCNC: 4.3 G/DL (ref 3.5–5.2)
ALBUMIN/GLOB SERPL: 2.5 G/DL
ALP SERPL-CCNC: 84 U/L (ref 39–117)
ALT SERPL W P-5'-P-CCNC: 21 U/L (ref 1–33)
ANION GAP SERPL CALCULATED.3IONS-SCNC: 9 MMOL/L (ref 5–15)
AST SERPL-CCNC: 28 U/L (ref 1–32)
BASOPHILS # BLD AUTO: 0.01 10*3/MM3 (ref 0–0.2)
BASOPHILS NFR BLD AUTO: 0.6 % (ref 0–1.5)
BILIRUB SERPL-MCNC: 1.4 MG/DL (ref 0–1.2)
BUN SERPL-MCNC: 10 MG/DL (ref 8–23)
BUN/CREAT SERPL: 21.3 (ref 7–25)
CALCIUM SPEC-SCNC: 9.3 MG/DL (ref 8.6–10.5)
CHLORIDE SERPL-SCNC: 106 MMOL/L (ref 98–107)
CO2 SERPL-SCNC: 27 MMOL/L (ref 22–29)
CREAT SERPL-MCNC: 0.47 MG/DL (ref 0.57–1)
DEPRECATED RDW RBC AUTO: 60.7 FL (ref 37–54)
EGFRCR SERPLBLD CKD-EPI 2021: 109.1 ML/MIN/1.73
EOSINOPHIL # BLD AUTO: 0.02 10*3/MM3 (ref 0–0.4)
EOSINOPHIL NFR BLD AUTO: 1.2 % (ref 0.3–6.2)
ERYTHROCYTE [DISTWIDTH] IN BLOOD BY AUTOMATED COUNT: 18.6 % (ref 12.3–15.4)
GLOBULIN UR ELPH-MCNC: 1.7 GM/DL
GLUCOSE SERPL-MCNC: 153 MG/DL (ref 65–99)
HCT VFR BLD AUTO: 29.2 % (ref 34–46.6)
HGB BLD-MCNC: 9.2 G/DL (ref 12–15.9)
IMM GRANULOCYTES # BLD AUTO: 0.02 10*3/MM3 (ref 0–0.05)
IMM GRANULOCYTES NFR BLD AUTO: 1.2 % (ref 0–0.5)
LYMPHOCYTES # BLD AUTO: 0.36 10*3/MM3 (ref 0.7–3.1)
LYMPHOCYTES NFR BLD AUTO: 20.8 % (ref 19.6–45.3)
MCH RBC QN AUTO: 28.8 PG (ref 26.6–33)
MCHC RBC AUTO-ENTMCNC: 31.5 G/DL (ref 31.5–35.7)
MCV RBC AUTO: 91.3 FL (ref 79–97)
MONOCYTES # BLD AUTO: 0.12 10*3/MM3 (ref 0.1–0.9)
MONOCYTES NFR BLD AUTO: 6.9 % (ref 5–12)
NEUTROPHILS NFR BLD AUTO: 1.2 10*3/MM3 (ref 1.7–7)
NEUTROPHILS NFR BLD AUTO: 69.3 % (ref 42.7–76)
NRBC BLD AUTO-RTO: 0 /100 WBC (ref 0–0.2)
PLATELET # BLD AUTO: 76 10*3/MM3 (ref 140–450)
PMV BLD AUTO: 10.3 FL (ref 6–12)
POTASSIUM SERPL-SCNC: 3.6 MMOL/L (ref 3.5–5.2)
PROT SERPL-MCNC: 6 G/DL (ref 6–8.5)
RBC # BLD AUTO: 3.2 10*6/MM3 (ref 3.77–5.28)
SODIUM SERPL-SCNC: 142 MMOL/L (ref 136–145)
WBC NRBC COR # BLD: 1.73 10*3/MM3 (ref 3.4–10.8)

## 2023-02-28 PROCEDURE — 81256 HFE GENE: CPT

## 2023-02-28 PROCEDURE — 36415 COLL VENOUS BLD VENIPUNCTURE: CPT

## 2023-02-28 PROCEDURE — 80053 COMPREHEN METABOLIC PANEL: CPT

## 2023-02-28 PROCEDURE — 85025 COMPLETE CBC W/AUTO DIFF WBC: CPT

## 2023-02-28 NOTE — TELEPHONE ENCOUNTER
Patient is being monitored by Dr Hastings for dx of   Pancytopenia as well as B-Cell Lymphoma most recent f/u visit 1/10/23 plan of care in this note per Dr Hastings

## 2023-02-28 NOTE — TELEPHONE ENCOUNTER
CRITICAL LAB VALUE  Received call from [facility] Hematology with CRITICAL LAB VALUE OF:    WBC: 1.73  This information was sent to Dr Hastings/Aysha Strange covering for Dr Hastings for review.

## 2023-03-06 LAB — HFE GENE MUT ANL BLD/T: NORMAL

## 2023-03-08 DIAGNOSIS — E83.119 HEMOCHROMATOSIS, UNSPECIFIED HEMOCHROMATOSIS TYPE: Primary | ICD-10-CM

## 2023-03-28 NOTE — PROGRESS NOTES
[facility] ONC [facility] HEMATOLOGY & ONCOLOGY [address]  [facility] [address] [phone]    Patient Name: Lucía Wolff  Encounter Date: 04/11/2023  YOB: 1960  Patient Number: [id number]      REASON FOR FOLLOW-UP: Lucía Wolff is a pleasant 62 y.o. 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  Her bone marrow showed B-cell lymphoma. She is also seen for splenomegaly from B-cell lymphoma.  She is on observation. She is seen alone. History from the patient.  She is a reliable historian.          Oncology/Hematology History Overview Note   DIAGNOSTIC ABNORMALITIES:  CBC 01/17/2022. Remarkable for platelet 97. Bilirubin 1.9.   CT neck 01/19/2022.  No cervical lymphadenopathy by size criteria.  Asymmetric appearance of the left submandibular gland. Correlation for sialoadenitis recommended. If indicated, dedicated ultrasound may be  Performed.  CT chest 01/19/2022. No intrathoracic lymphadenopathy. No axillary lymphadenopathy  CT abdomen and pelvis 01/19/2022. Massive splenomegaly.  Splenic artery aneurysm, 1.5 cm.  No additional abdominal or pelvic lymphadenopathy.  PET 01/25/2022. No hypermetabolic lymphadenopathy or tumor. No scintigraphic evidence of malignancy. . Persistent prominent splenomegaly.  Recommendations from [place] on 05/12/2022.  The palpable splenomegaly in the absence of additional findings to suspect avid adenopathy and liver disease suggested the patient has hypersplenism.  Other differential include nutritional deficiencies or cyclical neutropenia.  The enlarging spleen is not significantly PET avid therefore splenectomy would not be recommended at this time.  Other reason is infection with postsplenectomy state. Would recommend splenectomy if the patient continues to develop new symptoms for example losing another 10 pounds in 6 months, recurrent major infections,  "transfusion dependent cytopenias or splenic infarcts.   JAK2 was negative, normal copper at 123.2, B12 at 748, reticulocyte 4%, negative hepatitis C antibody, uric acid 8, WBC 3.8, hemoglobin 12.4, MCV 89, platelet 116, bilirubin 2.5, , and ferritin 262 at [place].           PREVIOUS INTERVENTIONS:None.     Marginal zone lymphoma of spleen   3/28/2022 Initial Diagnosis    Marginal zone lymphoma of spleen (HCC)         PAST MEDICAL HISTORY:  ALLERGIES:  No Known Allergies  CURRENT MEDICATIONS:  Outpatient Encounter Medications as of 4/11/2023   Medication Sig Dispense Refill   • acetaminophen (TYLENOL) 325 MG tablet Take 2 tablets by mouth Every 6 (Six) Hours As Needed for Mild Pain.     • calcium carbonate (OS-MITRA) 600 MG tablet Take 1 tablet by mouth 2 (Two) Times a Day With Meals.     • Cholecalciferol (vitamin D3) 125 MCG (5000 UT) capsule capsule Take 1 capsule by mouth Daily.     • Cinnamon 500 MG capsule Every 12 (Twelve) Hours.     • cyanocobalamin 1000 MCG/ML injection INJECT 1 ML EVERY 28 DAYS. 12 mL 0   • Dextromethorphan-guaiFENesin (MUCINEX DM PO) Take 1 tablet by mouth 2 (Two) Times a Day As Needed.     • folic acid (FOLVITE) 1 MG tablet Take 1 tablet by mouth Daily.     • metFORMIN ER (GLUCOPHAGE-XR) 500 MG 24 hr tablet      • Multiple Vitamins-Minerals (MULTIVITAMIN WITH MINERALS) tablet tablet Take 1 tablet by mouth Daily.     • Omega-3 300 MG capsule Take 4 capsules by mouth Daily.     • Semaglutide,0.25 or 0.5MG/DOS, (OZEMPIC) 2 MG/1.5ML solution pen-injector Inject 0.25 mg under the skin into the appropriate area as directed 1 (One) Time Per Week.     • Syringe 25G X 1\" 3 ML misc USE 1 SYRINGE PER B12 INJECTION 12 each 0   • Thyroid 65 MG PO tablet Take 1 tablet by mouth Daily.     • VIT B12-METHIONINE-INOS-CHOL IM Inject 1 mL into the appropriate muscle as directed by prescriber Every 30 (Thirty) Days. Has not taken since 2020 at least        No facility-administered encounter " medications on file as of 4/11/2023.     ADULT ILLNESSES:  Patient Active Problem List   Diagnosis Code   • Heart murmur R01.1   • Thrombocytopenia D69.6   • Marginal zone lymphoma of spleen C83.07   • Splenomegaly R16.1   • Pancytopenia, acquired D61.818     SURGERIES:  Past Surgical History:   Procedure Laterality Date   • CHOLECYSTECTOMY     • HYSTERECTOMY  2003    PARTIAL   • LIVER BIOPSY  08/12/2019     HEALTH MAINTENANCE ITEMS:  Health Maintenance Due   Topic Date Due   • URINE MICROALBUMIN  Never done   • Pneumococcal Vaccine 0-64 (1 - PCV) Never done   • TDAP/TD VACCINES (1 - Tdap) Never done   • ZOSTER VACCINE (1 of 2) Never done   • ANNUAL PHYSICAL  Never done   • DIABETIC FOOT EXAM  Never done   • PAP SMEAR  Never done   • HEMOGLOBIN A1C  Never done   • DIABETIC EYE EXAM  Never done   • MAMMOGRAM  11/01/2020   • Hepatitis B (1 of 3 - Risk 3-dose series) Never done   • COVID-19 Vaccine (5 - Booster for Pfizer series) 09/30/2022       <no information>  Last Completed Colonoscopy          COLORECTAL CANCER SCREENING (COLONOSCOPY - Every 10 Years) Next due on 10/25/2032    10/25/2022  Outside Procedure: ND COLONOSCOPY FLX DX W/COLLJ SPEC WHEN PFRMD              Immunization History   Administered Date(s) Administered   • COVID-19 (PFIZER) PURPLE CAP 03/26/2021, 04/16/2021, 11/29/2021   • Covid-19 (Pfizer) Gray Cap 08/05/2022     Last Completed Mammogram     This patient has no relevant Health Maintenance data.            FAMILY HISTORY:  Family History   Problem Relation Age of Onset   • Alzheimer's disease Mother    • Diabetes Father    • Stroke Father    • Cardiomyopathy Father    • Heart attack Father    • Heart disease Father    • Hypertension Father    • Cancer Brother         She is unclear of the kind of cancer   • Diabetes Brother    • Diabetes Brother    • Hypertension Brother    • No Known Problems Brother    • No Known Problems Son    • No Known Problems Son    • No Known Problems Daughter   "    SOCIAL HISTORY:  Social History     Socioeconomic History   • Marital status:    Tobacco Use   • Smoking status: Never   • Smokeless tobacco: Never   • Tobacco comments:     has tried it but did not keep smoking   Substance and Sexual Activity   • Alcohol use: Never   • Drug use: No   • Sexual activity: Defer       REVIEW OF SYSTEMS:    Review of Systems   Constitutional: Positive for fatigue. Negative for fever and unexpected weight change.        \"I tire easy.\"   HENT: Negative for congestion, mouth sores and trouble swallowing.    Eyes: Negative for redness and visual disturbance.   Respiratory: Negative for cough, shortness of breath and wheezing.    Cardiovascular: Negative for chest pain and palpitations.   Gastrointestinal: Negative for abdominal pain, constipation, diarrhea, nausea and vomiting.   Endocrine: Negative for polydipsia and polyphagia.   Genitourinary: Negative for dysuria, flank pain and hematuria.   Musculoskeletal: Negative for gait problem and neck stiffness.   Skin: Positive for pallor.   Allergic/Immunologic: Negative for food allergies.   Neurological: Negative for dizziness, speech difficulty and weakness.   Hematological: Negative for adenopathy. Does not bruise/bleed easily.   Psychiatric/Behavioral: Negative for agitation, confusion and hallucinations.       VITAL SIGNS: /58   Pulse 93   Temp 98.5 °F (36.9 °C)   Resp 18   Ht 165.1 cm (65\")   Wt 65.9 kg (145 lb 3.2 oz)   SpO2 99%   Breastfeeding No   BMI 24.16 kg/m²  Gained 5 pounds.   Pain Score    04/11/23 1409   PainSc: 0-No pain       PHYSICAL EXAMINATION:     Physical Exam  Vitals reviewed.   Constitutional:       General: She is not in acute distress.  HENT:      Head: Normocephalic and atraumatic.   Eyes:      General: No scleral icterus.  Cardiovascular:      Rate and Rhythm: Normal rate.   Pulmonary:      Effort: No respiratory distress.      Breath sounds: No wheezing.   Abdominal:      General: Bowel " sounds are normal.      Palpations: Abdomen is soft.      Tenderness: There is no abdominal tenderness.      Comments: Palpable spleen.   Musculoskeletal:         General: No swelling.      Cervical back: Neck supple.   Skin:     General: Skin is warm.      Coloration: Skin is pale.   Neurological:      Mental Status: She is alert and oriented to person, place, and time.   Psychiatric:         Mood and Affect: Mood normal.         Behavior: Behavior normal.         Thought Content: Thought content normal.         Judgment: Judgment normal.         LABS    Lab Results - Last 18 Months   Lab Units 04/11/23  1348 02/28/23  0852 01/10/23  1344 11/22/22  0937 10/11/22  1258 08/03/22  1036 06/22/22  1108 05/05/22  1024 04/07/22  0820 03/01/22  1033   HEMOGLOBIN g/dL 7.2* 9.2* 12.7 10.2* 10.1* 8.6* 9.9* 10.7*   < > 10.6*   HEMATOCRIT % 23.8* 29.2* 38.0 31.4* 31.7* 27.4* 29.6* 33.6*   < > 31.7*   MCV fL 91.9 91.3 86.6 89.2 90.1 90.4 88.4 89.6   < > 87.3   WBC 10*3/mm3 1.17* 1.73* 4.07 2.58* 2.29* 1.88* 2.45* 2.64*   < > 2.33*   RDW % 19.2* 18.6* 16.2* 16.1* 17.6* 17.3* 18.3* 17.1*   < > 15.9*   MPV fL 9.7 10.3 10.3 9.7 9.6 9.9 10.6 10.0   < > 10.3   PLATELETS 10*3/mm3 74* 76* 112* 95* 90* 73* 88* 85*   < > 76*   IMM GRAN % %  --  1.2*  --  0.4 0.4 0.5  --   --   --  0.4   NEUTROS ABS 10*3/mm3  --  1.20* 2.77 1.78 1.54* 1.16* 1.80 1.93   < > 1.68*   LYMPHS ABS 10*3/mm3  --  0.36* 1.02 0.53* 0.59* 0.51*  --  0.50*   < > 0.43*   MONOS ABS 10*3/mm3  --  0.12 0.22 0.22 0.11 0.16  --  0.16   < > 0.19   EOS ABS 10*3/mm3  --  0.02 0.03 0.03 0.03 0.03 0.02 0.03   < > 0.02   BASOS ABS 10*3/mm3  --  0.01 0.01 0.01 0.01 0.01  --  0.01   < > 0.00   IMMATURE GRANS (ABS) 10*3/mm3  --  0.02  --  0.01 0.01 0.01  --   --   --  0.01   NRBC /100 WBC  --  0.0  --  0.0 0.0 0.0  --   --   --  0.0   NEUTROPHIL % %  --   --   --   --   --   --  70.4  --   --   --    MONOCYTES % %  --   --   --   --   --   --  3.1*  --   --   --    ATYP LYMPH % %   --   --   --   --   --   --  3.1  --   --   --    ANISOCYTOSIS   --   --   --   --   --   --  Mod/2+  --   --   --    GIANT PLT   --   --   --   --   --   --  Slight/1+  --   --   --     < > = values in this interval not displayed.       Lab Results - Last 18 Months   Lab Units 04/11/23  1348 02/28/23  0852 01/18/23  1409 01/10/23  1344 11/22/22  0937 10/11/22  1258 06/22/22  1108 01/19/22  0944 01/17/22  1246   GLUCOSE mg/dL 131* 153*  --  124* 162* 129* 242*   < > 208*   SODIUM mmol/L 142 142  --  141 137 142 138   < > 140   POTASSIUM mmol/L 4.0 3.6  --  4.2 4.5 3.6 4.3   < > 4.3   CO2 mmol/L 25.0 27.0  --  26.0 25.0 26.0 25.0   < > 23.0   CHLORIDE mmol/L 106 106  --  103 101 107 102   < > 103   ANION GAP mmol/L 11.0 9.0  --  12.0 11.0 9.0 11.0   < > 14.0   CREATININE mg/dL 0.42* 0.47* 0.70 0.58 0.59 0.61 0.53*   < > 0.53*   BUN mg/dL 10 10  --  14 11 8 10   < > 12   BUN / CREAT RATIO  23.8 21.3  --  24.1 18.6 13.1 18.9   < > 22.6   CALCIUM mg/dL 8.7 9.3  --  9.4 9.4 9.6 9.2   < > 9.2   EGFR IF NONAFRICN AM mL/min/1.73  --   --   --   --   --   --   --   --  118   ALK PHOS U/L 121* 84  --  77 88 68 85   < > 94   TOTAL PROTEIN g/dL 5.8* 6.0  --  6.8 6.3 6.6 6.4   < > 6.7   ALT (SGPT) U/L 24 21  --  23 21 12 14   < > 27   AST (SGOT) U/L 31 28  --  20 22 17 20   < > 17   BILIRUBIN mg/dL 1.8* 1.4*  --  1.8* 2.2* 2.2* 1.9*   < > 1.9*   ALBUMIN g/dL 4.4 4.3  --  4.9 4.60 4.80 4.80   < > 4.90   GLOBULIN gm/dL 1.4 1.7  --  1.9 1.7 1.8 1.6   < > 1.8    < > = values in this interval not displayed.       Lab Results - Last 18 Months   Lab Units 04/11/23  1348 01/10/23  1344 10/11/22  1258 06/22/22  1108 05/12/22  1029 05/05/22  1024 04/07/22  0820   URIC ACID mg/dL  --   --   --   --  8.0*  --   --    LDH U/L 440* 187 285* 379* 285* 278* 242*   REFERENCE LAB REPORT   --   --   --   --   --   --  See attached report  See Attached Result  See Attached Report       Lab Results - Last 18 Months   Lab Units 01/10/23  1341  "10/11/22  1258 05/12/22  1029 01/17/22  1246   IRON mcg/dL 71 83  --  89   TIBC mcg/dL 358 325  --  344   IRON SATURATION % 20 26  --  26   FERRITIN ng/mL 366.50* 315.90* 262* 380.20*   TSH uIU/mL  --   --   --  1.230   FOLATE ng/mL  --   --   --  10.50       Lucía K Mariella reports a pain score of 0.      ASSESSMENT:  1.  Pancytopenia, from hypersplenism.  2.  2% monoclonal B-cell population on previous bone marrow biopsy 10/31/2019.  Repeat bone marrow biopsy 03/01/2022.  Less than 1% small monoclonal B-cell population with a nonspecific immune phenotype.  Normal cytogenetics.  Variably hypercellular marrow with maturing trilineage hematopoiesis, erythroid hyperplasia and mild interstitial nodule small lymphocytic infiltrate, 5 to 10% adequate iron storage.  No evidence of overt myelodysplasia or plasma cell neoplasm.  Negative JAK2 and BCR ABL.  Negative Bcl-2, BCL6, 7 q. and MYB 88.  Stable for observation.  3.  Splenomegaly 18 cm on 01/19/2022.  Negative PET on 01/25/2022.  No adenopathies on CT of the chest, abdomen and pelvis on 01/19/2022.  4.  Anemia of chronic disease. On observation.  5.  Weight loss secondary to early satiety and hypersplenism.   6.  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  Not associated with increased risk to develop critical symptoms of hemochromatosis.         PLAN:  1.   Re: Interval CBC on 2/20/2023.  WBC 1.7, ANC 1.2, hemoglobin 9.2, moderate 29.2, MCV 91.3 and platelets 76.  2.   Re: Heme status. WBC 1.17, ANC 0.96, hemoglobin 7.2. hematocrit 23.8, and platelet 74 from 76 from 112 from 90 from 73. Call if temperature 101 or above. \"Yea.\"  Ferritin pending from 366.5 from 315.9 and iron saturation pending%.  Hemochromatosis gene mutation 3/29/2023.  Heterozygous for 187C>G.  Not associated with increased risk to develop clinical symptoms of hemochromatosis.  3.   Re: CMP. Alkaline phosphatase 121 and bilirubin 1.8 from 1.4 from 1.8 from 2.2 from " 2.2 from 1.9.  Monitor closely.  4.   Re: LDH. 440 from 187 from 285 from 379. AFP pending.   5.   Re: CT chest, abdomen and pelvis on 1/18/2023. No evidence of recurrent lymphadenopathy. The lungs remain fully expanded and clear except for remote granulomatous disease.  Mild coronary calcifications are present.    Marked splenomegaly as seen on the CT from January, 2022, essentially unchanged. There is dilation and tortuosity of the splenic vein. There is a small stable 1.3 cm splenic artery aneurysm without evidence of  rupture. No intra-abdominal or pelvic lymphadenopathy is identified.  Evidence of previous cholecystectomy and hysterectomy.  A few diverticula are seen in the sigmoid colon without evidence of acute diverticulitis.  6.  CBC with differential and CMP in 6 weeks.  7.  Continue care per primary care physician and other specialist.  8.  Plan of care discussed with patient.  Understanding expressed.  Patient agreeable to proceed.  9.  Plan for splenectomy if the patient have develop new symptom like losing 10 pounds over 6 months, recurrent major infections, transfusion dependent cytopenias or development of splenic infarct.  10.  Hold splenectomy, she had weight loss.  Pneumococcal and meningococcal vaccine 2 weeks prior to splenectomy.  11.  No phlebotomy due to existing anemia.   12.  Return to office in 3 months with CBC with differential, CMP, ferritin, iron panel, and LDH, same day.          I have reviewed the assessment and plan and verified the accuracy of it. No changes to assessment and plan since the information was documented. Benigno Hastings MD 04/11/23          I spent 34 total minutes, face-to-face, caring for Lucía today.  Greater than 50% of this time involved counseling and/or coordination of care as documented within this note regarding the patient's illness(es), pros and cons of various treatment options, instructions and/or risk reduction.          ([place],  MD BrowneVan BurenMD Maylin Vallejo, APRN

## 2023-03-29 ENCOUNTER — CLINICAL SUPPORT (OUTPATIENT)
Dept: GENETICS | Facility: HOSPITAL | Age: 63
End: 2023-03-29
Payer: COMMERCIAL

## 2023-03-29 DIAGNOSIS — Z13.79 GENETIC TESTING: ICD-10-CM

## 2023-03-29 DIAGNOSIS — R79.89 ELEVATED FERRITIN LEVEL: ICD-10-CM

## 2023-03-29 NOTE — PROGRESS NOTES
Lucía Wolff is a 62-year-old female who was seen for genetic counseling to discuss prior genetic results. Genetic counseling was performed via telephone. Ms. Wolff was in-person at [facility] at the time of the appointment. She previously had genetic testing performed at [facility]’s office due to a history of high ferritin levels. [facility]. This test looks for the three most common variants associated with hemochromatosis. Genetic testing was positive for a single heterozygous mutation in the HFE gene (c.187C>G), meaning she is considered a carrier of hemochromatosis. Ms. Wolff was interested in discussing the information related to this finding.    FAMILY HISTORY (see attached pedigree):   Father:   Diabetes     MI, 42  Pat Aunt:  Diabetes  Pat Uncle:  Diabetes  Pat Grandmother: Diabetes    We do not have medical records regarding the diagnoses in Ms. Wolff’s family.    GENETIC COUNSELING (30 minutes):  Hemochromatosis is characterized by an increased absorption of iron causing excessive storage of iron in the liver, skin, pancreas, heart, and joints. Iron levels in a person’s blood are also increased. Hereditary hemochromatosis is most often caused by mutations in the HFE gene. This condition is inherited in an autosomal recessive manner meaning that an individual must have mutations on both copies of the HFE gene (one inherited from each parent) to be at risk for developing hemochromatosis. The HFE mutation carrier frequency is estimated to be 1 in 9 for individuals of  descent. Not all individuals who have two mutated copies of the HFE gene develop increased iron in the blood or other clinical symptoms; therefore, this condition has incomplete penetrance. About 70% of individuals with two HFE mutations have elevated ferritin, but less than 10% have clinically significant issues related to iron overload. An individual who has a mutation on one  copy of the HFE gene is known as a carrier and is at a very low risk for having increased iron absorption.      GENETIC TESTING:  The risks, benefits and limitations of genetic testing and implications for clinical management following testing were reviewed. DNA test results can influence decisions regarding screening and prevention.  Genetic testing can have significant psychological implications for both individuals and families. Also discussed was the possibility of employment and insurance discrimination based on genetic test results and the federal and states laws that are in place to prevent this (MARIA R), as well as the limitations of these laws.      TEST RESULTS: The hereditary hemochromatosis panel was positive for a single pathogenic (low penetrance) variant in the HFE gene, meaning he is considered a carrier of hemochromatosis.  This variant is a common, low penetrance variant that is known to contribute to hemochromatosis when homozygous (two mutations present). Ms. Wolff only tested positive for one variant in the HFE gene, therefore she is considered only a carrier. Hereditary hemochromatosis is an autosomal recessive condition, meaning both parents must carry mutations in the HFE gene in order for their children to be at risk. If both parents are carriers, there is a 1 in 4 chance (with each pregnancy) to have a child who is at increased risk for hemochromatosis. Ms. Wolff’s children have a 50% chance of also being a carrier for hemochromatosis. Her screening and management should be determined by her physicians. She has routing imaging of her liver that has always been normal.     PLAN: Genetic counseling remains available to Ms. Wolff. We discussed the possibility of her donating blood to see if that affected her ferritin levels. She was going to look into this. She is welcome to contact us with any questions or concerns at [phone].    Sharona Frank, MS, [facility], [facility]  Licensed Certified  Genetic Counselor    Cc: ANAIS Dubose

## 2023-04-11 ENCOUNTER — TELEPHONE (OUTPATIENT)
Dept: ONCOLOGY | Facility: CLINIC | Age: 63
End: 2023-04-11

## 2023-04-11 ENCOUNTER — LAB (OUTPATIENT)
Dept: LAB | Facility: HOSPITAL | Age: 63
End: 2023-04-11
Payer: COMMERCIAL

## 2023-04-11 ENCOUNTER — OFFICE VISIT (OUTPATIENT)
Dept: ONCOLOGY | Facility: CLINIC | Age: 63
End: 2023-04-11
Payer: COMMERCIAL

## 2023-04-11 VITALS
SYSTOLIC BLOOD PRESSURE: 110 MMHG | WEIGHT: 145.2 LBS | HEART RATE: 93 BPM | RESPIRATION RATE: 18 BRPM | HEIGHT: 65 IN | OXYGEN SATURATION: 99 % | TEMPERATURE: 98.5 F | BODY MASS INDEX: 24.19 KG/M2 | DIASTOLIC BLOOD PRESSURE: 58 MMHG

## 2023-04-11 DIAGNOSIS — E83.110 HEREDITARY HEMOCHROMATOSIS: ICD-10-CM

## 2023-04-11 DIAGNOSIS — C83.07 MARGINAL ZONE LYMPHOMA OF SPLEEN: Primary | ICD-10-CM

## 2023-04-11 DIAGNOSIS — K74.60 HEPATIC CIRRHOSIS, UNSPECIFIED HEPATIC CIRRHOSIS TYPE, UNSPECIFIED WHETHER ASCITES PRESENT: ICD-10-CM

## 2023-04-11 DIAGNOSIS — C83.07 MARGINAL ZONE LYMPHOMA OF SPLEEN: ICD-10-CM

## 2023-04-11 DIAGNOSIS — D50.9 IRON DEFICIENCY ANEMIA, UNSPECIFIED IRON DEFICIENCY ANEMIA TYPE: Primary | ICD-10-CM

## 2023-04-11 DIAGNOSIS — D61.818 PANCYTOPENIA, ACQUIRED: Primary | ICD-10-CM

## 2023-04-11 DIAGNOSIS — K75.81 NONALCOHOLIC STEATOHEPATITIS (NASH): ICD-10-CM

## 2023-04-11 LAB
ALBUMIN SERPL-MCNC: 4.4 G/DL (ref 3.5–5.2)
ALBUMIN/GLOB SERPL: 3.1 G/DL
ALP SERPL-CCNC: 121 U/L (ref 39–117)
ALT SERPL W P-5'-P-CCNC: 24 U/L (ref 1–33)
ANION GAP SERPL CALCULATED.3IONS-SCNC: 11 MMOL/L (ref 5–15)
ANISOCYTOSIS BLD QL: ABNORMAL
AST SERPL-CCNC: 31 U/L (ref 1–32)
BILIRUB SERPL-MCNC: 1.8 MG/DL (ref 0–1.2)
BUN SERPL-MCNC: 10 MG/DL (ref 8–23)
BUN/CREAT SERPL: 23.8 (ref 7–25)
CALCIUM SPEC-SCNC: 8.7 MG/DL (ref 8.6–10.5)
CHLORIDE SERPL-SCNC: 106 MMOL/L (ref 98–107)
CO2 SERPL-SCNC: 25 MMOL/L (ref 22–29)
CREAT SERPL-MCNC: 0.42 MG/DL (ref 0.57–1)
DACRYOCYTES BLD QL SMEAR: ABNORMAL
DEPRECATED RDW RBC AUTO: 63 FL (ref 37–54)
EGFRCR SERPLBLD CKD-EPI 2021: 112.1 ML/MIN/1.73
EOSINOPHIL # BLD MANUAL: 0.07 10*3/MM3 (ref 0–0.4)
EOSINOPHIL NFR BLD MANUAL: 5.9 % (ref 0.3–6.2)
ERYTHROCYTE [DISTWIDTH] IN BLOOD BY AUTOMATED COUNT: 19.2 % (ref 12.3–15.4)
FERRITIN SERPL-MCNC: 259.5 NG/ML (ref 13–150)
GLOBULIN UR ELPH-MCNC: 1.4 GM/DL
GLUCOSE SERPL-MCNC: 131 MG/DL (ref 65–99)
HCT VFR BLD AUTO: 23.8 % (ref 34–46.6)
HGB BLD-MCNC: 7.2 G/DL (ref 12–15.9)
HOLD SPECIMEN: NORMAL
INR PPP: 1.15 (ref 0.91–1.09)
IRON 24H UR-MRATE: 56 MCG/DL (ref 37–145)
IRON SATN MFR SERPL: 19 % (ref 20–50)
LDH SERPL-CCNC: 440 U/L (ref 135–214)
LYMPHOCYTES # BLD MANUAL: 0.14 10*3/MM3 (ref 0.7–3.1)
MCH RBC QN AUTO: 27.8 PG (ref 26.6–33)
MCHC RBC AUTO-ENTMCNC: 30.3 G/DL (ref 31.5–35.7)
MCV RBC AUTO: 91.9 FL (ref 79–97)
MICROCYTES BLD QL: ABNORMAL
NEUTROPHILS # BLD AUTO: 0.96 10*3/MM3 (ref 1.7–7)
NEUTROPHILS NFR BLD MANUAL: 82.4 % (ref 42.7–76)
PLATELET # BLD AUTO: 74 10*3/MM3 (ref 140–450)
PMV BLD AUTO: 9.7 FL (ref 6–12)
POIKILOCYTOSIS BLD QL SMEAR: ABNORMAL
POLYCHROMASIA BLD QL SMEAR: ABNORMAL
POTASSIUM SERPL-SCNC: 4 MMOL/L (ref 3.5–5.2)
PROT SERPL-MCNC: 5.8 G/DL (ref 6–8.5)
PROTHROMBIN TIME: 14.9 SECONDS (ref 11.8–14.8)
RBC # BLD AUTO: 2.59 10*6/MM3 (ref 3.77–5.28)
SMALL PLATELETS BLD QL SMEAR: ABNORMAL
SODIUM SERPL-SCNC: 142 MMOL/L (ref 136–145)
TIBC SERPL-MCNC: 298 MCG/DL (ref 298–536)
TRANSFERRIN SERPL-MCNC: 200 MG/DL (ref 200–360)
VARIANT LYMPHS NFR BLD MANUAL: 11.8 % (ref 19.6–45.3)
WBC MORPH BLD: NORMAL
WBC NRBC COR # BLD: 1.17 10*3/MM3 (ref 3.4–10.8)

## 2023-04-11 PROCEDURE — 82728 ASSAY OF FERRITIN: CPT

## 2023-04-11 PROCEDURE — 85007 BL SMEAR W/DIFF WBC COUNT: CPT

## 2023-04-11 PROCEDURE — 84466 ASSAY OF TRANSFERRIN: CPT

## 2023-04-11 PROCEDURE — 82105 ALPHA-FETOPROTEIN SERUM: CPT

## 2023-04-11 PROCEDURE — 85610 PROTHROMBIN TIME: CPT

## 2023-04-11 PROCEDURE — 85025 COMPLETE CBC W/AUTO DIFF WBC: CPT

## 2023-04-11 PROCEDURE — 99214 OFFICE O/P EST MOD 30 MIN: CPT | Performed by: INTERNAL MEDICINE

## 2023-04-11 PROCEDURE — 36415 COLL VENOUS BLD VENIPUNCTURE: CPT

## 2023-04-11 PROCEDURE — 83540 ASSAY OF IRON: CPT

## 2023-04-11 PROCEDURE — 83615 LACTATE (LD) (LDH) ENZYME: CPT

## 2023-04-11 PROCEDURE — 80053 COMPREHEN METABOLIC PANEL: CPT

## 2023-04-11 RX ORDER — FERROUS SULFATE 325(65) MG
325 TABLET ORAL
Qty: 60 TABLET | Refills: 2 | Status: SHIPPED | OUTPATIENT
Start: 2023-04-11

## 2023-04-11 NOTE — TELEPHONE ENCOUNTER
----- Message from Benigno Hastings MD sent at 4/11/2023  3:05 PM CDT -----  1.  eRx ferrous sulfate 325 mg p.o. daily #60 with 2 refills.  Stop and call if intolerant.  Patient has fatigue from anemia due to iron deficiency.  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  Not associated with increased risk to develop critical symptoms of hemochromatosis    2.  Add ferritin and iron profile in 6 weeks.

## 2023-04-11 NOTE — LETTER
April 11, 2023       No Recipients    Patient: Lucía Wolff   YOB: 1960   Date of Visit: 4/11/2023       Dear Dr. Palomares Recipients:    Thank you for referring Lucía Wolff to me for evaluation. Below are the relevant portions of my assessment and plan of care.    If you have questions, please do not hesitate to call me. I look forward to following Lucía along with you.         Sincerely,        Benigno Hastings MD        CC:   No Recipients    Benigno Hastings MD  04/11/23 1439  Signed  [facility] HEMATOLOGY & ONCOLOGY [address]  [facility] [address] [phone]    Patient Name: Lucía Wolff  Encounter Date: 04/11/2023  YOB: 1960  Patient Number: [id number]      REASON FOR FOLLOW-UP: Lucía Wolff is a pleasant 62 y.o. 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  Her bone marrow showed B-cell lymphoma. She is also seen for splenomegaly from B-cell lymphoma.  She is on observation. She is seen alone. History from the patient.  She is a reliable historian.          Oncology/Hematology History Overview Note   DIAGNOSTIC ABNORMALITIES:  CBC 01/17/2022. Remarkable for platelet 97. Bilirubin 1.9.   CT neck 01/19/2022.  No cervical lymphadenopathy by size criteria.  Asymmetric appearance of the left submandibular gland. Correlation for sialoadenitis recommended. If indicated, dedicated ultrasound may be  Performed.  CT chest 01/19/2022. No intrathoracic lymphadenopathy. No axillary lymphadenopathy  CT abdomen and pelvis 01/19/2022. Massive splenomegaly.  Splenic artery aneurysm, 1.5 cm.  No additional abdominal or pelvic lymphadenopathy.  PET 01/25/2022. No hypermetabolic lymphadenopathy or tumor. No scintigraphic evidence of malignancy. . Persistent prominent splenomegaly.  Recommendations from [place] on 05/12/2022.  The palpable splenomegaly in the absence of  additional findings to suspect avid adenopathy and liver disease suggested the patient has hypersplenism.  Other differential include nutritional deficiencies or cyclical neutropenia.  The enlarging spleen is not significantly PET avid therefore splenectomy would not be recommended at this time.  Other reason is infection with postsplenectomy state. Would recommend splenectomy if the patient continues to develop new symptoms for example losing another 10 pounds in 6 months, recurrent major infections, transfusion dependent cytopenias or splenic infarcts.   JAK2 was negative, normal copper at 123.2, B12 at 748, reticulocyte 4%, negative hepatitis C antibody, uric acid 8, WBC 3.8, hemoglobin 12.4, MCV 89, platelet 116, bilirubin 2.5, , and ferritin 262 at [place].           PREVIOUS INTERVENTIONS:None.     Marginal zone lymphoma of spleen   3/28/2022 Initial Diagnosis    Marginal zone lymphoma of spleen (HCC)         PAST MEDICAL HISTORY:  ALLERGIES:  No Known Allergies  CURRENT MEDICATIONS:  Outpatient Encounter Medications as of 4/11/2023   Medication Sig Dispense Refill   • acetaminophen (TYLENOL) 325 MG tablet Take 2 tablets by mouth Every 6 (Six) Hours As Needed for Mild Pain.     • calcium carbonate (OS-MITRA) 600 MG tablet Take 1 tablet by mouth 2 (Two) Times a Day With Meals.     • Cholecalciferol (vitamin D3) 125 MCG (5000 UT) capsule capsule Take 1 capsule by mouth Daily.     • Cinnamon 500 MG capsule Every 12 (Twelve) Hours.     • cyanocobalamin 1000 MCG/ML injection INJECT 1 ML EVERY 28 DAYS. 12 mL 0   • Dextromethorphan-guaiFENesin (MUCINEX DM PO) Take 1 tablet by mouth 2 (Two) Times a Day As Needed.     • folic acid (FOLVITE) 1 MG tablet Take 1 tablet by mouth Daily.     • metFORMIN ER (GLUCOPHAGE-XR) 500 MG 24 hr tablet      • Multiple Vitamins-Minerals (MULTIVITAMIN WITH MINERALS) tablet tablet Take 1 tablet by mouth Daily.     • Omega-3 300 MG capsule Take 4 capsules by mouth Daily.     •  "Semaglutide,0.25 or 0.5MG/DOS, (OZEMPIC) 2 MG/1.5ML solution pen-injector Inject 0.25 mg under the skin into the appropriate area as directed 1 (One) Time Per Week.     • Syringe 25G X 1\" 3 ML misc USE 1 SYRINGE PER B12 INJECTION 12 each 0   • Thyroid 65 MG PO tablet Take 1 tablet by mouth Daily.     • VIT B12-METHIONINE-INOS-CHOL IM Inject 1 mL into the appropriate muscle as directed by prescriber Every 30 (Thirty) Days. Has not taken since 2020 at least        No facility-administered encounter medications on file as of 4/11/2023.     ADULT ILLNESSES:  Patient Active Problem List   Diagnosis Code   • Heart murmur R01.1   • Thrombocytopenia D69.6   • Marginal zone lymphoma of spleen C83.07   • Splenomegaly R16.1   • Pancytopenia, acquired D61.818     SURGERIES:  Past Surgical History:   Procedure Laterality Date   • CHOLECYSTECTOMY     • HYSTERECTOMY  2003    PARTIAL   • LIVER BIOPSY  08/12/2019     HEALTH MAINTENANCE ITEMS:  Health Maintenance Due   Topic Date Due   • URINE MICROALBUMIN  Never done   • Pneumococcal Vaccine 0-64 (1 - PCV) Never done   • TDAP/TD VACCINES (1 - Tdap) Never done   • ZOSTER VACCINE (1 of 2) Never done   • ANNUAL PHYSICAL  Never done   • DIABETIC FOOT EXAM  Never done   • PAP SMEAR  Never done   • HEMOGLOBIN A1C  Never done   • DIABETIC EYE EXAM  Never done   • MAMMOGRAM  11/01/2020   • Hepatitis B (1 of 3 - Risk 3-dose series) Never done   • COVID-19 Vaccine (5 - Booster for Pfizer series) 09/30/2022       <no information>  Last Completed Colonoscopy          COLORECTAL CANCER SCREENING (COLONOSCOPY - Every 10 Years) Next due on 10/25/2032    10/25/2022  Outside Procedure: NC COLONOSCOPY FLX DX W/COLLJ SPEC WHEN PFRMD              Immunization History   Administered Date(s) Administered   • COVID-19 (PFIZER) PURPLE CAP 03/26/2021, 04/16/2021, 11/29/2021   • Covid-19 (Pfizer) Gray Cap 08/05/2022     Last Completed Mammogram     This patient has no relevant Health Maintenance data.    " "        FAMILY HISTORY:  Family History   Problem Relation Age of Onset   • Alzheimer's disease Mother    • Diabetes Father    • Stroke Father    • Cardiomyopathy Father    • Heart attack Father    • Heart disease Father    • Hypertension Father    • Cancer Brother         She is unclear of the kind of cancer   • Diabetes Brother    • Diabetes Brother    • Hypertension Brother    • No Known Problems Brother    • No Known Problems Son    • No Known Problems Son    • No Known Problems Daughter      SOCIAL HISTORY:  Social History     Socioeconomic History   • Marital status:    Tobacco Use   • Smoking status: Never   • Smokeless tobacco: Never   • Tobacco comments:     has tried it but did not keep smoking   Substance and Sexual Activity   • Alcohol use: Never   • Drug use: No   • Sexual activity: Defer       REVIEW OF SYSTEMS:    Review of Systems   Constitutional: Positive for fatigue. Negative for fever and unexpected weight change.        \"I tire easy.\"   HENT: Negative for congestion, mouth sores and trouble swallowing.    Eyes: Negative for redness and visual disturbance.   Respiratory: Negative for cough, shortness of breath and wheezing.    Cardiovascular: Negative for chest pain and palpitations.   Gastrointestinal: Negative for abdominal pain, constipation, diarrhea, nausea and vomiting.   Endocrine: Negative for polydipsia and polyphagia.   Genitourinary: Negative for dysuria, flank pain and hematuria.   Musculoskeletal: Negative for gait problem and neck stiffness.   Skin: Positive for pallor.   Allergic/Immunologic: Negative for food allergies.   Neurological: Negative for dizziness, speech difficulty and weakness.   Hematological: Negative for adenopathy. Does not bruise/bleed easily.   Psychiatric/Behavioral: Negative for agitation, confusion and hallucinations.       VITAL SIGNS: /58   Pulse 93   Temp 98.5 °F (36.9 °C)   Resp 18   Ht 165.1 cm (65\")   Wt 65.9 kg (145 lb 3.2 oz)   " SpO2 99%   Breastfeeding No   BMI 24.16 kg/m²  Gained 5 pounds.   Pain Score    04/11/23 1409   PainSc: 0-No pain       PHYSICAL EXAMINATION:     Physical Exam  Vitals reviewed.   Constitutional:       General: She is not in acute distress.  HENT:      Head: Normocephalic and atraumatic.   Eyes:      General: No scleral icterus.  Cardiovascular:      Rate and Rhythm: Normal rate.   Pulmonary:      Effort: No respiratory distress.      Breath sounds: No wheezing.   Abdominal:      General: Bowel sounds are normal.      Palpations: Abdomen is soft.      Tenderness: There is no abdominal tenderness.      Comments: Palpable spleen.   Musculoskeletal:         General: No swelling.      Cervical back: Neck supple.   Skin:     General: Skin is warm.      Coloration: Skin is pale.   Neurological:      Mental Status: She is alert and oriented to person, place, and time.   Psychiatric:         Mood and Affect: Mood normal.         Behavior: Behavior normal.         Thought Content: Thought content normal.         Judgment: Judgment normal.         LABS    Lab Results - Last 18 Months   Lab Units 04/11/23  1348 02/28/23  0852 01/10/23  1344 11/22/22  0937 10/11/22  1258 08/03/22  1036 06/22/22  1108 05/05/22  1024 04/07/22  0820 03/01/22  1033   HEMOGLOBIN g/dL 7.2* 9.2* 12.7 10.2* 10.1* 8.6* 9.9* 10.7*   < > 10.6*   HEMATOCRIT % 23.8* 29.2* 38.0 31.4* 31.7* 27.4* 29.6* 33.6*   < > 31.7*   MCV fL 91.9 91.3 86.6 89.2 90.1 90.4 88.4 89.6   < > 87.3   WBC 10*3/mm3 1.17* 1.73* 4.07 2.58* 2.29* 1.88* 2.45* 2.64*   < > 2.33*   RDW % 19.2* 18.6* 16.2* 16.1* 17.6* 17.3* 18.3* 17.1*   < > 15.9*   MPV fL 9.7 10.3 10.3 9.7 9.6 9.9 10.6 10.0   < > 10.3   PLATELETS 10*3/mm3 74* 76* 112* 95* 90* 73* 88* 85*   < > 76*   IMM GRAN % %  --  1.2*  --  0.4 0.4 0.5  --   --   --  0.4   NEUTROS ABS 10*3/mm3  --  1.20* 2.77 1.78 1.54* 1.16* 1.80 1.93   < > 1.68*   LYMPHS ABS 10*3/mm3  --  0.36* 1.02 0.53* 0.59* 0.51*  --  0.50*   < > 0.43*   MONOS  ABS 10*3/mm3  --  0.12 0.22 0.22 0.11 0.16  --  0.16   < > 0.19   EOS ABS 10*3/mm3  --  0.02 0.03 0.03 0.03 0.03 0.02 0.03   < > 0.02   BASOS ABS 10*3/mm3  --  0.01 0.01 0.01 0.01 0.01  --  0.01   < > 0.00   IMMATURE GRANS (ABS) 10*3/mm3  --  0.02  --  0.01 0.01 0.01  --   --   --  0.01   NRBC /100 WBC  --  0.0  --  0.0 0.0 0.0  --   --   --  0.0   NEUTROPHIL % %  --   --   --   --   --   --  70.4  --   --   --    MONOCYTES % %  --   --   --   --   --   --  3.1*  --   --   --    ATYP LYMPH % %  --   --   --   --   --   --  3.1  --   --   --    ANISOCYTOSIS   --   --   --   --   --   --  Mod/2+  --   --   --    GIANT PLT   --   --   --   --   --   --  Slight/1+  --   --   --     < > = values in this interval not displayed.       Lab Results - Last 18 Months   Lab Units 04/11/23  1348 02/28/23  0852 01/18/23  1409 01/10/23  1344 11/22/22  0937 10/11/22  1258 06/22/22  1108 01/19/22  0944 01/17/22  1246   GLUCOSE mg/dL 131* 153*  --  124* 162* 129* 242*   < > 208*   SODIUM mmol/L 142 142  --  141 137 142 138   < > 140   POTASSIUM mmol/L 4.0 3.6  --  4.2 4.5 3.6 4.3   < > 4.3   CO2 mmol/L 25.0 27.0  --  26.0 25.0 26.0 25.0   < > 23.0   CHLORIDE mmol/L 106 106  --  103 101 107 102   < > 103   ANION GAP mmol/L 11.0 9.0  --  12.0 11.0 9.0 11.0   < > 14.0   CREATININE mg/dL 0.42* 0.47* 0.70 0.58 0.59 0.61 0.53*   < > 0.53*   BUN mg/dL 10 10  --  14 11 8 10   < > 12   BUN / CREAT RATIO  23.8 21.3  --  24.1 18.6 13.1 18.9   < > 22.6   CALCIUM mg/dL 8.7 9.3  --  9.4 9.4 9.6 9.2   < > 9.2   EGFR IF NONAFRICN AM mL/min/1.73  --   --   --   --   --   --   --   --  118   ALK PHOS U/L 121* 84  --  77 88 68 85   < > 94   TOTAL PROTEIN g/dL 5.8* 6.0  --  6.8 6.3 6.6 6.4   < > 6.7   ALT (SGPT) U/L 24 21  --  23 21 12 14   < > 27   AST (SGOT) U/L 31 28  --  20 22 17 20   < > 17   BILIRUBIN mg/dL 1.8* 1.4*  --  1.8* 2.2* 2.2* 1.9*   < > 1.9*   ALBUMIN g/dL 4.4 4.3  --  4.9 4.60 4.80 4.80   < > 4.90   GLOBULIN gm/dL 1.4 1.7  --  1.9 1.7  1.8 1.6   < > 1.8    < > = values in this interval not displayed.       Lab Results - Last 18 Months   Lab Units 04/11/23  1348 01/10/23  1344 10/11/22  1258 06/22/22  1108 05/12/22  1029 05/05/22  1024 04/07/22  0820   URIC ACID mg/dL  --   --   --   --  8.0*  --   --    LDH U/L 440* 187 285* 379* 285* 278* 242*   REFERENCE LAB REPORT   --   --   --   --   --   --  See attached report  See Attached Result  See Attached Report       Lab Results - Last 18 Months   Lab Units 01/10/23  1344 10/11/22  1258 05/12/22  1029 01/17/22  1246   IRON mcg/dL 71 83  --  89   TIBC mcg/dL 358 325  --  344   IRON SATURATION % 20 26  --  26   FERRITIN ng/mL 366.50* 315.90* 262* 380.20*   TSH uIU/mL  --   --   --  1.230   FOLATE ng/mL  --   --   --  10.50       Lucía Wolff reports a pain score of 0.      ASSESSMENT:  1.  Pancytopenia, from hypersplenism.  2.  2% monoclonal B-cell population on previous bone marrow biopsy 10/31/2019.  Repeat bone marrow biopsy 03/01/2022.  Less than 1% small monoclonal B-cell population with a nonspecific immune phenotype.  Normal cytogenetics.  Variably hypercellular marrow with maturing trilineage hematopoiesis, erythroid hyperplasia and mild interstitial nodule small lymphocytic infiltrate, 5 to 10% adequate iron storage.  No evidence of overt myelodysplasia or plasma cell neoplasm.  Negative JAK2 and BCR ABL.  Negative Bcl-2, BCL6, 7 q. and MYB 88.  Stable for observation.  3.  Splenomegaly 18 cm on 01/19/2022.  Negative PET on 01/25/2022.  No adenopathies on CT of the chest, abdomen and pelvis on 01/19/2022.  4.  Anemia of chronic disease. On observation.  5.  Weight loss secondary to early satiety and hypersplenism.   6.  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  Not associated with increased risk to develop critical symptoms of hemochromatosis.         PLAN:  1.   Re: Interval CBC on 2/20/2023.  WBC 1.7, ANC 1.2, hemoglobin 9.2, moderate 29.2, MCV 91.3 and platelets  "76.  2.   Re: Heme status. WBC 1.17, ANC 0.96, hemoglobin 7.2. hematocrit 23.8, and platelet 74 from 76 from 112 from 90 from 73. Call if temperature 101 or above. \"Yea.\"  Ferritin pending from 366.5 from 315.9 and iron saturation pending%.  Hemochromatosis gene mutation 3/29/2023.  Heterozygous for 187C>G.  Not associated with increased risk to develop clinical symptoms of hemochromatosis.  3.   Re: CMP. Alkaline phosphatase 121 and bilirubin 1.8 from 1.4 from 1.8 from 2.2 from 2.2 from 1.9.  Monitor closely.  4.   Re: LDH. 440 from 187 from 285 from 379. AFP pending.   5.   Re: CT chest, abdomen and pelvis on 1/18/2023. No evidence of recurrent lymphadenopathy. The lungs remain fully expanded and clear except for remote granulomatous disease.  Mild coronary calcifications are present.    Marked splenomegaly as seen on the CT from January, 2022, essentially unchanged. There is dilation and tortuosity of the splenic vein. There is a small stable 1.3 cm splenic artery aneurysm without evidence of  rupture. No intra-abdominal or pelvic lymphadenopathy is identified.  Evidence of previous cholecystectomy and hysterectomy.  A few diverticula are seen in the sigmoid colon without evidence of acute diverticulitis.  6.  CBC with differential and CMP in 6 weeks.  7.  Continue care per primary care physician and other specialist.  8.  Plan of care discussed with patient.  Understanding expressed.  Patient agreeable to proceed.  9.  Plan for splenectomy if the patient have develop new symptom like losing 10 pounds over 6 months, recurrent major infections, transfusion dependent cytopenias or development of splenic infarct.  10.  Hold splenectomy, she had weight loss.  Pneumococcal and meningococcal vaccine 2 weeks prior to splenectomy.  11.  No phlebotomy due to existing anemia.   12.  Return to office in 3 months with CBC with differential, CMP, ferritin, iron panel, and LDH, same day.          I " have reviewed the assessment and plan and verified the accuracy of it. No changes to assessment and plan since the information was documented. Benigno Hastings MD 04/11/23          I spent 34 total minutes, face-to-face, caring for Lucía today.  Greater than 50% of this time involved counseling and/or coordination of care as documented within this note regarding the patient's illness(es), pros and cons of various treatment options, instructions and/or risk reduction.          (Rio Pena MD [place])  MD Maylin Abernathy APRN

## 2023-04-11 NOTE — TELEPHONE ENCOUNTER
Call placed and message was left to notify patient Lucía Wolff that her iron sat was low as well as her HGB, prescription for oral iron tabs will be sent to the Pharmacy for  take as directed, patient informed it will cause her stools to be dark and black in color this is a common occurrence. But if she develops GI upset, constipation, or abdominal cramps d/c use of the tablets and notify the office. Please call if questions or concerns.

## 2023-04-12 LAB — ALPHA-FETOPROTEIN: 2.11 NG/ML (ref 0–8.3)

## 2023-04-19 ENCOUNTER — LAB (OUTPATIENT)
Dept: LAB | Facility: HOSPITAL | Age: 63
End: 2023-04-19
Payer: COMMERCIAL

## 2023-04-19 ENCOUNTER — TRANSCRIBE ORDERS (OUTPATIENT)
Dept: ADMINISTRATIVE | Facility: HOSPITAL | Age: 63
End: 2023-04-19
Payer: COMMERCIAL

## 2023-04-19 DIAGNOSIS — E78.5 HYPERLIPIDEMIA, UNSPECIFIED HYPERLIPIDEMIA TYPE: ICD-10-CM

## 2023-04-19 DIAGNOSIS — R73.9 HYPERGLYCEMIA: ICD-10-CM

## 2023-04-19 DIAGNOSIS — E55.9 VITAMIN D DEFICIENCY: ICD-10-CM

## 2023-04-19 DIAGNOSIS — E83.119 HEMOCHROMATOSIS, UNSPECIFIED HEMOCHROMATOSIS TYPE: ICD-10-CM

## 2023-04-19 DIAGNOSIS — R73.9 HYPERGLYCEMIA: Primary | ICD-10-CM

## 2023-04-19 DIAGNOSIS — E53.8 VITAMIN B 12 DEFICIENCY: Primary | ICD-10-CM

## 2023-04-19 DIAGNOSIS — E53.8 VITAMIN B 12 DEFICIENCY: ICD-10-CM

## 2023-04-19 DIAGNOSIS — E55.9 VITAMIN D DEFICIENCY: Primary | ICD-10-CM

## 2023-04-19 LAB
25(OH)D3 SERPL-MCNC: 33.7 NG/ML (ref 30–100)
HCYS SERPL-MCNC: 6.7 UMOL/L (ref 0–15)
T3FREE SERPL-MCNC: 3.21 PG/ML (ref 2–4.4)
T4 FREE SERPL-MCNC: 0.99 NG/DL (ref 0.93–1.7)
TSH SERPL DL<=0.05 MIU/L-ACNC: 1.33 UIU/ML (ref 0.27–4.2)

## 2023-04-19 PROCEDURE — 84481 FREE ASSAY (FT-3): CPT

## 2023-04-19 PROCEDURE — 84439 ASSAY OF FREE THYROXINE: CPT

## 2023-04-19 PROCEDURE — 84443 ASSAY THYROID STIM HORMONE: CPT

## 2023-04-19 PROCEDURE — 83090 ASSAY OF HOMOCYSTEINE: CPT

## 2023-04-19 PROCEDURE — 36415 COLL VENOUS BLD VENIPUNCTURE: CPT

## 2023-04-19 PROCEDURE — 82306 VITAMIN D 25 HYDROXY: CPT

## 2023-04-24 ENCOUNTER — TELEPHONE (OUTPATIENT)
Dept: ONCOLOGY | Facility: CLINIC | Age: 63
End: 2023-04-24
Payer: COMMERCIAL

## 2023-04-24 NOTE — TELEPHONE ENCOUNTER
Received call from patient Lucía Wolff, she calls to report that she started oral iron tablets Ferrous Sulfate on 4/11/23. Patient reports that she is not able to tolerate the oral iron tablets due to severe constipation issues and GI upset.    Recommendations?

## 2023-04-27 NOTE — TELEPHONE ENCOUNTER
Patient notified Dr Hastings has ordered IV Iron txt, patient can not tolerate oral iron due to severe GI upset and cramps.     Apt zhane: Friday May 5, 2023 @ 1 pm  Pt v/u of time and date of apt

## 2023-04-28 PROBLEM — D50.9 IRON DEFICIENCY ANEMIA: Status: ACTIVE | Noted: 2023-04-28

## 2023-05-04 RX ORDER — FAMOTIDINE 10 MG/ML
20 INJECTION, SOLUTION INTRAVENOUS AS NEEDED
OUTPATIENT
Start: 2023-05-15

## 2023-05-04 RX ORDER — SODIUM CHLORIDE 9 MG/ML
250 INJECTION, SOLUTION INTRAVENOUS ONCE
Status: CANCELLED | OUTPATIENT
Start: 2023-05-08

## 2023-05-04 RX ORDER — FAMOTIDINE 10 MG/ML
20 INJECTION, SOLUTION INTRAVENOUS AS NEEDED
Status: CANCELLED | OUTPATIENT
Start: 2023-05-08

## 2023-05-04 RX ORDER — SODIUM CHLORIDE 9 MG/ML
250 INJECTION, SOLUTION INTRAVENOUS ONCE
OUTPATIENT
Start: 2023-05-15

## 2023-05-04 RX ORDER — ACETAMINOPHEN 325 MG/1
650 TABLET ORAL ONCE
Status: CANCELLED | OUTPATIENT
Start: 2023-05-08

## 2023-05-04 RX ORDER — DIPHENHYDRAMINE HYDROCHLORIDE 50 MG/ML
50 INJECTION INTRAMUSCULAR; INTRAVENOUS AS NEEDED
Status: CANCELLED | OUTPATIENT
Start: 2023-05-08

## 2023-05-04 RX ORDER — ACETAMINOPHEN 325 MG/1
650 TABLET ORAL ONCE
OUTPATIENT
Start: 2023-05-15

## 2023-05-04 RX ORDER — DIPHENHYDRAMINE HYDROCHLORIDE 50 MG/ML
50 INJECTION INTRAMUSCULAR; INTRAVENOUS AS NEEDED
OUTPATIENT
Start: 2023-05-15

## 2023-05-05 ENCOUNTER — INFUSION (OUTPATIENT)
Dept: ONCOLOGY | Facility: HOSPITAL | Age: 63
End: 2023-05-05
Payer: COMMERCIAL

## 2023-05-05 VITALS
HEART RATE: 98 BPM | BODY MASS INDEX: 23.32 KG/M2 | HEIGHT: 65 IN | SYSTOLIC BLOOD PRESSURE: 101 MMHG | OXYGEN SATURATION: 100 % | RESPIRATION RATE: 17 BRPM | WEIGHT: 140 LBS | TEMPERATURE: 98.4 F | DIASTOLIC BLOOD PRESSURE: 55 MMHG

## 2023-05-05 DIAGNOSIS — D50.9 IRON DEFICIENCY ANEMIA, UNSPECIFIED IRON DEFICIENCY ANEMIA TYPE: ICD-10-CM

## 2023-05-05 PROCEDURE — G0463 HOSPITAL OUTPT CLINIC VISIT: HCPCS

## 2023-05-05 NOTE — PROGRESS NOTES
Patient rescheduled Venofer infusion for Monday at 1 pm. Did not know if premed of Benadryl would make her drowsy and does not have a . Explained would keep her afterwards to monitor but patient said she was going out of town. Delia with Dr. Hastings aware.

## 2023-05-08 ENCOUNTER — INFUSION (OUTPATIENT)
Dept: ONCOLOGY | Facility: HOSPITAL | Age: 63
End: 2023-05-08
Payer: COMMERCIAL

## 2023-05-08 VITALS
OXYGEN SATURATION: 98 % | HEART RATE: 93 BPM | WEIGHT: 142 LBS | RESPIRATION RATE: 18 BRPM | BODY MASS INDEX: 25.16 KG/M2 | HEIGHT: 63 IN | TEMPERATURE: 97.6 F | DIASTOLIC BLOOD PRESSURE: 50 MMHG | SYSTOLIC BLOOD PRESSURE: 100 MMHG

## 2023-05-08 DIAGNOSIS — D50.9 IRON DEFICIENCY ANEMIA, UNSPECIFIED IRON DEFICIENCY ANEMIA TYPE: Primary | ICD-10-CM

## 2023-05-08 PROCEDURE — 96365 THER/PROPH/DIAG IV INF INIT: CPT

## 2023-05-08 PROCEDURE — 96367 TX/PROPH/DG ADDL SEQ IV INF: CPT

## 2023-05-08 PROCEDURE — 25010000002 DIPHENHYDRAMINE PER 50 MG: Performed by: INTERNAL MEDICINE

## 2023-05-08 PROCEDURE — 96375 TX/PRO/DX INJ NEW DRUG ADDON: CPT

## 2023-05-08 PROCEDURE — 25010000002 IRON SUCROSE PER 1 MG: Performed by: INTERNAL MEDICINE

## 2023-05-08 RX ORDER — DIPHENHYDRAMINE HYDROCHLORIDE 50 MG/ML
50 INJECTION INTRAMUSCULAR; INTRAVENOUS AS NEEDED
Status: DISCONTINUED | OUTPATIENT
Start: 2023-05-08 | End: 2023-05-08 | Stop reason: HOSPADM

## 2023-05-08 RX ORDER — FAMOTIDINE 10 MG/ML
20 INJECTION, SOLUTION INTRAVENOUS AS NEEDED
Status: DISCONTINUED | OUTPATIENT
Start: 2023-05-08 | End: 2023-05-08 | Stop reason: HOSPADM

## 2023-05-08 RX ORDER — SODIUM CHLORIDE 9 MG/ML
250 INJECTION, SOLUTION INTRAVENOUS ONCE
Status: COMPLETED | OUTPATIENT
Start: 2023-05-08 | End: 2023-05-08

## 2023-05-08 RX ORDER — ACETAMINOPHEN 325 MG/1
650 TABLET ORAL ONCE
Status: COMPLETED | OUTPATIENT
Start: 2023-05-08 | End: 2023-05-08

## 2023-05-08 RX ADMIN — SODIUM CHLORIDE 250 ML: 9 INJECTION, SOLUTION INTRAVENOUS at 10:07

## 2023-05-08 RX ADMIN — IRON SUCROSE 200 MG: 20 INJECTION, SOLUTION INTRAVENOUS at 10:28

## 2023-05-08 RX ADMIN — ACETAMINOPHEN 650 MG: 325 TABLET, FILM COATED ORAL at 10:07

## 2023-05-08 RX ADMIN — DIPHENHYDRAMINE HYDROCHLORIDE 25 MG: 50 INJECTION, SOLUTION INTRAMUSCULAR; INTRAVENOUS at 10:08

## 2023-05-15 ENCOUNTER — INFUSION (OUTPATIENT)
Dept: ONCOLOGY | Facility: HOSPITAL | Age: 63
End: 2023-05-15
Payer: COMMERCIAL

## 2023-05-15 VITALS
RESPIRATION RATE: 17 BRPM | SYSTOLIC BLOOD PRESSURE: 100 MMHG | OXYGEN SATURATION: 100 % | HEART RATE: 86 BPM | DIASTOLIC BLOOD PRESSURE: 60 MMHG | WEIGHT: 142.2 LBS | HEIGHT: 65 IN | TEMPERATURE: 98 F | BODY MASS INDEX: 23.69 KG/M2

## 2023-05-15 DIAGNOSIS — D50.9 IRON DEFICIENCY ANEMIA, UNSPECIFIED IRON DEFICIENCY ANEMIA TYPE: Primary | ICD-10-CM

## 2023-05-15 PROCEDURE — 25010000002 IRON SUCROSE PER 1 MG: Performed by: INTERNAL MEDICINE

## 2023-05-15 PROCEDURE — 96375 TX/PRO/DX INJ NEW DRUG ADDON: CPT

## 2023-05-15 PROCEDURE — 96367 TX/PROPH/DG ADDL SEQ IV INF: CPT

## 2023-05-15 PROCEDURE — 25010000002 DIPHENHYDRAMINE PER 50 MG: Performed by: INTERNAL MEDICINE

## 2023-05-15 PROCEDURE — 96365 THER/PROPH/DIAG IV INF INIT: CPT

## 2023-05-15 RX ORDER — DIPHENHYDRAMINE HYDROCHLORIDE 50 MG/ML
50 INJECTION INTRAMUSCULAR; INTRAVENOUS AS NEEDED
Status: DISCONTINUED | OUTPATIENT
Start: 2023-05-15 | End: 2023-05-15 | Stop reason: HOSPADM

## 2023-05-15 RX ORDER — ACETAMINOPHEN 325 MG/1
650 TABLET ORAL ONCE
Status: COMPLETED | OUTPATIENT
Start: 2023-05-15 | End: 2023-05-15

## 2023-05-15 RX ORDER — FAMOTIDINE 10 MG/ML
20 INJECTION, SOLUTION INTRAVENOUS AS NEEDED
Status: DISCONTINUED | OUTPATIENT
Start: 2023-05-15 | End: 2023-05-15 | Stop reason: HOSPADM

## 2023-05-15 RX ORDER — SODIUM CHLORIDE 9 MG/ML
250 INJECTION, SOLUTION INTRAVENOUS ONCE
Status: COMPLETED | OUTPATIENT
Start: 2023-05-15 | End: 2023-05-15

## 2023-05-15 RX ADMIN — ACETAMINOPHEN 650 MG: 325 TABLET, FILM COATED ORAL at 10:01

## 2023-05-15 RX ADMIN — SODIUM CHLORIDE 250 ML: 9 INJECTION, SOLUTION INTRAVENOUS at 09:50

## 2023-05-15 RX ADMIN — DIPHENHYDRAMINE HYDROCHLORIDE 25 MG: 50 INJECTION, SOLUTION INTRAMUSCULAR; INTRAVENOUS at 10:01

## 2023-05-15 RX ADMIN — IRON SUCROSE 200 MG: 20 INJECTION, SOLUTION INTRAVENOUS at 10:22

## 2023-05-18 ENCOUNTER — TRANSCRIBE ORDERS (OUTPATIENT)
Dept: ADMINISTRATIVE | Facility: HOSPITAL | Age: 63
End: 2023-05-18
Payer: COMMERCIAL

## 2023-05-18 DIAGNOSIS — E83.110 HEREDITARY HEMOCHROMATOSIS: ICD-10-CM

## 2023-05-18 DIAGNOSIS — E78.5 HYPERLIPIDEMIA, UNSPECIFIED HYPERLIPIDEMIA TYPE: ICD-10-CM

## 2023-05-18 DIAGNOSIS — R73.9 HYPERGLYCEMIA: ICD-10-CM

## 2023-05-18 DIAGNOSIS — E03.9 HYPOTHYROIDISM, UNSPECIFIED TYPE: Primary | ICD-10-CM

## 2023-05-23 ENCOUNTER — TRANSCRIBE ORDERS (OUTPATIENT)
Dept: ADMINISTRATIVE | Facility: HOSPITAL | Age: 63
End: 2023-05-23
Payer: COMMERCIAL

## 2023-05-23 ENCOUNTER — TELEPHONE (OUTPATIENT)
Dept: ONCOLOGY | Facility: CLINIC | Age: 63
End: 2023-05-23
Payer: COMMERCIAL

## 2023-05-23 ENCOUNTER — LAB (OUTPATIENT)
Dept: LAB | Facility: HOSPITAL | Age: 63
End: 2023-05-23
Payer: COMMERCIAL

## 2023-05-23 DIAGNOSIS — K75.81 NONALCOHOLIC STEATOHEPATITIS: Primary | ICD-10-CM

## 2023-05-23 DIAGNOSIS — D50.9 IRON DEFICIENCY ANEMIA, UNSPECIFIED IRON DEFICIENCY ANEMIA TYPE: ICD-10-CM

## 2023-05-23 DIAGNOSIS — C83.07 MARGINAL ZONE LYMPHOMA OF SPLEEN: ICD-10-CM

## 2023-05-23 DIAGNOSIS — E83.110 HEREDITARY HEMOCHROMATOSIS: ICD-10-CM

## 2023-05-23 LAB
ALBUMIN SERPL-MCNC: 4.2 G/DL (ref 3.5–5.2)
ALBUMIN/GLOB SERPL: 2.6 G/DL
ALP SERPL-CCNC: 160 U/L (ref 39–117)
ALT SERPL W P-5'-P-CCNC: 22 U/L (ref 1–33)
ANION GAP SERPL CALCULATED.3IONS-SCNC: 9 MMOL/L (ref 5–15)
ANISOCYTOSIS BLD QL: ABNORMAL
AST SERPL-CCNC: 29 U/L (ref 1–32)
BILIRUB SERPL-MCNC: 2.1 MG/DL (ref 0–1.2)
BUN SERPL-MCNC: 8 MG/DL (ref 8–23)
BUN/CREAT SERPL: 17.8 (ref 7–25)
CALCIUM SPEC-SCNC: 8.9 MG/DL (ref 8.6–10.5)
CHLORIDE SERPL-SCNC: 106 MMOL/L (ref 98–107)
CO2 SERPL-SCNC: 25 MMOL/L (ref 22–29)
CREAT SERPL-MCNC: 0.45 MG/DL (ref 0.57–1)
DEPRECATED RDW RBC AUTO: 66.8 FL (ref 37–54)
EGFRCR SERPLBLD CKD-EPI 2021: 110.3 ML/MIN/1.73
ERYTHROCYTE [DISTWIDTH] IN BLOOD BY AUTOMATED COUNT: 19.8 % (ref 12.3–15.4)
FERRITIN SERPL-MCNC: 428.3 NG/ML (ref 13–150)
GIANT PLATELETS: ABNORMAL
GLOBULIN UR ELPH-MCNC: 1.6 GM/DL
GLUCOSE SERPL-MCNC: 123 MG/DL (ref 65–99)
HCT VFR BLD AUTO: 24.2 % (ref 34–46.6)
HGB BLD-MCNC: 7.4 G/DL (ref 12–15.9)
HYPOCHROMIA BLD QL: ABNORMAL
IRON 24H UR-MRATE: 55 MCG/DL (ref 37–145)
IRON SATN MFR SERPL: 18 % (ref 20–50)
LYMPHOCYTES # BLD MANUAL: 0.28 10*3/MM3 (ref 0.7–3.1)
LYMPHOCYTES NFR BLD MANUAL: 4 % (ref 5–12)
MCH RBC QN AUTO: 28.7 PG (ref 26.6–33)
MCHC RBC AUTO-ENTMCNC: 30.6 G/DL (ref 31.5–35.7)
MCV RBC AUTO: 93.8 FL (ref 79–97)
MONOCYTES # BLD: 0.05 10*3/MM3 (ref 0.1–0.9)
NEUTROPHILS # BLD AUTO: 0.83 10*3/MM3 (ref 1.7–7)
NEUTROPHILS NFR BLD MANUAL: 66 % (ref 42.7–76)
NEUTS BAND NFR BLD MANUAL: 6 % (ref 0–5)
OVALOCYTES BLD QL SMEAR: ABNORMAL
PLATELET # BLD AUTO: 70 10*3/MM3 (ref 140–450)
PMV BLD AUTO: 10.3 FL (ref 6–12)
POIKILOCYTOSIS BLD QL SMEAR: ABNORMAL
POLYCHROMASIA BLD QL SMEAR: ABNORMAL
POTASSIUM SERPL-SCNC: 4 MMOL/L (ref 3.5–5.2)
PROT SERPL-MCNC: 5.8 G/DL (ref 6–8.5)
RBC # BLD AUTO: 2.58 10*6/MM3 (ref 3.77–5.28)
SMALL PLATELETS BLD QL SMEAR: ABNORMAL
SODIUM SERPL-SCNC: 140 MMOL/L (ref 136–145)
TIBC SERPL-MCNC: 298 MCG/DL (ref 298–536)
TRANSFERRIN SERPL-MCNC: 200 MG/DL (ref 200–360)
VARIANT LYMPHS NFR BLD MANUAL: 1 % (ref 0–5)
VARIANT LYMPHS NFR BLD MANUAL: 23 % (ref 19.6–45.3)
WBC MORPH BLD: NORMAL
WBC NRBC COR # BLD: 1.15 10*3/MM3 (ref 3.4–10.8)

## 2023-05-23 PROCEDURE — 85007 BL SMEAR W/DIFF WBC COUNT: CPT

## 2023-05-23 PROCEDURE — 82728 ASSAY OF FERRITIN: CPT

## 2023-05-23 PROCEDURE — 36415 COLL VENOUS BLD VENIPUNCTURE: CPT

## 2023-05-23 PROCEDURE — 85025 COMPLETE CBC W/AUTO DIFF WBC: CPT

## 2023-05-23 PROCEDURE — 84466 ASSAY OF TRANSFERRIN: CPT

## 2023-05-23 PROCEDURE — 80053 COMPREHEN METABOLIC PANEL: CPT

## 2023-05-23 PROCEDURE — 83540 ASSAY OF IRON: CPT

## 2023-05-23 NOTE — TELEPHONE ENCOUNTER
CRITICAL LAB VALUE  Received call from [facility] with CRITICAL LAB VALUE OF:    WBC: 1.15    This information was sent to Dr Hastings for review

## 2023-06-15 ENCOUNTER — TELEPHONE (OUTPATIENT)
Dept: ONCOLOGY | Facility: CLINIC | Age: 63
End: 2023-06-15
Payer: COMMERCIAL

## 2023-06-15 ENCOUNTER — LAB (OUTPATIENT)
Dept: LAB | Facility: HOSPITAL | Age: 63
End: 2023-06-15
Payer: COMMERCIAL

## 2023-06-15 DIAGNOSIS — R73.9 HYPERGLYCEMIA: ICD-10-CM

## 2023-06-15 DIAGNOSIS — E83.110 HEREDITARY HEMOCHROMATOSIS: ICD-10-CM

## 2023-06-15 DIAGNOSIS — E83.119 HEMOCHROMATOSIS, UNSPECIFIED HEMOCHROMATOSIS TYPE: ICD-10-CM

## 2023-06-15 DIAGNOSIS — R60.1 GENERALIZED EDEMA: Primary | ICD-10-CM

## 2023-06-15 DIAGNOSIS — E03.9 HYPOTHYROIDISM, UNSPECIFIED TYPE: ICD-10-CM

## 2023-06-15 DIAGNOSIS — E78.5 HYPERLIPIDEMIA, UNSPECIFIED HYPERLIPIDEMIA TYPE: ICD-10-CM

## 2023-06-15 LAB
ALBUMIN SERPL-MCNC: 3.9 G/DL (ref 3.5–5.2)
ALBUMIN/GLOB SERPL: 2.2 G/DL
ALP SERPL-CCNC: 203 U/L (ref 39–117)
ALT SERPL W P-5'-P-CCNC: 45 U/L (ref 1–33)
ANION GAP SERPL CALCULATED.3IONS-SCNC: 11 MMOL/L (ref 5–15)
ANISOCYTOSIS BLD QL: ABNORMAL
AST SERPL-CCNC: 51 U/L (ref 1–32)
BACTERIA UR QL AUTO: ABNORMAL /HPF
BASOPHILS # BLD MANUAL: 0.01 10*3/MM3 (ref 0–0.2)
BASOPHILS NFR BLD MANUAL: 1 % (ref 0–1.5)
BILIRUB SERPL-MCNC: 2.2 MG/DL (ref 0–1.2)
BILIRUB UR QL STRIP: ABNORMAL
BUN SERPL-MCNC: 5 MG/DL (ref 8–23)
BUN/CREAT SERPL: 10.4 (ref 7–25)
CALCIUM SPEC-SCNC: 8.6 MG/DL (ref 8.6–10.5)
CHLORIDE SERPL-SCNC: 106 MMOL/L (ref 98–107)
CHOLEST SERPL-MCNC: 114 MG/DL (ref 0–200)
CLARITY UR: CLEAR
CO2 SERPL-SCNC: 26 MMOL/L (ref 22–29)
COLOR UR: ABNORMAL
CREAT SERPL-MCNC: 0.48 MG/DL (ref 0.57–1)
DEPRECATED RDW RBC AUTO: 65.5 FL (ref 37–54)
EGFRCR SERPLBLD CKD-EPI 2021: 108.6 ML/MIN/1.73
ELLIPTOCYTES BLD QL SMEAR: ABNORMAL
EOSINOPHIL # BLD MANUAL: 0.01 10*3/MM3 (ref 0–0.4)
EOSINOPHIL NFR BLD MANUAL: 1 % (ref 0.3–6.2)
ERYTHROCYTE [DISTWIDTH] IN BLOOD BY AUTOMATED COUNT: 19.1 % (ref 12.3–15.4)
FERRITIN SERPL-MCNC: 379.2 NG/ML (ref 13–150)
GIANT PLATELETS: ABNORMAL
GLOBULIN UR ELPH-MCNC: 1.8 GM/DL
GLUCOSE SERPL-MCNC: 121 MG/DL (ref 65–99)
GLUCOSE UR STRIP-MCNC: NEGATIVE MG/DL
HBA1C MFR BLD: 4.3 % (ref 4.8–5.6)
HCT VFR BLD AUTO: 23.9 % (ref 34–46.6)
HDLC SERPL-MCNC: 38 MG/DL (ref 40–60)
HGB BLD-MCNC: 7.1 G/DL (ref 12–15.9)
HGB UR QL STRIP.AUTO: NEGATIVE
HYALINE CASTS UR QL AUTO: ABNORMAL /LPF
IRON 24H UR-MRATE: 50 MCG/DL (ref 37–145)
IRON SATN MFR SERPL: 17 % (ref 20–50)
KETONES UR QL STRIP: NEGATIVE
LDH SERPL-CCNC: 374 U/L (ref 135–214)
LDLC SERPL CALC-MCNC: 58 MG/DL (ref 0–100)
LDLC/HDLC SERPL: 1.51 {RATIO}
LEUKOCYTE ESTERASE UR QL STRIP.AUTO: ABNORMAL
LYMPHOCYTES # BLD MANUAL: 0.21 10*3/MM3 (ref 0.7–3.1)
LYMPHOCYTES NFR BLD MANUAL: 4.1 % (ref 5–12)
MCH RBC QN AUTO: 28 PG (ref 26.6–33)
MCHC RBC AUTO-ENTMCNC: 29.7 G/DL (ref 31.5–35.7)
MCV RBC AUTO: 94.1 FL (ref 79–97)
MONOCYTES # BLD: 0.04 10*3/MM3 (ref 0.1–0.9)
NEUTROPHILS # BLD AUTO: 0.63 10*3/MM3 (ref 1.7–7)
NEUTROPHILS NFR BLD MANUAL: 69.4 % (ref 42.7–76)
NEUTS BAND NFR BLD MANUAL: 1 % (ref 0–5)
NITRITE UR QL STRIP: POSITIVE
PH UR STRIP.AUTO: 5.5 [PH] (ref 5–8)
PLATELET # BLD AUTO: 68 10*3/MM3 (ref 140–450)
PMV BLD AUTO: 9.7 FL (ref 6–12)
POIKILOCYTOSIS BLD QL SMEAR: ABNORMAL
POLYCHROMASIA BLD QL SMEAR: ABNORMAL
POTASSIUM SERPL-SCNC: 3.6 MMOL/L (ref 3.5–5.2)
PROT SERPL-MCNC: 5.7 G/DL (ref 6–8.5)
PROT UR QL STRIP: ABNORMAL
RBC # BLD AUTO: 2.54 10*6/MM3 (ref 3.77–5.28)
RBC # UR STRIP: ABNORMAL /HPF
REF LAB TEST METHOD: ABNORMAL
SMALL PLATELETS BLD QL SMEAR: ABNORMAL
SODIUM SERPL-SCNC: 143 MMOL/L (ref 136–145)
SP GR UR STRIP: 1.02 (ref 1–1.03)
SQUAMOUS #/AREA URNS HPF: ABNORMAL /HPF
T3FREE SERPL-MCNC: 2.18 PG/ML (ref 2–4.4)
T4 FREE SERPL-MCNC: 1.14 NG/DL (ref 0.93–1.7)
TIBC SERPL-MCNC: 292 MCG/DL (ref 298–536)
TRANSFERRIN SERPL-MCNC: 196 MG/DL (ref 200–360)
TRIGL SERPL-MCNC: 94 MG/DL (ref 0–150)
TSH SERPL DL<=0.05 MIU/L-ACNC: 1.56 UIU/ML (ref 0.27–4.2)
UROBILINOGEN UR QL STRIP: ABNORMAL
VARIANT LYMPHS NFR BLD MANUAL: 23.5 % (ref 19.6–45.3)
VLDLC SERPL-MCNC: 18 MG/DL (ref 5–40)
WBC # UR STRIP: ABNORMAL /HPF
WBC MORPH BLD: NORMAL
WBC NRBC COR # BLD: 0.89 10*3/MM3 (ref 3.4–10.8)

## 2023-06-15 PROCEDURE — 82728 ASSAY OF FERRITIN: CPT

## 2023-06-15 PROCEDURE — 83540 ASSAY OF IRON: CPT

## 2023-06-15 PROCEDURE — 84439 ASSAY OF FREE THYROXINE: CPT

## 2023-06-15 PROCEDURE — 83036 HEMOGLOBIN GLYCOSYLATED A1C: CPT

## 2023-06-15 PROCEDURE — 81001 URINALYSIS AUTO W/SCOPE: CPT

## 2023-06-15 PROCEDURE — 84481 FREE ASSAY (FT-3): CPT

## 2023-06-15 PROCEDURE — 80050 GENERAL HEALTH PANEL: CPT

## 2023-06-15 PROCEDURE — 87086 URINE CULTURE/COLONY COUNT: CPT

## 2023-06-15 PROCEDURE — 85007 BL SMEAR W/DIFF WBC COUNT: CPT

## 2023-06-15 PROCEDURE — 36415 COLL VENOUS BLD VENIPUNCTURE: CPT

## 2023-06-15 PROCEDURE — 83615 LACTATE (LD) (LDH) ENZYME: CPT

## 2023-06-15 PROCEDURE — 84482 T3 REVERSE: CPT

## 2023-06-15 PROCEDURE — 80061 LIPID PANEL: CPT

## 2023-06-15 PROCEDURE — 84466 ASSAY OF TRANSFERRIN: CPT

## 2023-06-15 NOTE — TELEPHONE ENCOUNTER
Left leg more than right but both are swelling. It is the leg foot and ankle. Scheduled for liver u/s in the morning and would like to have them done tomorrow if possible.

## 2023-06-15 NOTE — TELEPHONE ENCOUNTER
Patient called in and reported that she received iron infusion 3 to 4 weeks ago and about 1 week after she developed swelling in both her legs.

## 2023-06-16 LAB — BACTERIA SPEC AEROBE CULT: NORMAL

## 2023-06-18 LAB — T3REVERSE SERPL-MCNC: 14 NG/DL (ref 9.2–24.1)

## 2023-07-11 PROBLEM — K90.9 INTESTINAL MALABSORPTION, UNSPECIFIED: Status: ACTIVE | Noted: 2023-07-11

## 2023-07-19 ENCOUNTER — TELEPHONE (OUTPATIENT)
Dept: ONCOLOGY | Facility: CLINIC | Age: 63
End: 2023-07-19

## 2023-07-19 NOTE — TELEPHONE ENCOUNTER
Caller: Lucía Wolff    Relationship: Self    Best call back number: [phone]    What is the best time to reach you: ANYTIME    Who are you requesting to speak with (clinical staff, provider, specific staff member): CLINICAL    What was the call regarding: PATIENT HAS SOME QUESTIONS FOR DR SOLIZ REGARDING A SURGERY THAT IS BEING DISCUSSED FOR HER TO BE PERFORMED BY [facility].     PLEASE CALL TO DISCUSS FURTHER.

## 2023-07-31 ENCOUNTER — TELEPHONE (OUTPATIENT)
Dept: SURGERY | Facility: CLINIC | Age: 63
End: 2023-07-31
Payer: COMMERCIAL

## 2023-07-31 NOTE — TELEPHONE ENCOUNTER
Patient called back and advised she had called last week and canceled all upcomming appts and procedures with DR Frank I apologized to patient as I was out all last week and so was Dr Frank. She states she has an appt with [facility] already and will be seeing them. I apologized and offered to get our  for the patient and she declined

## 2023-08-01 ENCOUNTER — TELEPHONE (OUTPATIENT)
Dept: ONCOLOGY | Facility: CLINIC | Age: 63
End: 2023-08-01
Payer: COMMERCIAL

## 2023-08-01 DIAGNOSIS — D50.9 IRON DEFICIENCY ANEMIA, UNSPECIFIED IRON DEFICIENCY ANEMIA TYPE: Primary | ICD-10-CM

## 2023-08-01 DIAGNOSIS — R16.1 SPLENOMEGALY: ICD-10-CM

## 2023-08-03 ENCOUNTER — INFUSION (OUTPATIENT)
Dept: ONCOLOGY | Facility: HOSPITAL | Age: 63
End: 2023-08-03
Payer: COMMERCIAL

## 2023-08-03 ENCOUNTER — LAB (OUTPATIENT)
Dept: LAB | Facility: HOSPITAL | Age: 63
End: 2023-08-03
Payer: COMMERCIAL

## 2023-08-03 VITALS
WEIGHT: 136.6 LBS | DIASTOLIC BLOOD PRESSURE: 44 MMHG | BODY MASS INDEX: 24.2 KG/M2 | SYSTOLIC BLOOD PRESSURE: 93 MMHG | TEMPERATURE: 97.6 F | HEIGHT: 63 IN | HEART RATE: 95 BPM | OXYGEN SATURATION: 99 % | RESPIRATION RATE: 16 BRPM

## 2023-08-03 DIAGNOSIS — D50.8 IRON DEFICIENCY ANEMIA SECONDARY TO INADEQUATE DIETARY IRON INTAKE: ICD-10-CM

## 2023-08-03 DIAGNOSIS — C83.07 MARGINAL ZONE LYMPHOMA OF SPLEEN: Primary | ICD-10-CM

## 2023-08-03 DIAGNOSIS — C83.07 MARGINAL ZONE LYMPHOMA OF SPLEEN: ICD-10-CM

## 2023-08-03 DIAGNOSIS — D50.9 IRON DEFICIENCY ANEMIA, UNSPECIFIED IRON DEFICIENCY ANEMIA TYPE: ICD-10-CM

## 2023-08-03 DIAGNOSIS — R16.1 SPLENOMEGALY: ICD-10-CM

## 2023-08-03 PROBLEM — T80.92XA TRANSFUSION REACTION: Status: ACTIVE | Noted: 2023-08-03

## 2023-08-03 LAB
ABO GROUP BLD: NORMAL
ANISOCYTOSIS BLD QL: ABNORMAL
BASOPHILS # BLD MANUAL: 0.01 10*3/MM3 (ref 0–0.2)
BASOPHILS NFR BLD MANUAL: 1 % (ref 0–1.5)
BLD GP AB SCN SERPL QL: NEGATIVE
DEPRECATED RDW RBC AUTO: 65.4 FL (ref 37–54)
ELLIPTOCYTES BLD QL SMEAR: ABNORMAL
EOSINOPHIL # BLD MANUAL: 0.03 10*3/MM3 (ref 0–0.4)
EOSINOPHIL NFR BLD MANUAL: 2 % (ref 0.3–6.2)
ERYTHROCYTE [DISTWIDTH] IN BLOOD BY AUTOMATED COUNT: 19.5 % (ref 12.3–15.4)
HCT VFR BLD AUTO: 18.2 % (ref 34–46.6)
HGB BLD-MCNC: 5.5 G/DL (ref 12–15.9)
HOLD SPECIMEN: NORMAL
LYMPHOCYTES # BLD MANUAL: 0.32 10*3/MM3 (ref 0.7–3.1)
LYMPHOCYTES NFR BLD MANUAL: 3.1 % (ref 5–12)
MCH RBC QN AUTO: 28.5 PG (ref 26.6–33)
MCHC RBC AUTO-ENTMCNC: 30.2 G/DL (ref 31.5–35.7)
MCV RBC AUTO: 94.3 FL (ref 79–97)
MICROCYTES BLD QL: ABNORMAL
MONOCYTES # BLD: 0.04 10*3/MM3 (ref 0.1–0.9)
NEUTROPHILS # BLD AUTO: 0.9 10*3/MM3 (ref 1.7–7)
NEUTROPHILS NFR BLD MANUAL: 64.3 % (ref 42.7–76)
NEUTS BAND NFR BLD MANUAL: 5.1 % (ref 0–5)
NRBC SPEC MANUAL: 1 /100 WBC (ref 0–0.2)
OVALOCYTES BLD QL SMEAR: ABNORMAL
PLATELET # BLD AUTO: 63 10*3/MM3 (ref 140–450)
PMV BLD AUTO: 10.4 FL (ref 6–12)
POIKILOCYTOSIS BLD QL SMEAR: ABNORMAL
POLYCHROMASIA BLD QL SMEAR: ABNORMAL
RBC # BLD AUTO: 1.93 10*6/MM3 (ref 3.77–5.28)
RH BLD: POSITIVE
SMALL PLATELETS BLD QL SMEAR: ABNORMAL
T&S EXPIRATION DATE: NORMAL
VARIANT LYMPHS NFR BLD MANUAL: 1 % (ref 0–5)
VARIANT LYMPHS NFR BLD MANUAL: 23.5 % (ref 19.6–45.3)
WBC MORPH BLD: NORMAL
WBC NRBC COR # BLD: 1.29 10*3/MM3 (ref 3.4–10.8)

## 2023-08-03 PROCEDURE — 25010000002 DIPHENHYDRAMINE PER 50 MG: Performed by: INTERNAL MEDICINE

## 2023-08-03 PROCEDURE — 86923 COMPATIBILITY TEST ELECTRIC: CPT

## 2023-08-03 PROCEDURE — 86900 BLOOD TYPING SEROLOGIC ABO: CPT

## 2023-08-03 PROCEDURE — 36430 TRANSFUSION BLD/BLD COMPNT: CPT

## 2023-08-03 PROCEDURE — 36415 COLL VENOUS BLD VENIPUNCTURE: CPT

## 2023-08-03 PROCEDURE — P9016 RBC LEUKOCYTES REDUCED: HCPCS

## 2023-08-03 PROCEDURE — 96374 THER/PROPH/DIAG INJ IV PUSH: CPT

## 2023-08-03 PROCEDURE — 86850 RBC ANTIBODY SCREEN: CPT

## 2023-08-03 PROCEDURE — 85025 COMPLETE CBC W/AUTO DIFF WBC: CPT

## 2023-08-03 PROCEDURE — 86901 BLOOD TYPING SEROLOGIC RH(D): CPT

## 2023-08-03 PROCEDURE — 85007 BL SMEAR W/DIFF WBC COUNT: CPT

## 2023-08-03 RX ORDER — SODIUM CHLORIDE 9 MG/ML
250 INJECTION, SOLUTION INTRAVENOUS AS NEEDED
Status: CANCELLED | OUTPATIENT
Start: 2023-08-03

## 2023-08-03 RX ORDER — FUROSEMIDE 10 MG/ML
20 INJECTION INTRAMUSCULAR; INTRAVENOUS ONCE
Status: CANCELLED | OUTPATIENT
Start: 2023-08-03 | End: 2023-08-03

## 2023-08-03 RX ORDER — SODIUM CHLORIDE 9 MG/ML
250 INJECTION, SOLUTION INTRAVENOUS AS NEEDED
Status: DISCONTINUED | OUTPATIENT
Start: 2023-08-03 | End: 2023-08-03 | Stop reason: HOSPADM

## 2023-08-03 RX ORDER — DIPHENHYDRAMINE HYDROCHLORIDE 50 MG/ML
50 INJECTION INTRAMUSCULAR; INTRAVENOUS ONCE
OUTPATIENT
Start: 2023-08-04 | End: 2023-08-04

## 2023-08-03 RX ORDER — ACETAMINOPHEN 325 MG/1
650 TABLET ORAL ONCE
Status: COMPLETED | OUTPATIENT
Start: 2023-08-03 | End: 2023-08-03

## 2023-08-03 RX ORDER — FUROSEMIDE 10 MG/ML
20 INJECTION INTRAMUSCULAR; INTRAVENOUS ONCE
Status: DISCONTINUED | OUTPATIENT
Start: 2023-08-03 | End: 2023-08-03 | Stop reason: HOSPADM

## 2023-08-03 RX ORDER — DIPHENHYDRAMINE HYDROCHLORIDE 50 MG/ML
25 INJECTION INTRAMUSCULAR; INTRAVENOUS ONCE
Status: COMPLETED | OUTPATIENT
Start: 2023-08-03 | End: 2023-08-03

## 2023-08-03 RX ORDER — ACETAMINOPHEN 325 MG/1
650 TABLET ORAL ONCE
Status: CANCELLED | OUTPATIENT
Start: 2023-08-03 | End: 2023-08-03

## 2023-08-03 RX ORDER — DIPHENHYDRAMINE HYDROCHLORIDE 50 MG/ML
25 INJECTION INTRAMUSCULAR; INTRAVENOUS ONCE
Status: CANCELLED | OUTPATIENT
Start: 2023-08-03 | End: 2023-08-03

## 2023-08-03 RX ADMIN — DIPHENHYDRAMINE HYDROCHLORIDE 25 MG: 50 INJECTION, SOLUTION INTRAMUSCULAR; INTRAVENOUS at 12:12

## 2023-08-03 RX ADMIN — ACETAMINOPHEN 650 MG: 325 TABLET ORAL at 12:12

## 2023-08-04 ENCOUNTER — INFUSION (OUTPATIENT)
Dept: ONCOLOGY | Facility: HOSPITAL | Age: 63
End: 2023-08-04
Payer: COMMERCIAL

## 2023-08-04 VITALS
HEART RATE: 75 BPM | HEIGHT: 63 IN | BODY MASS INDEX: 23.88 KG/M2 | WEIGHT: 134.8 LBS | OXYGEN SATURATION: 100 % | SYSTOLIC BLOOD PRESSURE: 93 MMHG | DIASTOLIC BLOOD PRESSURE: 50 MMHG | RESPIRATION RATE: 16 BRPM | TEMPERATURE: 97.8 F

## 2023-08-04 DIAGNOSIS — D50.9 IRON DEFICIENCY ANEMIA, UNSPECIFIED IRON DEFICIENCY ANEMIA TYPE: ICD-10-CM

## 2023-08-04 DIAGNOSIS — D50.9 IRON DEFICIENCY ANEMIA, UNSPECIFIED IRON DEFICIENCY ANEMIA TYPE: Primary | ICD-10-CM

## 2023-08-04 PROCEDURE — 86900 BLOOD TYPING SEROLOGIC ABO: CPT

## 2023-08-04 PROCEDURE — 86923 COMPATIBILITY TEST ELECTRIC: CPT

## 2023-08-04 PROCEDURE — 25010000002 DIPHENHYDRAMINE PER 50 MG: Performed by: INTERNAL MEDICINE

## 2023-08-04 PROCEDURE — P9016 RBC LEUKOCYTES REDUCED: HCPCS

## 2023-08-04 PROCEDURE — 36430 TRANSFUSION BLD/BLD COMPNT: CPT

## 2023-08-04 PROCEDURE — 86901 BLOOD TYPING SEROLOGIC RH(D): CPT

## 2023-08-04 PROCEDURE — 96374 THER/PROPH/DIAG INJ IV PUSH: CPT

## 2023-08-04 RX ORDER — FUROSEMIDE 10 MG/ML
20 INJECTION INTRAMUSCULAR; INTRAVENOUS ONCE
Status: CANCELLED | OUTPATIENT
Start: 2023-08-04 | End: 2023-08-04

## 2023-08-04 RX ORDER — FUROSEMIDE 10 MG/ML
20 INJECTION INTRAMUSCULAR; INTRAVENOUS ONCE
Status: DISCONTINUED | OUTPATIENT
Start: 2023-08-04 | End: 2023-08-04 | Stop reason: HOSPADM

## 2023-08-04 RX ORDER — DIPHENHYDRAMINE HYDROCHLORIDE 50 MG/ML
25 INJECTION INTRAMUSCULAR; INTRAVENOUS ONCE
Status: COMPLETED | OUTPATIENT
Start: 2023-08-04 | End: 2023-08-04

## 2023-08-04 RX ORDER — DIPHENHYDRAMINE HYDROCHLORIDE 50 MG/ML
25 INJECTION INTRAMUSCULAR; INTRAVENOUS ONCE
Status: CANCELLED | OUTPATIENT
Start: 2023-08-04 | End: 2023-08-04

## 2023-08-04 RX ORDER — FAMOTIDINE 10 MG/ML
20 INJECTION, SOLUTION INTRAVENOUS ONCE
Status: CANCELLED | OUTPATIENT
Start: 2023-08-04 | End: 2023-08-04

## 2023-08-04 RX ORDER — FAMOTIDINE 10 MG/ML
20 INJECTION, SOLUTION INTRAVENOUS ONCE
Status: DISCONTINUED | OUTPATIENT
Start: 2023-08-04 | End: 2023-08-04 | Stop reason: HOSPADM

## 2023-08-04 RX ORDER — ACETAMINOPHEN 325 MG/1
650 TABLET ORAL ONCE
Status: CANCELLED | OUTPATIENT
Start: 2023-08-04 | End: 2023-08-04

## 2023-08-04 RX ORDER — SODIUM CHLORIDE 9 MG/ML
250 INJECTION, SOLUTION INTRAVENOUS AS NEEDED
Status: DISCONTINUED | OUTPATIENT
Start: 2023-08-04 | End: 2023-08-04 | Stop reason: HOSPADM

## 2023-08-04 RX ORDER — ACETAMINOPHEN 325 MG/1
650 TABLET ORAL ONCE
Status: COMPLETED | OUTPATIENT
Start: 2023-08-04 | End: 2023-08-04

## 2023-08-04 RX ORDER — SODIUM CHLORIDE 9 MG/ML
250 INJECTION, SOLUTION INTRAVENOUS AS NEEDED
Status: CANCELLED | OUTPATIENT
Start: 2023-08-04

## 2023-08-04 RX ADMIN — ACETAMINOPHEN 650 MG: 325 TABLET ORAL at 08:05

## 2023-08-04 RX ADMIN — DIPHENHYDRAMINE HYDROCHLORIDE 25 MG: 50 INJECTION, SOLUTION INTRAMUSCULAR; INTRAVENOUS at 08:15

## 2023-08-05 LAB
BH BB BLOOD EXPIRATION DATE: NORMAL
BH BB BLOOD TYPE BARCODE: 5100
BH BB DISPENSE STATUS: NORMAL
BH BB PRODUCT CODE: NORMAL
BH BB UNIT NUMBER: NORMAL
CROSSMATCH INTERPRETATION: NORMAL
UNIT  ABO: NORMAL
UNIT  RH: NORMAL

## 2023-08-07 LAB
BH BB BLOOD EXPIRATION DATE: NORMAL
BH BB BLOOD EXPIRATION DATE: NORMAL
BH BB BLOOD TYPE BARCODE: 5100
BH BB BLOOD TYPE BARCODE: 5100
BH BB DISPENSE STATUS: NORMAL
BH BB DISPENSE STATUS: NORMAL
BH BB PRODUCT CODE: NORMAL
BH BB PRODUCT CODE: NORMAL
BH BB UNIT NUMBER: NORMAL
BH BB UNIT NUMBER: NORMAL
CROSSMATCH INTERPRETATION: NORMAL
CROSSMATCH INTERPRETATION: NORMAL
UNIT  ABO: NORMAL
UNIT  ABO: NORMAL
UNIT  RH: NORMAL
UNIT  RH: NORMAL

## 2023-08-10 ENCOUNTER — TELEPHONE (OUTPATIENT)
Dept: ONCOLOGY | Facility: CLINIC | Age: 63
End: 2023-08-10
Payer: COMMERCIAL

## 2023-08-10 ENCOUNTER — LAB (OUTPATIENT)
Dept: LAB | Facility: HOSPITAL | Age: 63
End: 2023-08-10
Payer: COMMERCIAL

## 2023-08-10 ENCOUNTER — INFUSION (OUTPATIENT)
Dept: ONCOLOGY | Facility: HOSPITAL | Age: 63
End: 2023-08-10
Payer: COMMERCIAL

## 2023-08-10 VITALS
HEART RATE: 79 BPM | SYSTOLIC BLOOD PRESSURE: 104 MMHG | BODY MASS INDEX: 23.21 KG/M2 | TEMPERATURE: 98.1 F | DIASTOLIC BLOOD PRESSURE: 60 MMHG | WEIGHT: 131 LBS | RESPIRATION RATE: 16 BRPM | OXYGEN SATURATION: 99 % | HEIGHT: 63 IN

## 2023-08-10 DIAGNOSIS — R16.1 SPLENOMEGALY: ICD-10-CM

## 2023-08-10 DIAGNOSIS — D50.9 IRON DEFICIENCY ANEMIA, UNSPECIFIED IRON DEFICIENCY ANEMIA TYPE: ICD-10-CM

## 2023-08-10 DIAGNOSIS — R16.1 SPLENOMEGALY: Primary | ICD-10-CM

## 2023-08-10 DIAGNOSIS — D50.9 IRON DEFICIENCY ANEMIA, UNSPECIFIED IRON DEFICIENCY ANEMIA TYPE: Primary | ICD-10-CM

## 2023-08-10 LAB
ABO GROUP BLD: NORMAL
ANISOCYTOSIS BLD QL: ABNORMAL
BLD GP AB SCN SERPL QL: NEGATIVE
DEPRECATED RDW RBC AUTO: 70.3 FL (ref 37–54)
EOSINOPHIL # BLD MANUAL: 0.02 10*3/MM3 (ref 0–0.4)
EOSINOPHIL NFR BLD MANUAL: 1 % (ref 0.3–6.2)
ERYTHROCYTE [DISTWIDTH] IN BLOOD BY AUTOMATED COUNT: 21.6 % (ref 12.3–15.4)
GIANT PLATELETS: ABNORMAL
HCT VFR BLD AUTO: 20.4 % (ref 34–46.6)
HGB BLD-MCNC: 6.1 G/DL (ref 12–15.9)
LYMPHOCYTES # BLD MANUAL: 0.55 10*3/MM3 (ref 0.7–3.1)
LYMPHOCYTES NFR BLD MANUAL: 1 % (ref 5–12)
MCH RBC QN AUTO: 28.5 PG (ref 26.6–33)
MCHC RBC AUTO-ENTMCNC: 29.9 G/DL (ref 31.5–35.7)
MCV RBC AUTO: 95.3 FL (ref 79–97)
METAMYELOCYTES NFR BLD MANUAL: 1 % (ref 0–0)
MONOCYTES # BLD: 0.02 10*3/MM3 (ref 0.1–0.9)
NEUTROPHILS # BLD AUTO: 1.02 10*3/MM3 (ref 1.7–7)
NEUTROPHILS NFR BLD MANUAL: 63 % (ref 42.7–76)
OVALOCYTES BLD QL SMEAR: ABNORMAL
PLATELET # BLD AUTO: 77 10*3/MM3 (ref 140–450)
PMV BLD AUTO: 10.3 FL (ref 6–12)
POIKILOCYTOSIS BLD QL SMEAR: ABNORMAL
POLYCHROMASIA BLD QL SMEAR: ABNORMAL
RBC # BLD AUTO: 2.14 10*6/MM3 (ref 3.77–5.28)
RH BLD: POSITIVE
SMALL PLATELETS BLD QL SMEAR: ABNORMAL
T&S EXPIRATION DATE: NORMAL
VARIANT LYMPHS NFR BLD MANUAL: 2 % (ref 0–5)
VARIANT LYMPHS NFR BLD MANUAL: 32 % (ref 19.6–45.3)
WBC MORPH BLD: NORMAL
WBC NRBC COR # BLD: 1.62 10*3/MM3 (ref 3.4–10.8)

## 2023-08-10 PROCEDURE — 86901 BLOOD TYPING SEROLOGIC RH(D): CPT

## 2023-08-10 PROCEDURE — 85025 COMPLETE CBC W/AUTO DIFF WBC: CPT

## 2023-08-10 PROCEDURE — 36415 COLL VENOUS BLD VENIPUNCTURE: CPT

## 2023-08-10 PROCEDURE — 85007 BL SMEAR W/DIFF WBC COUNT: CPT

## 2023-08-10 PROCEDURE — 25010000002 DIPHENHYDRAMINE PER 50 MG: Performed by: INTERNAL MEDICINE

## 2023-08-10 PROCEDURE — 86900 BLOOD TYPING SEROLOGIC ABO: CPT

## 2023-08-10 PROCEDURE — 86923 COMPATIBILITY TEST ELECTRIC: CPT

## 2023-08-10 PROCEDURE — 96374 THER/PROPH/DIAG INJ IV PUSH: CPT

## 2023-08-10 PROCEDURE — 86850 RBC ANTIBODY SCREEN: CPT

## 2023-08-10 PROCEDURE — P9016 RBC LEUKOCYTES REDUCED: HCPCS

## 2023-08-10 PROCEDURE — 36430 TRANSFUSION BLD/BLD COMPNT: CPT

## 2023-08-10 RX ORDER — DIPHENHYDRAMINE HYDROCHLORIDE 50 MG/ML
25 INJECTION INTRAMUSCULAR; INTRAVENOUS ONCE
Status: COMPLETED | OUTPATIENT
Start: 2023-08-10 | End: 2023-08-10

## 2023-08-10 RX ORDER — FUROSEMIDE 10 MG/ML
20 INJECTION INTRAMUSCULAR; INTRAVENOUS ONCE
Status: DISCONTINUED | OUTPATIENT
Start: 2023-08-10 | End: 2023-08-10 | Stop reason: HOSPADM

## 2023-08-10 RX ORDER — DIPHENHYDRAMINE HYDROCHLORIDE 50 MG/ML
25 INJECTION INTRAMUSCULAR; INTRAVENOUS ONCE
Status: CANCELLED | OUTPATIENT
Start: 2023-08-10 | End: 2023-08-10

## 2023-08-10 RX ORDER — ACETAMINOPHEN 325 MG/1
650 TABLET ORAL ONCE
Status: COMPLETED | OUTPATIENT
Start: 2023-08-10 | End: 2023-08-10

## 2023-08-10 RX ORDER — FUROSEMIDE 10 MG/ML
20 INJECTION INTRAMUSCULAR; INTRAVENOUS ONCE
OUTPATIENT
Start: 2023-08-10 | End: 2023-08-10

## 2023-08-10 RX ORDER — FUROSEMIDE 10 MG/ML
20 INJECTION INTRAMUSCULAR; INTRAVENOUS ONCE
Status: CANCELLED | OUTPATIENT
Start: 2023-08-10 | End: 2023-08-10

## 2023-08-10 RX ORDER — SODIUM CHLORIDE 9 MG/ML
250 INJECTION, SOLUTION INTRAVENOUS AS NEEDED
OUTPATIENT
Start: 2023-08-10

## 2023-08-10 RX ORDER — SODIUM CHLORIDE 9 MG/ML
250 INJECTION, SOLUTION INTRAVENOUS AS NEEDED
Status: DISCONTINUED | OUTPATIENT
Start: 2023-08-10 | End: 2023-08-10 | Stop reason: HOSPADM

## 2023-08-10 RX ORDER — ACETAMINOPHEN 325 MG/1
650 TABLET ORAL ONCE
OUTPATIENT
Start: 2023-08-10 | End: 2023-08-10

## 2023-08-10 RX ORDER — ACETAMINOPHEN 325 MG/1
650 TABLET ORAL ONCE
Status: CANCELLED | OUTPATIENT
Start: 2023-08-10 | End: 2023-08-10

## 2023-08-10 RX ORDER — DIPHENHYDRAMINE HYDROCHLORIDE 50 MG/ML
25 INJECTION INTRAMUSCULAR; INTRAVENOUS ONCE
OUTPATIENT
Start: 2023-08-10 | End: 2023-08-10

## 2023-08-10 RX ORDER — SODIUM CHLORIDE 9 MG/ML
250 INJECTION, SOLUTION INTRAVENOUS AS NEEDED
Status: CANCELLED | OUTPATIENT
Start: 2023-08-10

## 2023-08-10 RX ADMIN — DIPHENHYDRAMINE HYDROCHLORIDE 25 MG: 50 INJECTION, SOLUTION INTRAMUSCULAR; INTRAVENOUS at 11:31

## 2023-08-10 RX ADMIN — ACETAMINOPHEN 650 MG: 325 TABLET, FILM COATED ORAL at 11:31

## 2023-08-10 NOTE — TELEPHONE ENCOUNTER
CRITICAL LAB VALUES  Received call from [facility] with CRITICAL LAB VALUE OF:    WBC: 1.62  HGB: 6.1  Hct: 20.4    This information was sent to Dr Hastings for review

## 2023-08-10 NOTE — TELEPHONE ENCOUNTER
Per Dr Hastings's instructions:   Order 2 units of PRBC  Transfusion was zhane for today 1 unit PRBC 8/10/23 and 2nd unit tomorrow Friday 8/11/23

## 2023-08-11 ENCOUNTER — APPOINTMENT (OUTPATIENT)
Dept: ONCOLOGY | Facility: HOSPITAL | Age: 63
End: 2023-08-11
Payer: COMMERCIAL

## 2023-10-02 ENCOUNTER — TELEPHONE (OUTPATIENT)
Dept: ONCOLOGY | Facility: CLINIC | Age: 63
End: 2023-10-02
Payer: COMMERCIAL

## 2023-10-02 NOTE — TELEPHONE ENCOUNTER
Paul with Dr. Pena's office at [facility] called. She was wanting to make sure we received their office notes from last week, which looks like we have it's in media. She said they are recommending 4 doses of rituxan and we're the local one for her. Her call back is [phone].

## 2023-10-03 ENCOUNTER — TELEPHONE (OUTPATIENT)
Dept: ONCOLOGY | Facility: CLINIC | Age: 63
End: 2023-10-03
Payer: COMMERCIAL

## 2023-10-03 NOTE — TELEPHONE ENCOUNTER
Received call from Paul/Nurse with Dr Pena's office. She calls regarding mutual patient Lucía Wolff.  Per Dr Jimenez recommendations patient is to return to Dr Hastings and receive her chemotherapy locally   Rituxan x 4 doses and then return back to Dr Pena for PET Scan and biopsy after completion, she is sending Dr Jimenez notes and recommendations for Dr Hastings to review  PHONE: [phone]

## 2023-10-04 NOTE — PROGRESS NOTES
[facility]  [address]  [place] [address] [phone]    Patient Name: Lucía Wolff  Encounter Date: 10/06/2023  YOB: 1960  Patient Number: [id number]      REASON FOR FOLLOW-UP: Lucía Wolff is a pleasant 63 y.o. 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  Her bone marrow showed B-cell lymphoma. She is also seen for splenomegaly from B-cell lymphoma.  She is seen to initiate Rituxan weekly times 4. She is seen with her friend. History from the patient.  History is reliable.            Oncology/Hematology History Overview Note   DIAGNOSTIC ABNORMALITIES:  CBC 01/17/2022. Remarkable for platelet 97. Bilirubin 1.9.   CT neck 01/19/2022.  No cervical lymphadenopathy by size criteria.  Asymmetric appearance of the left submandibular gland. Correlation for sialoadenitis recommended. If indicated, dedicated ultrasound may be  Performed.  CT chest 01/19/2022. No intrathoracic lymphadenopathy. No axillary lymphadenopathy  CT abdomen and pelvis 01/19/2022. Massive splenomegaly.  Splenic artery aneurysm, 1.5 cm.  No additional abdominal or pelvic lymphadenopathy.  PET 01/25/2022. No hypermetabolic lymphadenopathy or tumor. No scintigraphic evidence of malignancy. . Persistent prominent splenomegaly.  Recommendations from [place] on 05/12/2022.  The palpable splenomegaly in the absence of additional findings to suspect avid adenopathy and liver disease suggested the patient has hypersplenism.  Other differential include nutritional deficiencies or cyclical neutropenia.  The enlarging spleen is not significantly PET avid therefore splenectomy would not be recommended at this time.  Other reason is infection with postsplenectomy state. Would recommend splenectomy if the patient continues to develop new symptoms for example losing another 10 pounds in 6 months, recurrent  "major infections, transfusion dependent cytopenias or splenic infarcts.   JAK2 was negative, normal copper at 123.2, B12 at 748, reticulocyte 4%, negative hepatitis C antibody, uric acid 8, WBC 3.8, hemoglobin 12.4, MCV 89, platelet 116, bilirubin 2.5, , and ferritin 262 at [place].           PREVIOUS INTERVENTIONS:  Ferrous sulfate 4/11/2023 to 4/24/2023, intolerant, severe constipation and GI upset.  Venofer 200 mg 5/8/2023 and 5/15/2023.     Marginal zone lymphoma of spleen   3/28/2022 Initial Diagnosis    Marginal zone lymphoma of spleen (HCC)         PAST MEDICAL HISTORY:  ALLERGIES:  No Known Allergies  CURRENT MEDICATIONS:  Outpatient Encounter Medications as of 10/6/2023   Medication Sig Dispense Refill    acetaminophen (TYLENOL) 325 MG tablet Take 2 tablets by mouth Every 6 (Six) Hours As Needed for Mild Pain.      calcium carbonate (OS-MITRA) 600 MG tablet Take 1 tablet by mouth 2 (Two) Times a Day With Meals.      Cholecalciferol (vitamin D3) 125 MCG (5000 UT) capsule capsule Take 1 capsule by mouth Daily.      cyanocobalamin 1000 MCG/ML injection INJECT 1 ML EVERY 28 DAYS. 12 mL 0    Dextromethorphan-guaiFENesin (MUCINEX DM PO) Take 1 tablet by mouth 2 (Two) Times a Day As Needed.      Eliquis 5 MG tablet tablet Take  by mouth 2 (Two) Times a Day.      folic acid (FOLVITE) 1 MG tablet Take 1 tablet by mouth Daily.      gabapentin (NEURONTIN) 300 MG capsule Take 1 capsule 3 times a day by oral route for 8 days.      Multiple Vitamins-Minerals (MULTIVITAMIN WITH MINERALS) tablet tablet Take 1 tablet by mouth Daily.      Omega-3 300 MG capsule Take 4 capsules by mouth Daily.      omeprazole (priLOSEC) 20 MG capsule       pantoprazole (PROTONIX) 40 MG EC tablet Take 1 tablet every day by oral route.      Syringe 25G X 1\" 3 ML misc USE 1 SYRINGE PER B12 INJECTION 12 each 0    Thyroid 65 MG PO tablet Take 1 tablet by mouth Daily.      VIT B12-METHIONINE-INOS-CHOL IM Inject 1 mL into the appropriate " muscle as directed by prescriber Every 30 (Thirty) Days. Has not taken since 2020 at least       [DISCONTINUED] ferrous sulfate 325 (65 FE) MG tablet Take 1 tablet by mouth Daily With Breakfast. 60 tablet 2    [DISCONTINUED] Semaglutide,0.25 or 0.5MG/DOS, (OZEMPIC) 2 MG/1.5ML solution pen-injector Inject 0.25 mg under the skin into the appropriate area as directed 1 (One) Time Per Week.       No facility-administered encounter medications on file as of 10/6/2023.     ADULT ILLNESSES:  Patient Active Problem List   Diagnosis Code    Heart murmur R01.1    Thrombocytopenia D69.6    Marginal zone lymphoma of spleen C83.07    Splenomegaly R16.1    Pancytopenia, acquired D61.818    Iron deficiency anemia D50.9    Intestinal malabsorption, unspecified K90.9    Transfusion reaction T80.92XA     SURGERIES:  Past Surgical History:   Procedure Laterality Date    CHOLECYSTECTOMY      HYSTERECTOMY  2003    PARTIAL    LIVER BIOPSY  08/12/2019     HEALTH MAINTENANCE ITEMS:  Health Maintenance Due   Topic Date Due    URINE MICROALBUMIN  Never done    COLORECTAL CANCER SCREENING  Never done    Pneumococcal Vaccine 0-64 (1 - PCV) Never done    TDAP/TD VACCINES (1 - Tdap) Never done    ZOSTER VACCINE (1 of 2) Never done    ANNUAL PHYSICAL  Never done    DIABETIC FOOT EXAM  Never done    PAP SMEAR  Never done    DIABETIC EYE EXAM  Never done    MAMMOGRAM  11/01/2020    Hepatitis B (1 of 3 - Risk 3-dose series) Never done    INFLUENZA VACCINE  08/01/2023    COVID-19 Vaccine (4 - 2023-24 season) 09/01/2023       <no information>  Last Completed Colonoscopy       This patient has no relevant Health Maintenance data.          There is no immunization history for the selected administration types on file for this patient.    Last Completed Mammogram       This patient has no relevant Health Maintenance data.              FAMILY HISTORY:  Family History   Problem Relation Age of Onset    Alzheimer's disease Mother     Diabetes Father      "Stroke Father     Cardiomyopathy Father     Heart attack Father     Heart disease Father     Hypertension Father     Cancer Brother         She is unclear of the kind of cancer    Diabetes Brother     Diabetes Brother     Hypertension Brother     No Known Problems Brother     No Known Problems Son     No Known Problems Son     No Known Problems Daughter      SOCIAL HISTORY:  Social History     Socioeconomic History    Marital status:    Tobacco Use    Smoking status: Never    Smokeless tobacco: Never    Tobacco comments:     has tried it but did not keep smoking   Vaping Use    Vaping Use: Never used   Substance and Sexual Activity    Alcohol use: Never    Drug use: No    Sexual activity: Defer       REVIEW OF SYSTEMS:    Review of Systems   Constitutional:  Negative for chills, fatigue and fever.        \"I do feel good.\"     HENT:  Negative for congestion and trouble swallowing.    Eyes:  Negative for redness and visual disturbance.   Respiratory:  Negative for cough and shortness of breath.    Cardiovascular:  Negative for chest pain and leg swelling.   Gastrointestinal:  Negative for abdominal pain, nausea and vomiting.   Endocrine: Negative for polydipsia and polyphagia.   Genitourinary:  Negative for difficulty urinating, dysuria and flank pain.   Musculoskeletal:  Negative for gait problem and myalgias.   Skin:  Negative for pallor.   Allergic/Immunologic: Negative for food allergies.   Neurological:  Negative for dizziness, speech difficulty and weakness.   Hematological:  Negative for adenopathy. Does not bruise/bleed easily.   Psychiatric/Behavioral:  Negative for agitation, confusion and hallucinations.          VITAL SIGNS: /56   Pulse 74   Temp 98.1 °F (36.7 °C)   Resp 18   Ht 160 cm (62.99\")   Wt 59.9 kg (132 lb)   SpO2 98%   BMI 23.39 kg/m²  Lost 2 pounds. \"I got down to 112.\"  Pain Score    10/06/23 1127   PainSc: 0-No pain       PHYSICAL EXAMINATION:     Physical Exam  Vitals " reviewed.   Constitutional:       General: She is not in acute distress.  HENT:      Head: Normocephalic and atraumatic.   Eyes:      General: No scleral icterus.  Cardiovascular:      Rate and Rhythm: Normal rate.   Pulmonary:      Effort: No respiratory distress.      Breath sounds: No wheezing.   Abdominal:      General: Bowel sounds are normal.      Palpations: Abdomen is soft.      Tenderness: There is no abdominal tenderness.      Comments: Laparotomy scar.    Musculoskeletal:         General: No swelling.      Cervical back: Neck supple.   Skin:     General: Skin is warm.      Coloration: Skin is not pale.   Neurological:      Mental Status: She is alert and oriented to person, place, and time.   Psychiatric:         Mood and Affect: Mood normal.         Behavior: Behavior normal.         Thought Content: Thought content normal.         Judgment: Judgment normal.       LABS    Lab Results - Last 18 Months   Lab Units 10/06/23  1059 08/10/23  0953 08/03/23  1020 07/13/23  1309 07/11/23  1021 06/15/23  0745 05/23/23  1036 04/11/23  1348 02/28/23  0852 01/10/23  1344 11/22/22  0937 10/11/22  1258 08/03/22  1036 06/22/22  1108 05/05/22  1024 05/05/22  1024   HEMOGLOBIN g/dL 13.9 6.1* 5.5* 8.3* 6.6* 7.1* 7.4* 7.2* 9.2* 12.7 10.2* 10.1* 8.6* 9.9*  --  10.7*   HEMATOCRIT % 45.6 20.4* 18.2* 27.5* 22.8* 23.9* 24.2* 23.8* 29.2* 38.0 31.4* 31.7* 27.4* 29.6*  --  33.6*   MCV fL 91.9 95.3 94.3 92.9 96.6 94.1 93.8 91.9 91.3 86.6 89.2 90.1 90.4 88.4  --  89.6   WBC 10*3/mm3 9.14 1.62* 1.29* 1.53* 1.11* 0.89* 1.15* 1.17* 1.73* 4.07 2.58* 2.29* 1.88* 2.45*  --  2.64*   RDW % 16.3* 21.6* 19.5* 18.8* 19.8* 19.1* 19.8* 19.2* 18.6* 16.2* 16.1* 17.6* 17.3* 18.3*  --  17.1*   MPV fL 12.3* 10.3 10.4 9.7 10.9 9.7 10.3 9.7 10.3 10.3 9.7 9.6 9.9 10.6  --  10.0   PLATELETS 10*3/mm3 191 77* 63* 69* 67* 68* 70* 74* 76* 112* 95* 90* 73* 88*  --  85*   IMM GRAN % %  --   --   --   --   --   --   --   --  1.2*  --  0.4 0.4 0.5  --   --   --     NEUTROS ABS 10*3/mm3 2.67 1.02* 0.90* 1.07* 0.77* 0.63* 0.83* 0.96* 1.20* 2.77 1.78 1.54* 1.16* 1.80   < > 1.93   LYMPHS ABS 10*3/mm3  --   --   --   --   --   --   --   --  0.36* 1.02 0.53* 0.59* 0.51*  --   --  0.50*   MONOS ABS 10*3/mm3  --   --   --   --   --   --   --   --  0.12 0.22 0.22 0.11 0.16  --   --  0.16   EOS ABS 10*3/mm3 0.38 0.02 0.03  --  0.01 0.01  --  0.07 0.02 0.03 0.03 0.03 0.03 0.02   < > 0.03   BASOS ABS 10*3/mm3  --   --  0.01  --  0.03 0.01  --   --  0.01 0.01 0.01 0.01 0.01  --   --  0.01   IMMATURE GRANS (ABS) 10*3/mm3  --   --   --   --   --   --   --   --  0.02  --  0.01 0.01 0.01  --   --   --    NRBC /100 WBC  --   --  1.0*  --  1.0*  --   --   --  0.0  --  0.0 0.0 0.0  --   --   --    NEUTROPHIL % % 28.1* 63.0 64.3 69.0 67.0 69.4 66.0 82.4*  --   --   --   --   --  70.4   < >  --    MONOCYTES % % 15.6* 1.0* 3.1* 5.0 2.0* 4.1* 4.0*  --   --   --   --   --   --  3.1*   < >  --    BASOPHIL % %  --   --  1.0  --  3.0* 1.0  --   --   --   --   --   --   --   --   --   --    ATYP LYMPH % % 20.8* 2.0 1.0 2.0  --   --  1.0  --   --   --   --   --   --  3.1  --   --    ANISOCYTOSIS  Slight/1+ Mod/2+ Mod/2+ Slight/1+ Mod/2+ Slight/1+ Slight/1+ Large/3+  --   --   --   --   --  Mod/2+   < >  --    GIANT PLT  Large/3+ Slight/1+  --   --  Mod/2+ Slight/1+ Slight/1+  --   --   --   --   --   --  Slight/1+  --   --     < > = values in this interval not displayed.       Lab Results - Last 18 Months   Lab Units 10/06/23  1059 08/23/23  0455 08/22/23  0458 08/21/23  0251 08/20/23  0025 08/19/23  0619 08/11/23  0848 07/11/23  1021 06/15/23  0745 05/23/23  1036 04/11/23  1348 02/28/23  0852   GLUCOSE mg/dL 314*  --   --   --   --   --   --  171* 121* 123* 131* 153*   SODIUM mmol/L 137 138 132* 134* 136 136   < > 141 143 140 142 142   POTASSIUM mmol/L 4.5 3.4 3.7 4.2 3.5 4.2   < > 4.4 3.6 4.0 4.0 3.6   TOTAL CO2 mmol/L  --  23 19* 22* 26 25   < >  --   --   --   --   --    CO2 mmol/L 27.0  --   --   --    --   --   --  26.0 26.0 25.0 25.0 27.0   CHLORIDE mmol/L 101 104 107 102 102 101   < > 104 106 106 106 106   ANION GAP mmol/L 9.0 11 6 10 8 10   < > 11.0 11.0 9.0 11.0 9.0   CREATININE mg/dL 0.39* 0.51* 0.52* 0.49* 0.52* 0.56*   < > 0.50* 0.48* 0.45* 0.42* 0.47*   BUN mg/dL 8 7* 8 8 9 11   < > 8 5* 8 10 10   BUN / CREAT RATIO  20.5  --   --   --   --   --   --  16.0 10.4 17.8 23.8 21.3   CALCIUM mg/dL 9.6 8.3* 8.1* 8.5 8.3* 8.7   < > 8.6 8.6 8.9 8.7 9.3   ALK PHOS U/L 772*  --   --   --   --   --   --  242* 203* 160* 121* 84   TOTAL PROTEIN g/dL 6.6  --   --   --   --   --   --  5.6* 5.7* 5.8* 5.8* 6.0   ALT (SGPT) U/L 104*  --   --   --   --   --   --  18 45* 22 24 21   AST (SGOT) U/L 100*  --   --   --   --   --   --  29 51* 29 31 28   BILIRUBIN mg/dL 0.4  --   --   --   --   --   --  1.5* 2.2* 2.1* 1.8* 1.4*   ALBUMIN g/dL 4.1  --   --   --   --   --   --  4.1 3.9 4.2 4.4 4.3   GLOBULIN gm/dL 2.5  --   --   --   --   --   --  1.5 1.8 1.6 1.4 1.7    < > = values in this interval not displayed.       Lab Results - Last 18 Months   Lab Units 10/06/23  1059 09/26/23  1414 08/19/23  0619 08/18/23  0406 08/17/23  0339 08/16/23  0451 08/11/23  0848 07/27/23  1550 06/22/22  1108 05/12/22  1029 05/05/22  1024 04/07/22  0820   URIC ACID mg/dL  --  5.0  --   --   --   --   --  9.5*  --  8.0*  --   --    LDH U/L 252* 255* 1,064* 583* 624* 715*   < > 628*   < > 285*   < > 242*   REFERENCE LAB REPORT   --   --   --   --   --   --   --   --   --   --   --  See attached report  See Attached Result  See Attached Report    < > = values in this interval not displayed.       Lab Results - Last 18 Months   Lab Units 10/06/23  1059 09/26/23  1414 07/27/23  1550 07/11/23  1021 06/15/23  0745 05/23/23  1036 04/19/23  1307 04/11/23  1348 01/10/23  1344   IRON mcg/dL 88  --   --  51 50 55  --  56 71   TIBC mcg/dL 422  --   --  307 292* 298  --  298 358   IRON SATURATION (TSAT) % 21  --   --  17* 17* 18*  --  19* 20   FERRITIN ng/mL  860.40* 1,643* 479* 366.50* 379.20* 428.30*  --  259.50* 366.50*   TSH uIU/mL  --   --   --   --  1.560  --  1.330  --   --        Lucía K Mariella reports a pain score of 0.         ASSESSMENT:  1.  Splenic marginal zone lymphoma.  Splenomegaly 18 cm on 01/19/2022.  Negative PET on 01/25/2022.  No adenopathies on CT of the chest, abdomen and pelvis on 01/19/2022.  Treatment status: Postsplenectomy at [facility] on 8/14/2023.  Adjuvant rituximab weekly x4 doses pending.  2.  2% monoclonal B-cell population on previous bone marrow biopsy 10/31/2019, marginal zone lymphoma.  Repeat bone marrow biopsy 03/01/2022.  Less than 1% small monoclonal B-cell population with a nonspecific immune phenotype.  Normal cytogenetics.  Variably hypercellular marrow with maturing trilineage hematopoiesis, erythroid hyperplasia and mild interstitial nodule small lymphocytic infiltrate, 5 to 10% adequate iron storage.  No evidence of overt myelodysplasia or plasma cell neoplasm.  Negative JAK2 and BCR ABL.  Negative Bcl-2, BCL6, 7 q. and MYB 88.  Stable for observation.  3.  Anemia of chronic disease and component of iron deficiency.  Intolerant to oral iron, severe constipation and stomach upset.  4.  Weight loss secondary to early satiety and hypersplenism, history of.  5.  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  Not associated with increased risk to develop critical symptoms of hemochromatosis.  6. Pancytopenia, from hypersplenism, history of.        PLAN:  1.   Re:   Seen by ANAIS Hamm on 7/27/2023.  Plan for splenectomy at [facility].  Pathology report 8/14/2023.  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  NYD 88 negative.  PET scan from [facility] on 9/22/2023.  No evidence for FDG avid lymphoma.  Increased size of a moderate right pleural effusion when compared to 8/18/2023, with resolution of the left pleural effusion.    Note  from  on 9/26/2023.  Patient felt well and healed from surgery.  PET scan 9/22/2023 failed to show significant adenopathy.  WBC 8, ANC 2.96, hemoglobin 14.1, hematocrit 46, MCV 92 and platelet 194.  CMP remarkable for alkaline phosphatase 733, AST 76 and ALT 89.  Ferritin 1643.  .  Uric acid 5.  Discussed with patient that she is no evidence of disease but not cured of the marginal zone lymphoma because anticipate marrow will remain positive and margins of lymphoma can have late relapse.  We can observe until symptoms arise and then give zanabrutinib or we can give 4 doses of rituximab to deepen the response.  Patient wants to get rituximab at [place].  Follow-up in 3 months.  PET scan 1 month after fourth dose of rituximab.  2.   Re: Heme status. WBC 9.1, ANC 2.67, hemoglobin 13.9. hematocrit 45.6, and platelet 191. Ferritin 860.4 and saturation 21%.     3.   Re: CMP. .5. Glucose 314 and she will call PCP,  from 89,  from 76 and alkaline phosphatase 772 from 733, observe.   4.   Re: LDH at 252.  AFP 2.6 on 7/11/2023.   5.   Re: Recommendations from [place].  Rituximab weekly x4 doses.  Then PET scan 1 month after completion of rituximab.  Follow-up with Dr. Pena in 3 months.  6.    Re: potential adverse effects of Rituxan specially infusion reactions, anaphylaxis, flushing, cytopenias, renal toxicity, nausea or hypotension  7.  Schedule rituximab 375 mg/m2 weekly x4 doses.  8.  Premed:  Zofran 8 mg IV  Decadron 12 mg IV  Pepcid 20 mg IV  Benadryl 25 mg IV  8.  Weekly CBC with differential and CMP with rituximab.  9.  Order PET 1 month after completion of rituximab x4 doses.  10.  Continue care per primary care physician and other specialist.  11.  Plan of care discussed with patient and friend.  Understanding expressed.  Patient agreeable to proceed.  12.  Annual flu vaccine, pneumococcal vaccine every 5 years and meningococcal vaccine  every 2 to 3 years. Meningococcal vaccine 10/11/2023.   13.  Blood for hepatitis B surface antigen and hepatitis B core antibody today before Rituxan.  14.  Return to office in 4 weeks.  Going on cruise 11/18/2023 until 11/28/2023.   15.  Treatment education.           I have reviewed the assessment and plan and verified the accuracy of it. No changes to assessment and plan since the information was documented. Benigno Hastings MD 10/06/23           I spent 43 total minutes, face-to-face, caring for Lucía today. Greater than 50% of this time involved counseling and/or coordination of care as documented within this note.       (Rio Pena MD [place])  (Matthew Sandoval MD)  (Trang Frank MD)  ANAIS Montano

## 2023-10-06 ENCOUNTER — LAB (OUTPATIENT)
Dept: LAB | Facility: HOSPITAL | Age: 63
End: 2023-10-06
Payer: COMMERCIAL

## 2023-10-06 ENCOUNTER — OFFICE VISIT (OUTPATIENT)
Dept: ONCOLOGY | Facility: CLINIC | Age: 63
End: 2023-10-06
Payer: COMMERCIAL

## 2023-10-06 VITALS
OXYGEN SATURATION: 98 % | BODY MASS INDEX: 23.39 KG/M2 | HEIGHT: 63 IN | DIASTOLIC BLOOD PRESSURE: 56 MMHG | RESPIRATION RATE: 18 BRPM | HEART RATE: 74 BPM | SYSTOLIC BLOOD PRESSURE: 106 MMHG | WEIGHT: 132 LBS | TEMPERATURE: 98.1 F

## 2023-10-06 DIAGNOSIS — C83.07 MARGINAL ZONE LYMPHOMA OF SPLEEN: ICD-10-CM

## 2023-10-06 DIAGNOSIS — D50.9 IRON DEFICIENCY ANEMIA, UNSPECIFIED IRON DEFICIENCY ANEMIA TYPE: Primary | ICD-10-CM

## 2023-10-06 DIAGNOSIS — D50.8 IRON DEFICIENCY ANEMIA SECONDARY TO INADEQUATE DIETARY IRON INTAKE: ICD-10-CM

## 2023-10-06 DIAGNOSIS — C83.07 MARGINAL ZONE LYMPHOMA OF SPLEEN: Primary | ICD-10-CM

## 2023-10-06 LAB
ALBUMIN SERPL-MCNC: 4.1 G/DL (ref 3.5–5.2)
ALBUMIN/GLOB SERPL: 1.6 G/DL
ALP SERPL-CCNC: 772 U/L (ref 39–117)
ALT SERPL W P-5'-P-CCNC: 104 U/L (ref 1–33)
ANION GAP SERPL CALCULATED.3IONS-SCNC: 9 MMOL/L (ref 5–15)
ANISOCYTOSIS BLD QL: ABNORMAL
AST SERPL-CCNC: 100 U/L (ref 1–32)
BILIRUB SERPL-MCNC: 0.4 MG/DL (ref 0–1.2)
BUN SERPL-MCNC: 8 MG/DL (ref 8–23)
BUN/CREAT SERPL: 20.5 (ref 7–25)
CALCIUM SPEC-SCNC: 9.6 MG/DL (ref 8.6–10.5)
CHLORIDE SERPL-SCNC: 101 MMOL/L (ref 98–107)
CO2 SERPL-SCNC: 27 MMOL/L (ref 22–29)
CREAT SERPL-MCNC: 0.39 MG/DL (ref 0.57–1)
DEPRECATED RDW RBC AUTO: 56.3 FL (ref 37–54)
EGFRCR SERPLBLD CKD-EPI 2021: 113.5 ML/MIN/1.73
EOSINOPHIL # BLD MANUAL: 0.38 10*3/MM3 (ref 0–0.4)
EOSINOPHIL NFR BLD MANUAL: 4.2 % (ref 0.3–6.2)
ERYTHROCYTE [DISTWIDTH] IN BLOOD BY AUTOMATED COUNT: 16.3 % (ref 12.3–15.4)
FERRITIN SERPL-MCNC: 860.4 NG/ML (ref 13–150)
GIANT PLATELETS: ABNORMAL
GLOBULIN UR ELPH-MCNC: 2.5 GM/DL
GLUCOSE SERPL-MCNC: 314 MG/DL (ref 65–99)
HBV CORE IGM SERPL QL IA: NORMAL
HBV SURFACE AG SERPL QL IA: NORMAL
HCT VFR BLD AUTO: 45.6 % (ref 34–46.6)
HGB BLD-MCNC: 13.9 G/DL (ref 12–15.9)
HOLD SPECIMEN: NORMAL
IRON 24H UR-MRATE: 88 MCG/DL (ref 37–145)
IRON SATN MFR SERPL: 21 % (ref 20–50)
LDH SERPL-CCNC: 252 U/L (ref 135–214)
LYMPHOCYTES # BLD MANUAL: 4.66 10*3/MM3 (ref 0.7–3.1)
LYMPHOCYTES NFR BLD MANUAL: 15.6 % (ref 5–12)
MCH RBC QN AUTO: 28 PG (ref 26.6–33)
MCHC RBC AUTO-ENTMCNC: 30.5 G/DL (ref 31.5–35.7)
MCV RBC AUTO: 91.9 FL (ref 79–97)
MONOCYTES # BLD: 1.43 10*3/MM3 (ref 0.1–0.9)
NEUTROPHILS # BLD AUTO: 2.67 10*3/MM3 (ref 1.7–7)
NEUTROPHILS NFR BLD MANUAL: 28.1 % (ref 42.7–76)
NEUTS BAND NFR BLD MANUAL: 1 % (ref 0–5)
PLATELET # BLD AUTO: 191 10*3/MM3 (ref 140–450)
PMV BLD AUTO: 12.3 FL (ref 6–12)
POIKILOCYTOSIS BLD QL SMEAR: ABNORMAL
POTASSIUM SERPL-SCNC: 4.5 MMOL/L (ref 3.5–5.2)
PROT SERPL-MCNC: 6.6 G/DL (ref 6–8.5)
RBC # BLD AUTO: 4.96 10*6/MM3 (ref 3.77–5.28)
SMUDGE CELLS BLD QL SMEAR: ABNORMAL
SODIUM SERPL-SCNC: 137 MMOL/L (ref 136–145)
TIBC SERPL-MCNC: 422 MCG/DL (ref 298–536)
TRANSFERRIN SERPL-MCNC: 283 MG/DL (ref 200–360)
VARIANT LYMPHS NFR BLD MANUAL: 20.8 % (ref 0–5)
VARIANT LYMPHS NFR BLD MANUAL: 30.2 % (ref 19.6–45.3)
WBC NRBC COR # BLD: 9.14 10*3/MM3 (ref 3.4–10.8)

## 2023-10-06 PROCEDURE — 83615 LACTATE (LD) (LDH) ENZYME: CPT

## 2023-10-06 PROCEDURE — 85025 COMPLETE CBC W/AUTO DIFF WBC: CPT

## 2023-10-06 PROCEDURE — 80053 COMPREHEN METABOLIC PANEL: CPT

## 2023-10-06 PROCEDURE — 87340 HEPATITIS B SURFACE AG IA: CPT

## 2023-10-06 PROCEDURE — 84466 ASSAY OF TRANSFERRIN: CPT

## 2023-10-06 PROCEDURE — 83540 ASSAY OF IRON: CPT

## 2023-10-06 PROCEDURE — 86705 HEP B CORE ANTIBODY IGM: CPT

## 2023-10-06 PROCEDURE — 36415 COLL VENOUS BLD VENIPUNCTURE: CPT

## 2023-10-06 PROCEDURE — 85007 BL SMEAR W/DIFF WBC COUNT: CPT

## 2023-10-06 PROCEDURE — 82728 ASSAY OF FERRITIN: CPT

## 2023-10-06 RX ORDER — PANTOPRAZOLE SODIUM 40 MG/1
TABLET, DELAYED RELEASE ORAL
COMMUNITY
Start: 2023-08-24

## 2023-10-06 RX ORDER — GABAPENTIN 300 MG/1
CAPSULE ORAL
COMMUNITY
Start: 2023-08-23

## 2023-10-06 RX ORDER — OMEPRAZOLE 20 MG/1
CAPSULE, DELAYED RELEASE ORAL
COMMUNITY
Start: 2023-09-16

## 2023-10-06 RX ORDER — APIXABAN 5 MG/1
TABLET, FILM COATED ORAL 2 TIMES DAILY
COMMUNITY
Start: 2023-08-23

## 2023-10-10 ENCOUNTER — CLINICAL SUPPORT (OUTPATIENT)
Dept: ONCOLOGY | Facility: CLINIC | Age: 63
End: 2023-10-10
Payer: COMMERCIAL

## 2023-10-10 DIAGNOSIS — C83.07 MARGINAL ZONE LYMPHOMA OF SPLEEN: Primary | ICD-10-CM

## 2023-10-10 DIAGNOSIS — D61.818 PANCYTOPENIA, ACQUIRED: ICD-10-CM

## 2023-10-10 RX ORDER — PROCHLORPERAZINE MALEATE 10 MG
10 TABLET ORAL EVERY 6 HOURS PRN
Qty: 60 TABLET | Refills: 1 | Status: SHIPPED | OUTPATIENT
Start: 2023-10-10

## 2023-10-10 RX ORDER — ONDANSETRON HYDROCHLORIDE 8 MG/1
8 TABLET, FILM COATED ORAL EVERY 8 HOURS PRN
Qty: 30 TABLET | Refills: 1 | Status: SHIPPED | OUTPATIENT
Start: 2023-10-10

## 2023-10-10 NOTE — PROGRESS NOTES
Subjective     PATIENT NAME:  Lucía Wolff  YOB: 1960  PATIENTS AGE:  63 y.o.  PATIENTS SEX:  female  DATE OF SERVICE:  10/10/2023  PROVIDER:  [facility]      ____________________PATIENT EDUCATION____________________    PATIENT EDUCATION:  Today I met with the patient Lucía Wolff to discuss the therapy regimen Rituxan recommended for treatment of her disease B-Cell Lymphoma.  The patient was given explanation of treatment premed side effects including office policy that prohibits patients to drive if sedating medications are administered, MD explanation given regarding benefits, side effects, toxicities and goals of treatment.  The patient received a Chemotherapy/Biotherapy Plan Summary including diagnosis and specific treatment plan.    SIDE EFFECTS:  Common side effects were discussed with the patient and/or significant other.  Discussion included hair loss/discoloration, anemia/fatigue, infection/chills/fever, appetite, bleeding risk/precautions, constipation, diarrhea, mouth sores, taste alteration, loss of appetite,nausea/vomiting, peripheral neuropathy, skin/nail changes, rash, muscle aches/weakness, photosensitivity, weight gain/loss, hearing loss, dizziness,sterility, high blood pressure, heart damage, liver damage, lung damage, kidney damage, DVT/PE risk, fluid retention, pleural/pericardial effusion, somnolence, electrolyte/LFT imbalance, vein exercises and/or the possible need for vascular access/port placement.  The patient was advice that although uncommon, leakage of an infused medication from the vein or venous access device (port) may lead to skin breakdown and/or other tissue damage.  The patient was advised that he/she may have pain, bleeding, and/or bruising from the insertion of a needle in their vein or venous access device (port).  The patient was further advised that, in spite of proper technique, infection with redness and irritation may rarely occur at the site  where the needle was inserted.  The patient was advised that if complications occur, additional medical treatment is available.    Discussion also included side effects specific to drugs in the treatment plan, specifically: Rituxan (skin peeling, joint pain, muscle aches, flu type symptoms, post nasal drip, n/v, neutropenia, anemia, weakness, fatigue, sob)    Protection during sexual relations.    A total of  60 minutes were spent with the patient, with 100% of time spent in education and counseling.

## 2023-10-11 RX ORDER — ACETAMINOPHEN 325 MG/1
650 TABLET ORAL ONCE
OUTPATIENT
Start: 2023-11-03

## 2023-10-11 RX ORDER — ACETAMINOPHEN 325 MG/1
650 TABLET ORAL ONCE
OUTPATIENT
Start: 2023-11-10

## 2023-10-11 RX ORDER — SODIUM CHLORIDE 9 MG/ML
250 INJECTION, SOLUTION INTRAVENOUS ONCE
OUTPATIENT
Start: 2023-10-27

## 2023-10-11 RX ORDER — FAMOTIDINE 10 MG/ML
20 INJECTION, SOLUTION INTRAVENOUS ONCE
Status: CANCELLED | OUTPATIENT
Start: 2023-10-16

## 2023-10-11 RX ORDER — SODIUM CHLORIDE 9 MG/ML
250 INJECTION, SOLUTION INTRAVENOUS ONCE
OUTPATIENT
Start: 2023-11-03

## 2023-10-11 RX ORDER — MEPERIDINE HYDROCHLORIDE 25 MG/ML
25 INJECTION INTRAMUSCULAR; INTRAVENOUS; SUBCUTANEOUS
Status: CANCELLED | OUTPATIENT
Start: 2023-10-16

## 2023-10-11 RX ORDER — ONDANSETRON 2 MG/ML
8 INJECTION INTRAMUSCULAR; INTRAVENOUS ONCE
Status: CANCELLED
Start: 2023-11-03 | End: 2023-10-30

## 2023-10-11 RX ORDER — DIPHENHYDRAMINE HYDROCHLORIDE 50 MG/ML
50 INJECTION INTRAMUSCULAR; INTRAVENOUS AS NEEDED
OUTPATIENT
Start: 2023-11-03

## 2023-10-11 RX ORDER — DIPHENHYDRAMINE HYDROCHLORIDE 50 MG/ML
50 INJECTION INTRAMUSCULAR; INTRAVENOUS AS NEEDED
OUTPATIENT
Start: 2023-11-10

## 2023-10-11 RX ORDER — ONDANSETRON 2 MG/ML
8 INJECTION INTRAMUSCULAR; INTRAVENOUS ONCE
Status: CANCELLED
Start: 2023-11-10 | End: 2023-11-06

## 2023-10-11 RX ORDER — MEPERIDINE HYDROCHLORIDE 25 MG/ML
25 INJECTION INTRAMUSCULAR; INTRAVENOUS; SUBCUTANEOUS
OUTPATIENT
Start: 2023-11-10

## 2023-10-11 RX ORDER — SODIUM CHLORIDE 9 MG/ML
250 INJECTION, SOLUTION INTRAVENOUS ONCE
Status: CANCELLED | OUTPATIENT
Start: 2023-10-16

## 2023-10-11 RX ORDER — SODIUM CHLORIDE 9 MG/ML
250 INJECTION, SOLUTION INTRAVENOUS ONCE
OUTPATIENT
Start: 2023-11-10

## 2023-10-11 RX ORDER — ONDANSETRON 2 MG/ML
8 INJECTION INTRAMUSCULAR; INTRAVENOUS ONCE
Status: CANCELLED
Start: 2023-10-27 | End: 2023-10-23

## 2023-10-11 RX ORDER — FAMOTIDINE 10 MG/ML
20 INJECTION, SOLUTION INTRAVENOUS AS NEEDED
OUTPATIENT
Start: 2023-11-03

## 2023-10-11 RX ORDER — MEPERIDINE HYDROCHLORIDE 25 MG/ML
25 INJECTION INTRAMUSCULAR; INTRAVENOUS; SUBCUTANEOUS
OUTPATIENT
Start: 2023-10-27

## 2023-10-11 RX ORDER — MEPERIDINE HYDROCHLORIDE 25 MG/ML
25 INJECTION INTRAMUSCULAR; INTRAVENOUS; SUBCUTANEOUS
OUTPATIENT
Start: 2023-11-03

## 2023-10-11 RX ORDER — FAMOTIDINE 10 MG/ML
20 INJECTION, SOLUTION INTRAVENOUS AS NEEDED
OUTPATIENT
Start: 2023-10-27

## 2023-10-11 RX ORDER — ACETAMINOPHEN 325 MG/1
650 TABLET ORAL ONCE
Status: CANCELLED | OUTPATIENT
Start: 2023-10-16

## 2023-10-11 RX ORDER — ONDANSETRON 2 MG/ML
8 INJECTION INTRAMUSCULAR; INTRAVENOUS ONCE
Status: CANCELLED
Start: 2023-10-16 | End: 2023-10-16

## 2023-10-11 RX ORDER — FAMOTIDINE 10 MG/ML
20 INJECTION, SOLUTION INTRAVENOUS AS NEEDED
Status: CANCELLED | OUTPATIENT
Start: 2023-10-16

## 2023-10-11 RX ORDER — FAMOTIDINE 10 MG/ML
20 INJECTION, SOLUTION INTRAVENOUS AS NEEDED
OUTPATIENT
Start: 2023-11-10

## 2023-10-11 RX ORDER — DIPHENHYDRAMINE HYDROCHLORIDE 50 MG/ML
50 INJECTION INTRAMUSCULAR; INTRAVENOUS AS NEEDED
Status: CANCELLED | OUTPATIENT
Start: 2023-10-16

## 2023-10-11 RX ORDER — DIPHENHYDRAMINE HYDROCHLORIDE 50 MG/ML
50 INJECTION INTRAMUSCULAR; INTRAVENOUS AS NEEDED
OUTPATIENT
Start: 2023-10-27

## 2023-10-11 RX ORDER — ACETAMINOPHEN 325 MG/1
650 TABLET ORAL ONCE
OUTPATIENT
Start: 2023-10-27

## 2023-10-12 ENCOUNTER — TELEPHONE (OUTPATIENT)
Dept: RADIATION ONCOLOGY | Facility: HOSPITAL | Age: 63
End: 2023-10-12
Payer: COMMERCIAL

## 2023-10-12 NOTE — TELEPHONE ENCOUNTER
ARIELLE called Ms. Wolff to discuss her distress score from her education visit on 10-10-23 for marginal zone lymphoma of spleen. ARIELLE explained role and source of support. She is 63 years old and lives alone. Her support system includes her daughter and friend. She plans to have either her daughter or friend drive her to her first treatment. Currently, she does not have financial concerns. Ms. Wolff states she was feeling overwhelmed by the possible side effects of treatment. Emotional support and reassurance provided. She plans to go on a cruise around Nov 18th- 26th. She does not take any medication for anxiety/depression and does not see a counselor. She is independent at home and wants to continue to be. She does have melatonin to help her sleep at night. ARIELLE will follow up with Ms. Wolff once she starts treatment.

## 2023-10-16 ENCOUNTER — INFUSION (OUTPATIENT)
Dept: ONCOLOGY | Facility: HOSPITAL | Age: 63
End: 2023-10-16
Payer: COMMERCIAL

## 2023-10-16 ENCOUNTER — LAB (OUTPATIENT)
Dept: LAB | Facility: HOSPITAL | Age: 63
End: 2023-10-16
Payer: COMMERCIAL

## 2023-10-16 ENCOUNTER — DOCUMENTATION (OUTPATIENT)
Dept: RADIATION ONCOLOGY | Facility: HOSPITAL | Age: 63
End: 2023-10-16
Payer: COMMERCIAL

## 2023-10-16 ENCOUNTER — TELEPHONE (OUTPATIENT)
Dept: ONCOLOGY | Facility: CLINIC | Age: 63
End: 2023-10-16
Payer: COMMERCIAL

## 2023-10-16 VITALS
TEMPERATURE: 97.1 F | BODY MASS INDEX: 22.15 KG/M2 | RESPIRATION RATE: 16 BRPM | DIASTOLIC BLOOD PRESSURE: 55 MMHG | HEIGHT: 63 IN | OXYGEN SATURATION: 98 % | HEART RATE: 65 BPM | WEIGHT: 125 LBS | SYSTOLIC BLOOD PRESSURE: 111 MMHG

## 2023-10-16 DIAGNOSIS — C83.07 MARGINAL ZONE LYMPHOMA OF SPLEEN: ICD-10-CM

## 2023-10-16 DIAGNOSIS — C83.07 MARGINAL ZONE LYMPHOMA OF SPLEEN: Primary | ICD-10-CM

## 2023-10-16 LAB
ALBUMIN SERPL-MCNC: 4.5 G/DL (ref 3.5–5.2)
ALBUMIN/GLOB SERPL: 2.1 G/DL
ALP SERPL-CCNC: 566 U/L (ref 39–117)
ALT SERPL W P-5'-P-CCNC: 99 U/L (ref 1–33)
ANION GAP SERPL CALCULATED.3IONS-SCNC: 9 MMOL/L (ref 5–15)
ANISOCYTOSIS BLD QL: ABNORMAL
AST SERPL-CCNC: 85 U/L (ref 1–32)
BILIRUB SERPL-MCNC: 0.4 MG/DL (ref 0–1.2)
BUN SERPL-MCNC: 11 MG/DL (ref 8–23)
BUN/CREAT SERPL: 26.2 (ref 7–25)
BURR CELLS BLD QL SMEAR: ABNORMAL
CALCIUM SPEC-SCNC: 9.8 MG/DL (ref 8.6–10.5)
CHLORIDE SERPL-SCNC: 102 MMOL/L (ref 98–107)
CO2 SERPL-SCNC: 30 MMOL/L (ref 22–29)
CREAT SERPL-MCNC: 0.42 MG/DL (ref 0.57–1)
DACRYOCYTES BLD QL SMEAR: ABNORMAL
DEPRECATED RDW RBC AUTO: 52 FL (ref 37–54)
EGFRCR SERPLBLD CKD-EPI 2021: 111.4 ML/MIN/1.73
ELLIPTOCYTES BLD QL SMEAR: ABNORMAL
EOSINOPHIL # BLD MANUAL: 0.26 10*3/MM3 (ref 0–0.4)
EOSINOPHIL NFR BLD MANUAL: 3 % (ref 0.3–6.2)
ERYTHROCYTE [DISTWIDTH] IN BLOOD BY AUTOMATED COUNT: 15.8 % (ref 12.3–15.4)
GIANT PLATELETS: ABNORMAL
GLOBULIN UR ELPH-MCNC: 2.1 GM/DL
GLUCOSE SERPL-MCNC: 154 MG/DL (ref 65–99)
HCT VFR BLD AUTO: 47.2 % (ref 34–46.6)
HGB BLD-MCNC: 14.4 G/DL (ref 12–15.9)
LYMPHOCYTES # BLD MANUAL: 5.26 10*3/MM3 (ref 0.7–3.1)
LYMPHOCYTES NFR BLD MANUAL: 9.1 % (ref 5–12)
MCH RBC QN AUTO: 27.5 PG (ref 26.6–33)
MCHC RBC AUTO-ENTMCNC: 30.5 G/DL (ref 31.5–35.7)
MCV RBC AUTO: 90.1 FL (ref 79–97)
MONOCYTES # BLD: 0.8 10*3/MM3 (ref 0.1–0.9)
NEUTROPHILS # BLD AUTO: 2.5 10*3/MM3 (ref 1.7–7)
NEUTROPHILS NFR BLD MANUAL: 28.3 % (ref 42.7–76)
PLATELET # BLD AUTO: 192 10*3/MM3 (ref 140–450)
PMV BLD AUTO: 12.9 FL (ref 6–12)
POIKILOCYTOSIS BLD QL SMEAR: ABNORMAL
POTASSIUM SERPL-SCNC: 4.4 MMOL/L (ref 3.5–5.2)
PROT SERPL-MCNC: 6.6 G/DL (ref 6–8.5)
RBC # BLD AUTO: 5.24 10*6/MM3 (ref 3.77–5.28)
SODIUM SERPL-SCNC: 141 MMOL/L (ref 136–145)
VARIANT LYMPHS NFR BLD MANUAL: 59.6 % (ref 19.6–45.3)
WBC MORPH BLD: NORMAL
WBC NRBC COR # BLD: 8.82 10*3/MM3 (ref 3.4–10.8)

## 2023-10-16 PROCEDURE — 96375 TX/PRO/DX INJ NEW DRUG ADDON: CPT

## 2023-10-16 PROCEDURE — 96376 TX/PRO/DX INJ SAME DRUG ADON: CPT

## 2023-10-16 PROCEDURE — 25810000003 SODIUM CHLORIDE 0.9 % SOLUTION 250 ML FLEX CONT: Performed by: INTERNAL MEDICINE

## 2023-10-16 PROCEDURE — 25010000002 DIPHENHYDRAMINE PER 50 MG: Performed by: INTERNAL MEDICINE

## 2023-10-16 PROCEDURE — 25010000002 RITUXIMAB 10 MG/ML SOLUTION 50 ML VIAL: Performed by: INTERNAL MEDICINE

## 2023-10-16 PROCEDURE — 25010000002 RITUXIMAB 10 MG/ML SOLUTION 10 ML VIAL: Performed by: INTERNAL MEDICINE

## 2023-10-16 PROCEDURE — 96415 CHEMO IV INFUSION ADDL HR: CPT

## 2023-10-16 PROCEDURE — 25010000002 HYDROCORTISONE SOD SUC (PF) 100 MG RECONSTITUTED SOLUTION: Performed by: INTERNAL MEDICINE

## 2023-10-16 PROCEDURE — 36415 COLL VENOUS BLD VENIPUNCTURE: CPT

## 2023-10-16 PROCEDURE — 96367 TX/PROPH/DG ADDL SEQ IV INF: CPT

## 2023-10-16 PROCEDURE — 25810000003 SODIUM CHLORIDE 0.9 % SOLUTION: Performed by: INTERNAL MEDICINE

## 2023-10-16 PROCEDURE — 85025 COMPLETE CBC W/AUTO DIFF WBC: CPT

## 2023-10-16 PROCEDURE — 96413 CHEMO IV INFUSION 1 HR: CPT

## 2023-10-16 PROCEDURE — 85007 BL SMEAR W/DIFF WBC COUNT: CPT

## 2023-10-16 PROCEDURE — 80053 COMPREHEN METABOLIC PANEL: CPT

## 2023-10-16 PROCEDURE — 25010000002 DEXAMETHASONE SODIUM PHOSPHATE 100 MG/10ML SOLUTION: Performed by: INTERNAL MEDICINE

## 2023-10-16 PROCEDURE — 25010000002 ONDANSETRON PER 1 MG: Performed by: INTERNAL MEDICINE

## 2023-10-16 RX ORDER — SODIUM CHLORIDE 9 MG/ML
250 INJECTION, SOLUTION INTRAVENOUS ONCE
Status: COMPLETED | OUTPATIENT
Start: 2023-10-16 | End: 2023-10-16

## 2023-10-16 RX ORDER — SODIUM CHLORIDE 9 MG/ML
250 INJECTION, SOLUTION INTRAVENOUS ONCE
Status: CANCELLED | OUTPATIENT
Start: 2023-10-20

## 2023-10-16 RX ORDER — FAMOTIDINE 10 MG/ML
20 INJECTION, SOLUTION INTRAVENOUS AS NEEDED
Status: CANCELLED | OUTPATIENT
Start: 2023-10-20 | End: 2023-10-20

## 2023-10-16 RX ORDER — MEPERIDINE HYDROCHLORIDE 50 MG/ML
25 INJECTION INTRAMUSCULAR; INTRAVENOUS; SUBCUTANEOUS
Status: DISCONTINUED | OUTPATIENT
Start: 2023-10-16 | End: 2023-10-16 | Stop reason: HOSPADM

## 2023-10-16 RX ORDER — DIPHENHYDRAMINE HYDROCHLORIDE 50 MG/ML
50 INJECTION INTRAMUSCULAR; INTRAVENOUS AS NEEDED
Status: COMPLETED | OUTPATIENT
Start: 2023-10-16 | End: 2023-10-16

## 2023-10-16 RX ORDER — ACETAMINOPHEN 325 MG/1
650 TABLET ORAL ONCE
Status: CANCELLED | OUTPATIENT
Start: 2023-10-20

## 2023-10-16 RX ORDER — ACETAMINOPHEN 325 MG/1
650 TABLET ORAL ONCE
Status: COMPLETED | OUTPATIENT
Start: 2023-10-16 | End: 2023-10-16

## 2023-10-16 RX ORDER — FAMOTIDINE 10 MG/ML
20 INJECTION, SOLUTION INTRAVENOUS ONCE
Status: COMPLETED | OUTPATIENT
Start: 2023-10-16 | End: 2023-10-16

## 2023-10-16 RX ORDER — FAMOTIDINE 10 MG/ML
20 INJECTION, SOLUTION INTRAVENOUS ONCE
Status: CANCELLED | OUTPATIENT
Start: 2023-10-20

## 2023-10-16 RX ORDER — FAMOTIDINE 10 MG/ML
20 INJECTION, SOLUTION INTRAVENOUS AS NEEDED
Status: COMPLETED | OUTPATIENT
Start: 2023-10-16 | End: 2023-10-16

## 2023-10-16 RX ORDER — MEPERIDINE HYDROCHLORIDE 50 MG/ML
25 INJECTION INTRAMUSCULAR; INTRAVENOUS; SUBCUTANEOUS
Status: CANCELLED | OUTPATIENT
Start: 2023-10-20 | End: 2023-10-20

## 2023-10-16 RX ORDER — DIPHENHYDRAMINE HYDROCHLORIDE 50 MG/ML
50 INJECTION INTRAMUSCULAR; INTRAVENOUS AS NEEDED
Status: CANCELLED | OUTPATIENT
Start: 2023-10-20 | End: 2023-10-20

## 2023-10-16 RX ADMIN — DIPHENHYDRAMINE HYDROCHLORIDE 50 MG: 50 INJECTION, SOLUTION INTRAMUSCULAR; INTRAVENOUS at 13:00

## 2023-10-16 RX ADMIN — DEXAMETHASONE SODIUM PHOSPHATE 12 MG: 10 INJECTION, SOLUTION INTRAMUSCULAR; INTRAVENOUS at 10:27

## 2023-10-16 RX ADMIN — FAMOTIDINE 20 MG: 10 INJECTION INTRAVENOUS at 10:48

## 2023-10-16 RX ADMIN — FAMOTIDINE 20 MG: 10 INJECTION INTRAVENOUS at 12:58

## 2023-10-16 RX ADMIN — DIPHENHYDRAMINE HYDROCHLORIDE 25 MG: 50 INJECTION, SOLUTION INTRAMUSCULAR; INTRAVENOUS at 10:48

## 2023-10-16 RX ADMIN — SODIUM CHLORIDE 250 ML: 9 INJECTION, SOLUTION INTRAVENOUS at 10:27

## 2023-10-16 RX ADMIN — RITUXIMAB 600 MG: 10 INJECTION, SOLUTION INTRAVENOUS at 11:25

## 2023-10-16 RX ADMIN — HYDROCORTISONE SODIUM SUCCINATE 100 MG: 100 INJECTION, POWDER, FOR SOLUTION INTRAMUSCULAR; INTRAVENOUS at 13:00

## 2023-10-16 RX ADMIN — ACETAMINOPHEN 650 MG: 325 TABLET, FILM COATED ORAL at 10:26

## 2023-10-16 RX ADMIN — ONDANSETRON 8 MG: 2 INJECTION INTRAMUSCULAR; INTRAVENOUS at 11:07

## 2023-10-16 NOTE — PROGRESS NOTES
ARIELLE met with Ms. Wolff in person before she started her treatment. ARIELLE explained role and source of support. Ms. Wolff states she was feeling OK. Emotional support provided. ARIELLE also provided her with coping strategies to help reduce anxiety. SW will remain available.

## 2023-10-16 NOTE — TELEPHONE ENCOUNTER
Call from Elif RN from the infusion center to report that they needed to stop rituxan infusion about 5 minutes ago. Patient reported trouble swallowing. Rescue meds were given. Pt receive Pepcid, benadryl, solumedrol. She is feeling better now.     Discussed with Dr. Hastings. We will hold the rest of her tx today. Will retry later this week with changes to the premeds. Solumedrol 125, pepcid 20, benadryl 25, and tylenol 500. Patient is rescheduled to this Friday at 7:30 am.

## 2023-10-16 NOTE — PROGRESS NOTES
1300 called the office and spoke with kecia Cardoza let her know that pt has c/o of trouble swallowing, breathing ok. Vss. Rescue meds were initiated, rituxan was stopped at 1256.  1305 kecia cardoza called back and stated dr jay will not re challenge today. And the office will try and reschedule for late this week. And he will review the premeds. Pt v/u

## 2023-10-17 ENCOUNTER — TELEPHONE (OUTPATIENT)
Dept: ONCOLOGY | Facility: CLINIC | Age: 63
End: 2023-10-17
Payer: COMMERCIAL

## 2023-10-17 RX ORDER — METHYLPREDNISOLONE SODIUM SUCCINATE 125 MG/2ML
125 INJECTION, POWDER, LYOPHILIZED, FOR SOLUTION INTRAMUSCULAR; INTRAVENOUS ONCE
Start: 2023-11-03 | End: 2023-11-03

## 2023-10-17 RX ORDER — METHYLPREDNISOLONE SODIUM SUCCINATE 125 MG/2ML
125 INJECTION, POWDER, LYOPHILIZED, FOR SOLUTION INTRAMUSCULAR; INTRAVENOUS ONCE
Start: 2023-11-10 | End: 2023-11-10

## 2023-10-17 RX ORDER — METHYLPREDNISOLONE SODIUM SUCCINATE 125 MG/2ML
125 INJECTION, POWDER, LYOPHILIZED, FOR SOLUTION INTRAMUSCULAR; INTRAVENOUS ONCE
Start: 2023-10-27 | End: 2023-10-27

## 2023-10-17 RX ORDER — FAMOTIDINE 10 MG/ML
40 INJECTION, SOLUTION INTRAVENOUS ONCE
Status: CANCELLED | OUTPATIENT
Start: 2023-10-20

## 2023-10-17 RX ORDER — METHYLPREDNISOLONE SODIUM SUCCINATE 125 MG/2ML
125 INJECTION, POWDER, LYOPHILIZED, FOR SOLUTION INTRAMUSCULAR; INTRAVENOUS ONCE
Status: CANCELLED
Start: 2023-10-20 | End: 2023-10-20

## 2023-10-17 NOTE — TELEPHONE ENCOUNTER
Contacted patient Lucía Ceja, she was informed a prescription for her Magic Mouth Rinse will be sent to her Pharmacy   [facility].  Pt v/u      Spoke with Bucky Pharmacist @ [facility]  Verbal order for Magic Mouth Rinse  Mix equal parts  Nystatin  Mylanta  Lidocaine  5 cc Q 4-6 hours prn swish and spit # 360 ml, no refills    Patient notified and will  script

## 2023-10-17 NOTE — TELEPHONE ENCOUNTER
Received call from patient Lucía Wolff, she calls to question if there is something that she can use for the discomfort she feels in her throat which began yesterday after her treatment reaction she experienced with her chemo txt Rituxan.     Please advise

## 2023-10-18 NOTE — PROGRESS NOTES
[facility] HEMATOLOGY & ONCOLOGY  [address]  [facility] [place] [facility] [place] [address] [phone]    Patient Name: Lucía Wolff  Encounter Date: 11/03/2023  YOB: 1960  Patient Number: [id number]      REASON FOR FOLLOW-UP: Lucía Wolff is a pleasant 63 y.o. 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  Her bone marrow showed B-cell lymphoma. She is also seen for splenomegaly from B-cell lymphoma.  She is seen week 3 of rituximab. She is seen with her friend and nurse Jacquie at the chemo suite. History from the patient.  History is considered reliable.        Oncology/Hematology History Overview Note   DIAGNOSTIC ABNORMALITIES:  CBC 01/17/2022. Remarkable for platelet 97. Bilirubin 1.9.   CT neck 01/19/2022.  No cervical lymphadenopathy by size criteria.  Asymmetric appearance of the left submandibular gland. Correlation for sialoadenitis recommended. If indicated, dedicated ultrasound may be Performed.  CT chest 01/19/2022. No intrathoracic lymphadenopathy. No axillary lymphadenopathy  CT abdomen and pelvis 01/19/2022. Massive splenomegaly.  Splenic artery aneurysm, 1.5 cm.  No additional abdominal or pelvic lymphadenopathy.  PET 01/25/2022. No hypermetabolic lymphadenopathy or tumor. No scintigraphic evidence of malignancy. . Persistent prominent splenomegaly.  Recommendations from [place] on 05/12/2022.  The palpable splenomegaly in the absence of additional findings to suspect avid adenopathy and liver disease suggested the patient has hypersplenism.  Other differential include nutritional deficiencies or cyclical neutropenia.  The enlarging spleen is not significantly PET avid therefore splenectomy would not be recommended at this time. Other reason is infection with postsplenectomy state. Would recommend splenectomy if the patient continues to develop new symptoms for example losing another  10 pounds in 6 months, recurrent major infections, transfusion dependent cytopenias or splenic infarcts.   JAK2 was negative, normal copper at 123.2, B12 at 748, reticulocyte 4%, negative hepatitis C antibody, uric acid 8, WBC 3.8, hemoglobin 12.4, MCV 89, platelet 116, bilirubin 2.5, , and ferritin 262 at [place].   Seen by ANAIS Hamm on 7/27/2023.  Plan for splenectomy at [facility].  Pathology report 8/14/2023.  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  NYD 88 negative.  PET scan from [facility] on 9/22/2023.  No evidence for FDG avid lymphoma.  Increased size of a moderate right pleural effusion when compared to 8/18/2023, with resolution of the left pleural effusion.    Note from  on 9/26/2023.  Patient felt well and healed from surgery.  PET scan 9/22/2023 failed to show significant adenopathy.  WBC 8, ANC 2.96, hemoglobin 14.1, hematocrit 46, MCV 92 and platelet 194.  CMP remarkable for alkaline phosphatase 733, AST 76 and ALT 89.  Ferritin 1643.  .  Uric acid 5.  Discussed with patient that she is no evidence of disease but not cured of the marginal zone lymphoma because anticipate marrow will remain positive and margins of lymphoma can have late relapse.  We can observe until symptoms arise and then give zanabrutinib or we can give 4 doses of rituximab to deepen the response.  Patient wants to get rituximab locally.  Follow-up in 3 months.  PET scan 1 month after fourth dose of rituximab         PREVIOUS INTERVENTIONS:  Ferrous sulfate 4/11/2023 to 4/24/2023, intolerant, severe constipation and GI upset.  Venofer 200 mg 5/8/2023 and 5/15/2023.  Rituximab 10/16/2023 with hypersensitivity reaction, 5 minutes into infusion.     Marginal zone lymphoma of spleen   3/28/2022 Initial Diagnosis    Marginal zone lymphoma of spleen (HCC)     10/20/2023 -  Chemotherapy    OP LYMPHOMA (CLL) RiTUXimab (Weekly X 4)         PAST MEDICAL  "HISTORY:  ALLERGIES:  No Known Allergies  CURRENT MEDICATIONS:  Outpatient Encounter Medications as of 11/3/2023   Medication Sig Dispense Refill    acetaminophen (TYLENOL) 325 MG tablet Take 2 tablets by mouth Every 6 (Six) Hours As Needed for Mild Pain.      calcium carbonate (OS-MITRA) 600 MG tablet Take 1 tablet by mouth 2 (Two) Times a Day With Meals.      Cholecalciferol (vitamin D3) 125 MCG (5000 UT) capsule capsule Take 1 capsule by mouth Daily.      cyanocobalamin 1000 MCG/ML injection INJECT 1 ML EVERY 28 DAYS. 12 mL 0    Dextromethorphan-guaiFENesin (MUCINEX DM PO) Take 1 tablet by mouth 2 (Two) Times a Day As Needed.      Eliquis 5 MG tablet tablet Take  by mouth 2 (Two) Times a Day.      folic acid (FOLVITE) 1 MG tablet Take 1 tablet by mouth Daily.      gabapentin (NEURONTIN) 300 MG capsule Take 1 capsule 3 times a day by oral route for 8 days.      metFORMIN (GLUCOPHAGE) 500 MG tablet Take 1 tablet by mouth 2 (Two) Times a Day With Meals.      Multiple Vitamins-Minerals (MULTIVITAMIN WITH MINERALS) tablet tablet Take 1 tablet by mouth Daily.      Omega-3 300 MG capsule Take 4 capsules by mouth Daily.      omeprazole (priLOSEC) 20 MG capsule       ondansetron (Zofran) 8 MG tablet Take 1 tablet by mouth Every 8 (Eight) Hours As Needed for Nausea or Vomiting. 30 tablet 1    pantoprazole (PROTONIX) 40 MG EC tablet Take 1 tablet every day by oral route.      prochlorperazine (COMPAZINE) 10 MG tablet Take 1 tablet by mouth Every 6 (Six) Hours As Needed for Nausea or Vomiting. 60 tablet 1    Syringe 25G X 1\" 3 ML misc USE 1 SYRINGE PER B12 INJECTION 12 each 0    Thyroid 65 MG PO tablet Take 1 tablet by mouth Daily.      VIT B12-METHIONINE-INOS-CHOL IM Inject 1 mL into the appropriate muscle as directed by prescriber Every 30 (Thirty) Days. Has not taken since 2020 at least        Facility-Administered Encounter Medications as of 11/3/2023   Medication Dose Route Frequency Provider Last Rate Last Admin    " [COMPLETED] acetaminophen (TYLENOL) tablet 650 mg  650 mg Oral Once Benigno Hastings MD   650 mg at 11/03/23 0901    diphenhydrAMINE (BENADRYL) injection 50 mg  50 mg Intravenous PRN Benigno Hastings MD        [COMPLETED] diphenhydrAMINE (BENADRYL) IVPB 25 mg  25 mg Intravenous Once Benigno Hastings MD   Stopped at 11/03/23 0923    famotidine (PEPCID) injection 20 mg  20 mg Intravenous PRN Benigno Hastings MD        Hydrocortisone Sod Suc (PF) (Solu-CORTEF) injection 100 mg  100 mg Intravenous PRN Benigno Hastings MD        meperidine (DEMEROL) injection 25 mg  25 mg Intravenous Q20 Min PRN Benigno Hastings MD        [COMPLETED] methylPREDNISolone sodium succinate (SOLU-Medrol) injection 125 mg  125 mg Intravenous Once Benigno Hastings MD   125 mg at 11/03/23 0901    [COMPLETED] riTUXimab (RITUXAN) 600 mg in sodium chloride 0.9 % 310 mL IVPB  600 mg Intravenous Once Benigno Hastings  mL/hr at 11/03/23 1100 Rate Change (DUAL SIGN) at 11/03/23 1100    [COMPLETED] sodium chloride 0.9 % infusion 250 mL  250 mL Intravenous Once Benigno Hastings MD 20 mL/hr at 11/03/23 0853 250 mL at 11/03/23 0853     ADULT ILLNESSES:  Patient Active Problem List   Diagnosis Code    Heart murmur R01.1    Thrombocytopenia D69.6    Marginal zone lymphoma of spleen C83.07    Splenomegaly R16.1    Pancytopenia, acquired D61.818    Iron deficiency anemia D50.9    Intestinal malabsorption, unspecified K90.9    Transfusion reaction T80.92XA     SURGERIES:  Past Surgical History:   Procedure Laterality Date    CHOLECYSTECTOMY      HYSTERECTOMY  2003    PARTIAL    LIVER BIOPSY  08/12/2019     HEALTH MAINTENANCE ITEMS:  Health Maintenance Due   Topic Date Due    URINE MICROALBUMIN  Never done    COLORECTAL CANCER SCREENING  Never done    Pneumococcal Vaccine 0-64 (1 - PCV) Never done    TDAP/TD VACCINES (1 - Tdap) Never done    ZOSTER VACCINE (1 of 2) Never done    ANNUAL PHYSICAL  Never done    DIABETIC FOOT EXAM  Never done    PAP SMEAR  Never done     "DIABETIC EYE EXAM  Never done    MAMMOGRAM  11/01/2020    Hepatitis B (1 of 3 - Risk 3-dose series) Never done    INFLUENZA VACCINE  08/01/2023    COVID-19 Vaccine (4 - 2023-24 season) 09/01/2023       <no information>  Last Completed Colonoscopy       This patient has no relevant Health Maintenance data.          There is no immunization history for the selected administration types on file for this patient.  Last Completed Mammogram       This patient has no relevant Health Maintenance data.              FAMILY HISTORY:  Family History   Problem Relation Age of Onset    Alzheimer's disease Mother     Diabetes Father     Stroke Father     Cardiomyopathy Father     Heart attack Father     Heart disease Father     Hypertension Father     Cancer Brother         She is unclear of the kind of cancer    Diabetes Brother     Diabetes Brother     Hypertension Brother     No Known Problems Brother     No Known Problems Son     No Known Problems Son     No Known Problems Daughter      SOCIAL HISTORY:  Social History     Socioeconomic History    Marital status:    Tobacco Use    Smoking status: Never    Smokeless tobacco: Never    Tobacco comments:     has tried it but did not keep smoking   Vaping Use    Vaping Use: Never used   Substance and Sexual Activity    Alcohol use: Never    Drug use: No    Sexual activity: Defer       REVIEW OF SYSTEMS:    Review of Systems   Constitutional:  Negative for fatigue and fever.        \"I feel fine.\"   HENT:  Negative for congestion and mouth sores.    Eyes:  Negative for discharge and redness.   Respiratory:  Negative for shortness of breath and wheezing.    Cardiovascular:  Negative for chest pain and palpitations.   Gastrointestinal:  Negative for abdominal pain, nausea and vomiting.   Endocrine: Negative for polydipsia and polyphagia.   Genitourinary:  Negative for dysuria and flank pain.   Musculoskeletal:  Negative for gait problem and myalgias.   Skin:  Negative for " "pallor.   Allergic/Immunologic: Negative for food allergies.   Neurological:  Negative for dizziness, speech difficulty and weakness.   Hematological:  Negative for adenopathy. Does not bruise/bleed easily.   Psychiatric/Behavioral:  Negative for agitation, confusion and hallucinations.        VITAL SIGNS: /48   Pulse 70   Temp 98.1 °F (36.7 °C)   Resp 18   Ht 160 cm (63\")   Wt 61.7 kg (136 lb)   SpO2 99%   Breastfeeding No   BMI 24.09 kg/m²   Pain Score    11/03/23 0903   PainSc: 0-No pain       PHYSICAL EXAMINATION:     Physical Exam  Vitals reviewed.   Constitutional:       General: She is not in acute distress.  HENT:      Head: Normocephalic and atraumatic.   Eyes:      General: No scleral icterus.  Cardiovascular:      Rate and Rhythm: Normal rate.   Pulmonary:      Effort: No respiratory distress.      Breath sounds: No wheezing or rales.   Abdominal:      General: Bowel sounds are normal.      Palpations: Abdomen is soft.      Tenderness: There is no abdominal tenderness.   Musculoskeletal:         General: No swelling.      Cervical back: Neck supple.   Skin:     General: Skin is warm.      Coloration: Skin is not pale.   Neurological:      Mental Status: She is alert and oriented to person, place, and time.   Psychiatric:         Mood and Affect: Mood normal.         Behavior: Behavior normal.         Thought Content: Thought content normal.         Judgment: Judgment normal.         LABS    Lab Results - Last 18 Months   Lab Units 11/03/23  0742 10/27/23  0814 10/16/23  0812 10/06/23  1059 08/10/23  0953 08/03/23  1020 07/13/23  1309 07/11/23  1021 06/15/23  0745 04/11/23  1348 02/28/23  0852 01/10/23  1344 11/22/22  0937 10/11/22  1258 08/03/22  1036 06/22/22  1108 05/05/22  1024   HEMOGLOBIN g/dL 13.7 13.8 14.4 13.9 6.1* 5.5* 8.3* 6.6* 7.1*   < > 9.2* 12.7 10.2* 10.1* 8.6*   < > 10.7*   HEMATOCRIT % 44.3 45.0 47.2* 45.6 20.4* 18.2* 27.5* 22.8* 23.9*   < > 29.2* 38.0 31.4* 31.7* 27.4*   < > " 33.6*   MCV fL 89.5 90.0 90.1 91.9 95.3 94.3 92.9 96.6 94.1   < > 91.3 86.6 89.2 90.1 90.4   < > 89.6   WBC 10*3/mm3 9.97 10.35 8.82 9.14 1.62* 1.29* 1.53* 1.11* 0.89*   < > 1.73* 4.07 2.58* 2.29* 1.88*   < > 2.64*   RDW % 15.8* 15.7* 15.8* 16.3* 21.6* 19.5* 18.8* 19.8* 19.1*   < > 18.6* 16.2* 16.1* 17.6* 17.3*   < > 17.1*   MPV fL 11.3 12.0 12.9* 12.3* 10.3 10.4 9.7 10.9 9.7   < > 10.3 10.3 9.7 9.6 9.9   < > 10.0   PLATELETS 10*3/mm3 250 241 192 191 77* 63* 69* 67* 68*   < > 76* 112* 95* 90* 73*   < > 85*   IMM GRAN % %  --   --   --   --   --   --   --   --   --   --  1.2*  --  0.4 0.4 0.5  --   --    NEUTROS ABS 10*3/mm3 2.69 3.73 2.50 2.67 1.02* 0.90* 1.07* 0.77* 0.63*   < > 1.20* 2.77 1.78 1.54* 1.16*   < > 1.93   LYMPHS ABS 10*3/mm3  --   --   --   --   --   --   --   --   --   --  0.36* 1.02 0.53* 0.59* 0.51*  --  0.50*   MONOS ABS 10*3/mm3  --   --   --   --   --   --   --   --   --   --  0.12 0.22 0.22 0.11 0.16  --  0.16   EOS ABS 10*3/mm3 0.30 0.10 0.26 0.38 0.02 0.03  --  0.01 0.01   < > 0.02 0.03 0.03 0.03 0.03   < > 0.03   BASOS ABS 10*3/mm3  --   --   --   --   --  0.01  --  0.03 0.01  --  0.01 0.01 0.01 0.01 0.01  --  0.01   IMMATURE GRANS (ABS) 10*3/mm3  --   --   --   --   --   --   --   --   --   --  0.02  --  0.01 0.01 0.01  --   --    NRBC /100 WBC  --   --   --   --   --  1.0*  --  1.0*  --   --  0.0  --  0.0 0.0 0.0  --   --    NEUTROPHIL % % 27.0* 35.1* 28.3* 28.1* 63.0 64.3 69.0 67.0 69.4   < >  --   --   --   --   --    < >  --    MONOCYTES % % 10.0 9.3 9.1 15.6* 1.0* 3.1* 5.0 2.0* 4.1*   < >  --   --   --   --   --    < >  --    BASOPHIL % %  --   --   --   --   --  1.0  --  3.0* 1.0  --   --   --   --   --   --   --   --    ATYP LYMPH % % 30.0* 9.3*  --  20.8* 2.0 1.0 2.0  --   --    < >  --   --   --   --   --    < >  --    ANISOCYTOSIS  Slight/1+ Slight/1+ Slight/1+ Slight/1+ Mod/2+ Mod/2+ Slight/1+ Mod/2+ Slight/1+   < >  --   --   --   --   --    < >  --    GIANT PLT  Slight/1+  Large/3+ Large/3+ Large/3+ Slight/1+  --   --  Mod/2+ Slight/1+   < >  --   --   --   --   --    < >  --     < > = values in this interval not displayed.       Lab Results - Last 18 Months   Lab Units 11/03/23  0742 10/27/23  0814 10/16/23  0812 10/06/23  1059 08/23/23  0455 08/22/23  0458 08/11/23  0848 07/11/23  1021 06/15/23  0745   GLUCOSE mg/dL 158* 162* 154* 314*  --   --   --  171* 121*   SODIUM mmol/L 140 139 141 137 138 132*   < > 141 143   POTASSIUM mmol/L 4.5 4.1 4.4 4.5 3.4 3.7   < > 4.4 3.6   TOTAL CO2 mmol/L  --   --   --   --  23 19*   < >  --   --    CO2 mmol/L 30.0* 27.0 30.0* 27.0  --   --   --  26.0 26.0   CHLORIDE mmol/L 100 101 102 101 104 107   < > 104 106   ANION GAP mmol/L 10.0 11.0 9.0 9.0 11 6   < > 11.0 11.0   CREATININE mg/dL 0.44* 0.47* 0.42* 0.39* 0.51* 0.52*   < > 0.50* 0.48*   BUN mg/dL 12 14 11 8 7* 8   < > 8 5*   BUN / CREAT RATIO  27.3* 29.8* 26.2* 20.5  --   --   --  16.0 10.4   CALCIUM mg/dL 9.9 9.4 9.8 9.6 8.3* 8.1*   < > 8.6 8.6   ALK PHOS U/L 365* 359* 566* 772*  --   --   --  242* 203*   TOTAL PROTEIN g/dL 6.8 6.6 6.6 6.6  --   --   --  5.6* 5.7*   ALT (SGPT) U/L 97* 70* 99* 104*  --   --   --  18 45*   AST (SGOT) U/L 56* 53* 85* 100*  --   --   --  29 51*   BILIRUBIN mg/dL 0.4 0.4 0.4 0.4  --   --   --  1.5* 2.2*   ALBUMIN g/dL 4.5 4.4 4.5 4.1  --   --   --  4.1 3.9   GLOBULIN gm/dL 2.3 2.2 2.1 2.5  --   --   --  1.5 1.8    < > = values in this interval not displayed.       Lab Results - Last 18 Months   Lab Units 10/06/23  1059 09/26/23  1414 08/19/23  0619 08/18/23  0406 08/17/23  0339 08/16/23  0451 08/11/23  0848 07/27/23  1550 06/22/22  1108 05/12/22  1029   URIC ACID mg/dL  --  5.0  --   --   --   --   --  9.5*  --  8.0*   LDH U/L 252* 255* 1,064* 583* 624* 715*   < > 628*   < > 285*    < > = values in this interval not displayed.       Lab Results - Last 18 Months   Lab Units 10/27/23  0814 10/06/23  1059 09/26/23  1414 07/27/23  1550 07/11/23  1021 06/15/23  0745  05/23/23  1036 04/19/23  1307 04/11/23  1348 01/10/23  1344   IRON mcg/dL  --  88  --   --  51 50 55  --  56 71   TIBC mcg/dL  --  422  --   --  307 292* 298  --  298 358   IRON SATURATION (TSAT) %  --  21  --   --  17* 17* 18*  --  19* 20   FERRITIN ng/mL  --  860.40* 1,643* 479* 366.50* 379.20* 428.30*  --  259.50* 366.50*   TSH uIU/mL 1.380  --   --   --   --  1.560  --  1.330  --   --    FOLATE ng/mL >20.00  --   --   --   --   --   --   --   --   --        Lucía Wolff reports a pain score of 0.            ASSESSMENT:  1.  Splenic marginal zone lymphoma.  Splenomegaly 18 cm on 01/19/2022.  Negative PET on 01/25/2022.  No adenopathies on CT of the chest, abdomen and pelvis on 01/19/2022.  Treatment status: Postsplenectomy at [place] on 8/14/2023.  Adjuvant rituximab weekly x4 doses to complete 11/10/2023.  2.  2% monoclonal B-cell population on previous bone marrow biopsy 10/31/2019, marginal zone lymphoma.  Repeat bone marrow biopsy 03/01/2022.  Less than 1% small monoclonal B-cell population with a nonspecific immune phenotype.  Normal cytogenetics.  Variably hypercellular marrow with maturing trilineage hematopoiesis, erythroid hyperplasia and mild interstitial nodule small lymphocytic infiltrate, 5 to 10% adequate iron storage.  No evidence of overt myelodysplasia or plasma cell neoplasm.  Negative JAK2 and BCR ABL.  Negative Bcl-2, BCL6, 7 q. and MYB 88.    3.  Anemia of chronic disease and component of iron deficiency.  Intolerant to oral iron, severe constipation and stomach upset.  Intravenous iron as needed.  4.  Grade 1 mucositis on 10/17/2023.  On Magic mouthwash as needed.  5.  Hypersensitivity reaction to rituximab on 10/16/2023.  6.  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  Not associated with increased risk to develop critical symptoms of hemochromatosis.  7. Pancytopenia, from hypersplenism, history of.  8.  Weight loss secondary to early satiety  "and hypersplenism, history of.           PLAN:  1.   Re: Tolerance to rituximab. \"Doing fine.\"  Hypersensitivity reaction/bibasilar infusion on 10/16/2023.  She had sore mouth on 10/17/2023.  2.   Re: Heme status. WBC 9.9, ANC 2.69, hemoglobin 13.7. hematocrit 44.3, and platelet 250. Ferritin   3.   Re: CMP. .2 ml/min, ALT 97, AST 56 and alkaline phosphatase 365.   4.   Re: Proceed with Rituxan.  5.   Re: Plan for PET scan 1 month after completion of rituximab.  Follow-up with Dr. Pena in 2 months.  6.    Re: Monitor for adverse effects of rituximab like anaphylaxis, recurrent infusion reactions, myelosuppression, flushing, renal toxicity, nausea or hypotension  7.  Schedule rituximab 375 mg/m2 weekly 11/3/2023 and 11/10/2023 for total of 4 doses.  8.  Premed:  Zofran 8 mg IV  Solu-Medrol 125 mg IV  Pepcid 20 mg IV  Benadryl 50 mg IV  8.  Weekly CBC with differential and CMP with rituximab.  9.  Schedule PET 1 month after completion of rituximab x4 doses.  10.  Continue care per primary care physician and other specialist.  11.  Plan of care discussed with patient, nurse Jacquie and her friend.  Understanding expressed.  Patient agreeable to proceed.  12.  Annual flu vaccine, pneumococcal vaccine every 5 years and meningococcal vaccine every 2 to 3 years. Meningococcal vaccine 10/11/2023.   13.  eRx ondansetron 8 mg p.o. every 8 hours as needed for nausea and vomiting #60, 2 refills.  14.  eRx Magic mouthwash 15 every 4 hours as needed for stomatitis, 418, 1 refill.  15.  Return to office in 4 weeks with CBC with differential, CMP and LDH.            I have reviewed the assessment and plan and verified the accuracy of it. No changes to assessment and plan since the information was documented. Benigno Hastings MD 11/03/23            I spent 41 total minutes, face-to-face, caring for Lucía today. Greater than 50% of this time involved counseling and/or coordination of " care as documented within this note.      (Rio Pena MD [place])  (Matthew Sandoval MD)  (Trang Frank MD)  ANAIS Montano

## 2023-10-20 ENCOUNTER — APPOINTMENT (OUTPATIENT)
Dept: LAB | Facility: HOSPITAL | Age: 63
End: 2023-10-20
Payer: COMMERCIAL

## 2023-10-20 ENCOUNTER — INFUSION (OUTPATIENT)
Dept: ONCOLOGY | Facility: HOSPITAL | Age: 63
End: 2023-10-20
Payer: COMMERCIAL

## 2023-10-20 ENCOUNTER — LAB (OUTPATIENT)
Dept: LAB | Facility: HOSPITAL | Age: 63
End: 2023-10-20
Payer: COMMERCIAL

## 2023-10-20 VITALS
DIASTOLIC BLOOD PRESSURE: 65 MMHG | WEIGHT: 129.8 LBS | RESPIRATION RATE: 16 BRPM | HEIGHT: 63 IN | HEART RATE: 79 BPM | BODY MASS INDEX: 23 KG/M2 | TEMPERATURE: 97.6 F | SYSTOLIC BLOOD PRESSURE: 122 MMHG | OXYGEN SATURATION: 94 %

## 2023-10-20 DIAGNOSIS — C83.07 MARGINAL ZONE LYMPHOMA OF SPLEEN: Primary | ICD-10-CM

## 2023-10-20 PROCEDURE — 96415 CHEMO IV INFUSION ADDL HR: CPT

## 2023-10-20 PROCEDURE — 25810000003 SODIUM CHLORIDE 0.9 % SOLUTION 250 ML FLEX CONT: Performed by: INTERNAL MEDICINE

## 2023-10-20 PROCEDURE — 25810000003 SODIUM CHLORIDE 0.9 % SOLUTION: Performed by: INTERNAL MEDICINE

## 2023-10-20 PROCEDURE — 25010000002 RITUXIMAB 10 MG/ML SOLUTION 10 ML VIAL: Performed by: INTERNAL MEDICINE

## 2023-10-20 PROCEDURE — 96413 CHEMO IV INFUSION 1 HR: CPT

## 2023-10-20 PROCEDURE — 96375 TX/PRO/DX INJ NEW DRUG ADDON: CPT

## 2023-10-20 PROCEDURE — 96367 TX/PROPH/DG ADDL SEQ IV INF: CPT

## 2023-10-20 PROCEDURE — 25010000002 RITUXIMAB 10 MG/ML SOLUTION 50 ML VIAL: Performed by: INTERNAL MEDICINE

## 2023-10-20 PROCEDURE — 25010000002 DIPHENHYDRAMINE PER 50 MG: Performed by: INTERNAL MEDICINE

## 2023-10-20 PROCEDURE — 25010000002 METHYLPREDNISOLONE PER 125 MG: Performed by: INTERNAL MEDICINE

## 2023-10-20 RX ORDER — FAMOTIDINE 10 MG/ML
20 INJECTION, SOLUTION INTRAVENOUS AS NEEDED
Status: DISCONTINUED | OUTPATIENT
Start: 2023-10-20 | End: 2023-10-20 | Stop reason: HOSPADM

## 2023-10-20 RX ORDER — ACETAMINOPHEN 325 MG/1
650 TABLET ORAL ONCE
Status: COMPLETED | OUTPATIENT
Start: 2023-10-20 | End: 2023-10-20

## 2023-10-20 RX ORDER — FAMOTIDINE 10 MG/ML
40 INJECTION, SOLUTION INTRAVENOUS ONCE
Status: COMPLETED | OUTPATIENT
Start: 2023-10-20 | End: 2023-10-20

## 2023-10-20 RX ORDER — MEPERIDINE HYDROCHLORIDE 50 MG/ML
25 INJECTION INTRAMUSCULAR; INTRAVENOUS; SUBCUTANEOUS
Status: DISCONTINUED | OUTPATIENT
Start: 2023-10-20 | End: 2023-10-20 | Stop reason: HOSPADM

## 2023-10-20 RX ORDER — METHYLPREDNISOLONE SODIUM SUCCINATE 125 MG/2ML
125 INJECTION, POWDER, LYOPHILIZED, FOR SOLUTION INTRAMUSCULAR; INTRAVENOUS ONCE
Status: COMPLETED | OUTPATIENT
Start: 2023-10-20 | End: 2023-10-20

## 2023-10-20 RX ORDER — DIPHENHYDRAMINE HYDROCHLORIDE 50 MG/ML
50 INJECTION INTRAMUSCULAR; INTRAVENOUS AS NEEDED
Status: DISCONTINUED | OUTPATIENT
Start: 2023-10-20 | End: 2023-10-20 | Stop reason: HOSPADM

## 2023-10-20 RX ORDER — SODIUM CHLORIDE 9 MG/ML
250 INJECTION, SOLUTION INTRAVENOUS ONCE
Status: COMPLETED | OUTPATIENT
Start: 2023-10-20 | End: 2023-10-20

## 2023-10-20 RX ADMIN — SODIUM CHLORIDE 250 ML: 9 INJECTION, SOLUTION INTRAVENOUS at 08:48

## 2023-10-20 RX ADMIN — RITUXIMAB 600 MG: 10 INJECTION, SOLUTION INTRAVENOUS at 09:17

## 2023-10-20 RX ADMIN — FAMOTIDINE 40 MG: 10 INJECTION INTRAVENOUS at 08:48

## 2023-10-20 RX ADMIN — DIPHENHYDRAMINE HYDROCHLORIDE 25 MG: 50 INJECTION, SOLUTION INTRAMUSCULAR; INTRAVENOUS at 08:55

## 2023-10-20 RX ADMIN — METHYLPREDNISOLONE SODIUM SUCCINATE 125 MG: 125 INJECTION, POWDER, LYOPHILIZED, FOR SOLUTION INTRAMUSCULAR; INTRAVENOUS at 08:50

## 2023-10-20 RX ADMIN — ACETAMINOPHEN 650 MG: 325 TABLET, FILM COATED ORAL at 08:48

## 2023-10-27 ENCOUNTER — TRANSCRIBE ORDERS (OUTPATIENT)
Dept: ADMINISTRATIVE | Facility: HOSPITAL | Age: 63
End: 2023-10-27
Payer: COMMERCIAL

## 2023-10-27 ENCOUNTER — LAB (OUTPATIENT)
Dept: LAB | Facility: HOSPITAL | Age: 63
End: 2023-10-27
Payer: COMMERCIAL

## 2023-10-27 ENCOUNTER — INFUSION (OUTPATIENT)
Dept: ONCOLOGY | Facility: HOSPITAL | Age: 63
End: 2023-10-27
Payer: COMMERCIAL

## 2023-10-27 VITALS
HEART RATE: 82 BPM | DIASTOLIC BLOOD PRESSURE: 47 MMHG | WEIGHT: 133 LBS | RESPIRATION RATE: 18 BRPM | SYSTOLIC BLOOD PRESSURE: 129 MMHG | HEIGHT: 63 IN | TEMPERATURE: 98 F | OXYGEN SATURATION: 96 % | BODY MASS INDEX: 23.57 KG/M2

## 2023-10-27 DIAGNOSIS — R16.1 SPLENOMEGALY: ICD-10-CM

## 2023-10-27 DIAGNOSIS — E53.8 BIOTIN-(PROPIONYL-COA-CARBOXYLASE) LIGASE DEFICIENCY: ICD-10-CM

## 2023-10-27 DIAGNOSIS — E55.9 AVITAMINOSIS D: ICD-10-CM

## 2023-10-27 DIAGNOSIS — E03.9 MYXEDEMA HEART DISEASE: ICD-10-CM

## 2023-10-27 DIAGNOSIS — C83.07 MARGINAL ZONE LYMPHOMA OF SPLEEN: Primary | ICD-10-CM

## 2023-10-27 DIAGNOSIS — D50.9 IRON DEFICIENCY ANEMIA, UNSPECIFIED IRON DEFICIENCY ANEMIA TYPE: ICD-10-CM

## 2023-10-27 DIAGNOSIS — C83.07 MARGINAL ZONE LYMPHOMA OF SPLEEN: ICD-10-CM

## 2023-10-27 DIAGNOSIS — I51.9 MYXEDEMA HEART DISEASE: ICD-10-CM

## 2023-10-27 DIAGNOSIS — E03.9 MYXEDEMA HEART DISEASE: Primary | ICD-10-CM

## 2023-10-27 DIAGNOSIS — I51.9 MYXEDEMA HEART DISEASE: Primary | ICD-10-CM

## 2023-10-27 LAB
25(OH)D3 SERPL-MCNC: 87.4 NG/ML (ref 30–100)
ALBUMIN SERPL-MCNC: 4.4 G/DL (ref 3.5–5.2)
ALBUMIN/GLOB SERPL: 2 G/DL
ALP SERPL-CCNC: 359 U/L (ref 39–117)
ALT SERPL W P-5'-P-CCNC: 70 U/L (ref 1–33)
ANION GAP SERPL CALCULATED.3IONS-SCNC: 11 MMOL/L (ref 5–15)
ANISOCYTOSIS BLD QL: ABNORMAL
AST SERPL-CCNC: 53 U/L (ref 1–32)
BILIRUB SERPL-MCNC: 0.4 MG/DL (ref 0–1.2)
BUN SERPL-MCNC: 14 MG/DL (ref 8–23)
BUN/CREAT SERPL: 29.8 (ref 7–25)
BURR CELLS BLD QL SMEAR: ABNORMAL
CALCIUM SPEC-SCNC: 9.4 MG/DL (ref 8.6–10.5)
CHLORIDE SERPL-SCNC: 101 MMOL/L (ref 98–107)
CO2 SERPL-SCNC: 27 MMOL/L (ref 22–29)
CREAT SERPL-MCNC: 0.47 MG/DL (ref 0.57–1)
DEPRECATED RDW RBC AUTO: 51.3 FL (ref 37–54)
EGFRCR SERPLBLD CKD-EPI 2021: 108.5 ML/MIN/1.73
EOSINOPHIL # BLD MANUAL: 0.1 10*3/MM3 (ref 0–0.4)
EOSINOPHIL NFR BLD MANUAL: 1 % (ref 0.3–6.2)
ERYTHROCYTE [DISTWIDTH] IN BLOOD BY AUTOMATED COUNT: 15.7 % (ref 12.3–15.4)
FOLATE SERPL-MCNC: >20 NG/ML (ref 4.78–24.2)
GIANT PLATELETS: ABNORMAL
GLOBULIN UR ELPH-MCNC: 2.2 GM/DL
GLUCOSE SERPL-MCNC: 162 MG/DL (ref 65–99)
HCT VFR BLD AUTO: 45 % (ref 34–46.6)
HGB BLD-MCNC: 13.8 G/DL (ref 12–15.9)
LYMPHOCYTES # BLD MANUAL: 5.55 10*3/MM3 (ref 0.7–3.1)
LYMPHOCYTES NFR BLD MANUAL: 9.3 % (ref 5–12)
MCH RBC QN AUTO: 27.6 PG (ref 26.6–33)
MCHC RBC AUTO-ENTMCNC: 30.7 G/DL (ref 31.5–35.7)
MCV RBC AUTO: 90 FL (ref 79–97)
MICROCYTES BLD QL: ABNORMAL
MONOCYTES # BLD: 0.96 10*3/MM3 (ref 0.1–0.9)
NEUTROPHILS # BLD AUTO: 3.73 10*3/MM3 (ref 1.7–7)
NEUTROPHILS NFR BLD MANUAL: 35.1 % (ref 42.7–76)
NEUTS BAND NFR BLD MANUAL: 1 % (ref 0–5)
PLATELET # BLD AUTO: 241 10*3/MM3 (ref 140–450)
PMV BLD AUTO: 12 FL (ref 6–12)
POIKILOCYTOSIS BLD QL SMEAR: ABNORMAL
POTASSIUM SERPL-SCNC: 4.1 MMOL/L (ref 3.5–5.2)
PROT SERPL-MCNC: 6.6 G/DL (ref 6–8.5)
RBC # BLD AUTO: 5 10*6/MM3 (ref 3.77–5.28)
SCHISTOCYTES BLD QL SMEAR: ABNORMAL
SODIUM SERPL-SCNC: 139 MMOL/L (ref 136–145)
T4 FREE SERPL-MCNC: 1.26 NG/DL (ref 0.93–1.7)
TSH SERPL DL<=0.05 MIU/L-ACNC: 1.38 UIU/ML (ref 0.27–4.2)
VARIANT LYMPHS NFR BLD MANUAL: 44.3 % (ref 19.6–45.3)
VARIANT LYMPHS NFR BLD MANUAL: 9.3 % (ref 0–5)
VIT B12 BLD-MCNC: 1107 PG/ML (ref 211–946)
WBC MORPH BLD: NORMAL
WBC NRBC COR # BLD: 10.35 10*3/MM3 (ref 3.4–10.8)

## 2023-10-27 PROCEDURE — 36415 COLL VENOUS BLD VENIPUNCTURE: CPT

## 2023-10-27 PROCEDURE — 82607 VITAMIN B-12: CPT

## 2023-10-27 PROCEDURE — 85007 BL SMEAR W/DIFF WBC COUNT: CPT

## 2023-10-27 PROCEDURE — 96415 CHEMO IV INFUSION ADDL HR: CPT

## 2023-10-27 PROCEDURE — 25010000002 DIPHENHYDRAMINE PER 50 MG: Performed by: INTERNAL MEDICINE

## 2023-10-27 PROCEDURE — 82306 VITAMIN D 25 HYDROXY: CPT

## 2023-10-27 PROCEDURE — 80050 GENERAL HEALTH PANEL: CPT

## 2023-10-27 PROCEDURE — 84439 ASSAY OF FREE THYROXINE: CPT

## 2023-10-27 PROCEDURE — 25810000003 SODIUM CHLORIDE 0.9 % SOLUTION: Performed by: INTERNAL MEDICINE

## 2023-10-27 PROCEDURE — 82746 ASSAY OF FOLIC ACID SERUM: CPT

## 2023-10-27 PROCEDURE — 25010000002 RITUXIMAB 10 MG/ML SOLUTION 10 ML VIAL: Performed by: INTERNAL MEDICINE

## 2023-10-27 PROCEDURE — 25810000003 SODIUM CHLORIDE 0.9 % SOLUTION 250 ML FLEX CONT: Performed by: INTERNAL MEDICINE

## 2023-10-27 PROCEDURE — 96375 TX/PRO/DX INJ NEW DRUG ADDON: CPT

## 2023-10-27 PROCEDURE — 96413 CHEMO IV INFUSION 1 HR: CPT

## 2023-10-27 PROCEDURE — 25010000002 RITUXIMAB 10 MG/ML SOLUTION 50 ML VIAL: Performed by: INTERNAL MEDICINE

## 2023-10-27 PROCEDURE — 96367 TX/PROPH/DG ADDL SEQ IV INF: CPT

## 2023-10-27 PROCEDURE — 25010000002 METHYLPREDNISOLONE PER 125 MG: Performed by: INTERNAL MEDICINE

## 2023-10-27 RX ORDER — ACETAMINOPHEN 325 MG/1
650 TABLET ORAL ONCE
Status: COMPLETED | OUTPATIENT
Start: 2023-10-27 | End: 2023-10-27

## 2023-10-27 RX ORDER — SODIUM CHLORIDE 9 MG/ML
250 INJECTION, SOLUTION INTRAVENOUS ONCE
Status: COMPLETED | OUTPATIENT
Start: 2023-10-27 | End: 2023-10-27

## 2023-10-27 RX ORDER — DIPHENHYDRAMINE HYDROCHLORIDE 50 MG/ML
50 INJECTION INTRAMUSCULAR; INTRAVENOUS AS NEEDED
Status: DISCONTINUED | OUTPATIENT
Start: 2023-10-27 | End: 2023-10-27 | Stop reason: HOSPADM

## 2023-10-27 RX ORDER — METHYLPREDNISOLONE SODIUM SUCCINATE 125 MG/2ML
125 INJECTION, POWDER, LYOPHILIZED, FOR SOLUTION INTRAMUSCULAR; INTRAVENOUS ONCE
Status: COMPLETED | OUTPATIENT
Start: 2023-10-27 | End: 2023-10-27

## 2023-10-27 RX ORDER — MEPERIDINE HYDROCHLORIDE 50 MG/ML
25 INJECTION INTRAMUSCULAR; INTRAVENOUS; SUBCUTANEOUS
Status: DISCONTINUED | OUTPATIENT
Start: 2023-10-27 | End: 2023-10-27 | Stop reason: HOSPADM

## 2023-10-27 RX ORDER — FAMOTIDINE 10 MG/ML
20 INJECTION, SOLUTION INTRAVENOUS AS NEEDED
Status: DISCONTINUED | OUTPATIENT
Start: 2023-10-27 | End: 2023-10-27 | Stop reason: HOSPADM

## 2023-10-27 RX ADMIN — METHYLPREDNISOLONE SODIUM SUCCINATE 125 MG: 125 INJECTION, POWDER, LYOPHILIZED, FOR SOLUTION INTRAMUSCULAR; INTRAVENOUS at 09:31

## 2023-10-27 RX ADMIN — ACETAMINOPHEN 650 MG: 325 TABLET, FILM COATED ORAL at 09:31

## 2023-10-27 RX ADMIN — DIPHENHYDRAMINE HYDROCHLORIDE 25 MG: 50 INJECTION, SOLUTION INTRAMUSCULAR; INTRAVENOUS at 09:33

## 2023-10-27 RX ADMIN — SODIUM CHLORIDE 250 ML: 9 INJECTION, SOLUTION INTRAVENOUS at 09:30

## 2023-10-27 RX ADMIN — RITUXIMAB 600 MG: 10 INJECTION, SOLUTION INTRAVENOUS at 10:01

## 2023-11-03 ENCOUNTER — LAB (OUTPATIENT)
Dept: LAB | Facility: HOSPITAL | Age: 63
End: 2023-11-03
Payer: COMMERCIAL

## 2023-11-03 ENCOUNTER — OFFICE VISIT (OUTPATIENT)
Dept: ONCOLOGY | Facility: CLINIC | Age: 63
End: 2023-11-03
Payer: COMMERCIAL

## 2023-11-03 ENCOUNTER — INFUSION (OUTPATIENT)
Dept: ONCOLOGY | Facility: HOSPITAL | Age: 63
End: 2023-11-03
Payer: COMMERCIAL

## 2023-11-03 VITALS
TEMPERATURE: 98.1 F | OXYGEN SATURATION: 99 % | HEIGHT: 63 IN | SYSTOLIC BLOOD PRESSURE: 118 MMHG | BODY MASS INDEX: 24.1 KG/M2 | DIASTOLIC BLOOD PRESSURE: 48 MMHG | HEART RATE: 70 BPM | WEIGHT: 136 LBS | RESPIRATION RATE: 18 BRPM

## 2023-11-03 VITALS
OXYGEN SATURATION: 98 % | SYSTOLIC BLOOD PRESSURE: 136 MMHG | HEIGHT: 63 IN | TEMPERATURE: 98 F | WEIGHT: 136 LBS | RESPIRATION RATE: 18 BRPM | BODY MASS INDEX: 24.1 KG/M2 | HEART RATE: 83 BPM | DIASTOLIC BLOOD PRESSURE: 67 MMHG

## 2023-11-03 DIAGNOSIS — C83.07 MARGINAL ZONE LYMPHOMA OF SPLEEN: Primary | ICD-10-CM

## 2023-11-03 DIAGNOSIS — R16.1 SPLENOMEGALY: ICD-10-CM

## 2023-11-03 DIAGNOSIS — D50.9 IRON DEFICIENCY ANEMIA, UNSPECIFIED IRON DEFICIENCY ANEMIA TYPE: ICD-10-CM

## 2023-11-03 LAB
ALBUMIN SERPL-MCNC: 4.5 G/DL (ref 3.5–5.2)
ALBUMIN/GLOB SERPL: 2 G/DL
ALP SERPL-CCNC: 365 U/L (ref 39–117)
ALT SERPL W P-5'-P-CCNC: 97 U/L (ref 1–33)
ANION GAP SERPL CALCULATED.3IONS-SCNC: 10 MMOL/L (ref 5–15)
ANISOCYTOSIS BLD QL: ABNORMAL
AST SERPL-CCNC: 56 U/L (ref 1–32)
BILIRUB SERPL-MCNC: 0.4 MG/DL (ref 0–1.2)
BUN SERPL-MCNC: 12 MG/DL (ref 8–23)
BUN/CREAT SERPL: 27.3 (ref 7–25)
BURR CELLS BLD QL SMEAR: ABNORMAL
CALCIUM SPEC-SCNC: 9.9 MG/DL (ref 8.6–10.5)
CHLORIDE SERPL-SCNC: 100 MMOL/L (ref 98–107)
CO2 SERPL-SCNC: 30 MMOL/L (ref 22–29)
CREAT SERPL-MCNC: 0.44 MG/DL (ref 0.57–1)
DEPRECATED RDW RBC AUTO: 52 FL (ref 37–54)
EGFRCR SERPLBLD CKD-EPI 2021: 110.2 ML/MIN/1.73
EOSINOPHIL # BLD MANUAL: 0.3 10*3/MM3 (ref 0–0.4)
EOSINOPHIL NFR BLD MANUAL: 3 % (ref 0.3–6.2)
ERYTHROCYTE [DISTWIDTH] IN BLOOD BY AUTOMATED COUNT: 15.8 % (ref 12.3–15.4)
GIANT PLATELETS: ABNORMAL
GLOBULIN UR ELPH-MCNC: 2.3 GM/DL
GLUCOSE SERPL-MCNC: 158 MG/DL (ref 65–99)
HCT VFR BLD AUTO: 44.3 % (ref 34–46.6)
HGB BLD-MCNC: 13.7 G/DL (ref 12–15.9)
HOLD SPECIMEN: NORMAL
LYMPHOCYTES # BLD MANUAL: 5.98 10*3/MM3 (ref 0.7–3.1)
LYMPHOCYTES NFR BLD MANUAL: 10 % (ref 5–12)
MCH RBC QN AUTO: 27.7 PG (ref 26.6–33)
MCHC RBC AUTO-ENTMCNC: 30.9 G/DL (ref 31.5–35.7)
MCV RBC AUTO: 89.5 FL (ref 79–97)
MONOCYTES # BLD: 1 10*3/MM3 (ref 0.1–0.9)
NEUTROPHILS # BLD AUTO: 2.69 10*3/MM3 (ref 1.7–7)
NEUTROPHILS NFR BLD MANUAL: 27 % (ref 42.7–76)
PLATELET # BLD AUTO: 250 10*3/MM3 (ref 140–450)
PMV BLD AUTO: 11.3 FL (ref 6–12)
POIKILOCYTOSIS BLD QL SMEAR: ABNORMAL
POTASSIUM SERPL-SCNC: 4.5 MMOL/L (ref 3.5–5.2)
PROT SERPL-MCNC: 6.8 G/DL (ref 6–8.5)
RBC # BLD AUTO: 4.95 10*6/MM3 (ref 3.77–5.28)
SODIUM SERPL-SCNC: 140 MMOL/L (ref 136–145)
VARIANT LYMPHS NFR BLD MANUAL: 30 % (ref 0–5)
VARIANT LYMPHS NFR BLD MANUAL: 30 % (ref 19.6–45.3)
WBC MORPH BLD: NORMAL
WBC NRBC COR # BLD: 9.97 10*3/MM3 (ref 3.4–10.8)

## 2023-11-03 PROCEDURE — 36415 COLL VENOUS BLD VENIPUNCTURE: CPT

## 2023-11-03 PROCEDURE — 96375 TX/PRO/DX INJ NEW DRUG ADDON: CPT

## 2023-11-03 PROCEDURE — 25010000002 METHYLPREDNISOLONE PER 125 MG: Performed by: INTERNAL MEDICINE

## 2023-11-03 PROCEDURE — 96367 TX/PROPH/DG ADDL SEQ IV INF: CPT

## 2023-11-03 PROCEDURE — 25010000002 RITUXIMAB 10 MG/ML SOLUTION 50 ML VIAL: Performed by: INTERNAL MEDICINE

## 2023-11-03 PROCEDURE — 85007 BL SMEAR W/DIFF WBC COUNT: CPT

## 2023-11-03 PROCEDURE — 25810000003 SODIUM CHLORIDE 0.9 % SOLUTION 250 ML FLEX CONT: Performed by: INTERNAL MEDICINE

## 2023-11-03 PROCEDURE — 96415 CHEMO IV INFUSION ADDL HR: CPT

## 2023-11-03 PROCEDURE — 96413 CHEMO IV INFUSION 1 HR: CPT

## 2023-11-03 PROCEDURE — 80053 COMPREHEN METABOLIC PANEL: CPT

## 2023-11-03 PROCEDURE — 85025 COMPLETE CBC W/AUTO DIFF WBC: CPT

## 2023-11-03 PROCEDURE — 25010000002 RITUXIMAB 10 MG/ML SOLUTION 10 ML VIAL: Performed by: INTERNAL MEDICINE

## 2023-11-03 PROCEDURE — 25010000002 DIPHENHYDRAMINE PER 50 MG: Performed by: INTERNAL MEDICINE

## 2023-11-03 PROCEDURE — 25810000003 SODIUM CHLORIDE 0.9 % SOLUTION: Performed by: INTERNAL MEDICINE

## 2023-11-03 RX ORDER — DIPHENHYDRAMINE HYDROCHLORIDE 50 MG/ML
50 INJECTION INTRAMUSCULAR; INTRAVENOUS AS NEEDED
Status: DISCONTINUED | OUTPATIENT
Start: 2023-11-03 | End: 2023-11-03 | Stop reason: HOSPADM

## 2023-11-03 RX ORDER — MEPERIDINE HYDROCHLORIDE 50 MG/ML
25 INJECTION INTRAMUSCULAR; INTRAVENOUS; SUBCUTANEOUS
Status: DISCONTINUED | OUTPATIENT
Start: 2023-11-03 | End: 2023-11-03 | Stop reason: HOSPADM

## 2023-11-03 RX ORDER — ACETAMINOPHEN 325 MG/1
650 TABLET ORAL ONCE
Status: COMPLETED | OUTPATIENT
Start: 2023-11-03 | End: 2023-11-03

## 2023-11-03 RX ORDER — FAMOTIDINE 10 MG/ML
20 INJECTION, SOLUTION INTRAVENOUS AS NEEDED
Status: DISCONTINUED | OUTPATIENT
Start: 2023-11-03 | End: 2023-11-03 | Stop reason: HOSPADM

## 2023-11-03 RX ORDER — METHYLPREDNISOLONE SODIUM SUCCINATE 125 MG/2ML
125 INJECTION, POWDER, LYOPHILIZED, FOR SOLUTION INTRAMUSCULAR; INTRAVENOUS ONCE
Status: COMPLETED | OUTPATIENT
Start: 2023-11-03 | End: 2023-11-03

## 2023-11-03 RX ORDER — SODIUM CHLORIDE 9 MG/ML
250 INJECTION, SOLUTION INTRAVENOUS ONCE
Status: COMPLETED | OUTPATIENT
Start: 2023-11-03 | End: 2023-11-03

## 2023-11-03 RX ADMIN — ACETAMINOPHEN 650 MG: 325 TABLET, FILM COATED ORAL at 09:01

## 2023-11-03 RX ADMIN — DIPHENHYDRAMINE HYDROCHLORIDE 25 MG: 50 INJECTION, SOLUTION INTRAMUSCULAR; INTRAVENOUS at 09:03

## 2023-11-03 RX ADMIN — METHYLPREDNISOLONE SODIUM SUCCINATE 125 MG: 125 INJECTION, POWDER, LYOPHILIZED, FOR SOLUTION INTRAMUSCULAR; INTRAVENOUS at 09:01

## 2023-11-03 RX ADMIN — RITUXIMAB 600 MG: 10 INJECTION, SOLUTION INTRAVENOUS at 09:26

## 2023-11-03 RX ADMIN — SODIUM CHLORIDE 250 ML: 9 INJECTION, SOLUTION INTRAVENOUS at 08:53

## 2023-11-10 ENCOUNTER — INFUSION (OUTPATIENT)
Dept: ONCOLOGY | Facility: HOSPITAL | Age: 63
End: 2023-11-10
Payer: COMMERCIAL

## 2023-11-10 ENCOUNTER — LAB (OUTPATIENT)
Dept: LAB | Facility: HOSPITAL | Age: 63
End: 2023-11-10
Payer: COMMERCIAL

## 2023-11-10 VITALS
SYSTOLIC BLOOD PRESSURE: 132 MMHG | TEMPERATURE: 98.5 F | HEIGHT: 63 IN | OXYGEN SATURATION: 99 % | BODY MASS INDEX: 24.45 KG/M2 | DIASTOLIC BLOOD PRESSURE: 60 MMHG | HEART RATE: 92 BPM | WEIGHT: 138 LBS | RESPIRATION RATE: 18 BRPM

## 2023-11-10 DIAGNOSIS — D61.818 PANCYTOPENIA, ACQUIRED: ICD-10-CM

## 2023-11-10 DIAGNOSIS — C83.07 MARGINAL ZONE LYMPHOMA OF SPLEEN: ICD-10-CM

## 2023-11-10 DIAGNOSIS — C83.07 MARGINAL ZONE LYMPHOMA OF SPLEEN: Primary | ICD-10-CM

## 2023-11-10 LAB
ALBUMIN SERPL-MCNC: 4.3 G/DL (ref 3.5–5.2)
ALBUMIN/GLOB SERPL: 2 G/DL
ALP SERPL-CCNC: 342 U/L (ref 39–117)
ALT SERPL W P-5'-P-CCNC: 72 U/L (ref 1–33)
ANION GAP SERPL CALCULATED.3IONS-SCNC: 10 MMOL/L (ref 5–15)
ANISOCYTOSIS BLD QL: ABNORMAL
AST SERPL-CCNC: 43 U/L (ref 1–32)
BILIRUB SERPL-MCNC: 0.3 MG/DL (ref 0–1.2)
BUN SERPL-MCNC: 15 MG/DL (ref 8–23)
BUN/CREAT SERPL: 30 (ref 7–25)
CALCIUM SPEC-SCNC: 9.6 MG/DL (ref 8.6–10.5)
CHLORIDE SERPL-SCNC: 103 MMOL/L (ref 98–107)
CO2 SERPL-SCNC: 28 MMOL/L (ref 22–29)
CREAT SERPL-MCNC: 0.5 MG/DL (ref 0.57–1)
DEPRECATED RDW RBC AUTO: 50.7 FL (ref 37–54)
EGFRCR SERPLBLD CKD-EPI 2021: 106.9 ML/MIN/1.73
EOSINOPHIL # BLD MANUAL: 0.17 10*3/MM3 (ref 0–0.4)
EOSINOPHIL NFR BLD MANUAL: 2.1 % (ref 0.3–6.2)
ERYTHROCYTE [DISTWIDTH] IN BLOOD BY AUTOMATED COUNT: 15.8 % (ref 12.3–15.4)
GIANT PLATELETS: ABNORMAL
GLOBULIN UR ELPH-MCNC: 2.2 GM/DL
GLUCOSE SERPL-MCNC: 142 MG/DL (ref 65–99)
HCT VFR BLD AUTO: 43.9 % (ref 34–46.6)
HGB BLD-MCNC: 13.8 G/DL (ref 12–15.9)
LYMPHOCYTES # BLD MANUAL: 4.41 10*3/MM3 (ref 0.7–3.1)
LYMPHOCYTES NFR BLD MANUAL: 10.5 % (ref 5–12)
MCH RBC QN AUTO: 27.7 PG (ref 26.6–33)
MCHC RBC AUTO-ENTMCNC: 31.4 G/DL (ref 31.5–35.7)
MCV RBC AUTO: 88 FL (ref 79–97)
METAMYELOCYTES NFR BLD MANUAL: 1.1 % (ref 0–0)
MONOCYTES # BLD: 0.83 10*3/MM3 (ref 0.1–0.9)
NEUTROPHILS # BLD AUTO: 2.41 10*3/MM3 (ref 1.7–7)
NEUTROPHILS NFR BLD MANUAL: 29.5 % (ref 42.7–76)
NEUTS BAND NFR BLD MANUAL: 1.1 % (ref 0–5)
PLATELET # BLD AUTO: 266 10*3/MM3 (ref 140–450)
PMV BLD AUTO: 10.8 FL (ref 6–12)
POIKILOCYTOSIS BLD QL SMEAR: ABNORMAL
POTASSIUM SERPL-SCNC: 4.3 MMOL/L (ref 3.5–5.2)
PROT SERPL-MCNC: 6.5 G/DL (ref 6–8.5)
RBC # BLD AUTO: 4.99 10*6/MM3 (ref 3.77–5.28)
SMUDGE CELLS BLD QL SMEAR: ABNORMAL
SODIUM SERPL-SCNC: 141 MMOL/L (ref 136–145)
VARIANT LYMPHS NFR BLD MANUAL: 27.4 % (ref 0–5)
VARIANT LYMPHS NFR BLD MANUAL: 28.4 % (ref 19.6–45.3)
WBC NRBC COR # BLD: 7.91 10*3/MM3 (ref 3.4–10.8)

## 2023-11-10 PROCEDURE — 25010000002 RITUXIMAB 10 MG/ML SOLUTION 10 ML VIAL: Performed by: INTERNAL MEDICINE

## 2023-11-10 PROCEDURE — 80053 COMPREHEN METABOLIC PANEL: CPT

## 2023-11-10 PROCEDURE — 25810000003 SODIUM CHLORIDE 0.9 % SOLUTION: Performed by: INTERNAL MEDICINE

## 2023-11-10 PROCEDURE — 25010000002 DIPHENHYDRAMINE PER 50 MG: Performed by: INTERNAL MEDICINE

## 2023-11-10 PROCEDURE — 25010000002 METHYLPREDNISOLONE PER 125 MG: Performed by: INTERNAL MEDICINE

## 2023-11-10 PROCEDURE — 96415 CHEMO IV INFUSION ADDL HR: CPT

## 2023-11-10 PROCEDURE — 96413 CHEMO IV INFUSION 1 HR: CPT

## 2023-11-10 PROCEDURE — 25810000003 SODIUM CHLORIDE 0.9 % SOLUTION 250 ML FLEX CONT: Performed by: INTERNAL MEDICINE

## 2023-11-10 PROCEDURE — 85025 COMPLETE CBC W/AUTO DIFF WBC: CPT

## 2023-11-10 PROCEDURE — 96367 TX/PROPH/DG ADDL SEQ IV INF: CPT

## 2023-11-10 PROCEDURE — 25010000002 RITUXIMAB 10 MG/ML SOLUTION 50 ML VIAL: Performed by: INTERNAL MEDICINE

## 2023-11-10 PROCEDURE — 36415 COLL VENOUS BLD VENIPUNCTURE: CPT

## 2023-11-10 PROCEDURE — 85007 BL SMEAR W/DIFF WBC COUNT: CPT

## 2023-11-10 PROCEDURE — 96375 TX/PRO/DX INJ NEW DRUG ADDON: CPT

## 2023-11-10 RX ORDER — DIPHENHYDRAMINE HYDROCHLORIDE 50 MG/ML
50 INJECTION INTRAMUSCULAR; INTRAVENOUS AS NEEDED
Status: DISCONTINUED | OUTPATIENT
Start: 2023-11-10 | End: 2023-11-10 | Stop reason: HOSPADM

## 2023-11-10 RX ORDER — METHYLPREDNISOLONE SODIUM SUCCINATE 125 MG/2ML
125 INJECTION, POWDER, LYOPHILIZED, FOR SOLUTION INTRAMUSCULAR; INTRAVENOUS ONCE
Status: COMPLETED | OUTPATIENT
Start: 2023-11-10 | End: 2023-11-10

## 2023-11-10 RX ORDER — SODIUM CHLORIDE 9 MG/ML
250 INJECTION, SOLUTION INTRAVENOUS ONCE
Status: COMPLETED | OUTPATIENT
Start: 2023-11-10 | End: 2023-11-10

## 2023-11-10 RX ORDER — MEPERIDINE HYDROCHLORIDE 50 MG/ML
25 INJECTION INTRAMUSCULAR; INTRAVENOUS; SUBCUTANEOUS
Status: DISCONTINUED | OUTPATIENT
Start: 2023-11-10 | End: 2023-11-10 | Stop reason: HOSPADM

## 2023-11-10 RX ORDER — ACETAMINOPHEN 325 MG/1
650 TABLET ORAL ONCE
Status: COMPLETED | OUTPATIENT
Start: 2023-11-10 | End: 2023-11-10

## 2023-11-10 RX ORDER — FAMOTIDINE 10 MG/ML
20 INJECTION, SOLUTION INTRAVENOUS AS NEEDED
Status: DISCONTINUED | OUTPATIENT
Start: 2023-11-10 | End: 2023-11-10 | Stop reason: HOSPADM

## 2023-11-10 RX ADMIN — METHYLPREDNISOLONE SODIUM SUCCINATE 125 MG: 125 INJECTION, POWDER, LYOPHILIZED, FOR SOLUTION INTRAMUSCULAR; INTRAVENOUS at 09:06

## 2023-11-10 RX ADMIN — DIPHENHYDRAMINE HYDROCHLORIDE 25 MG: 50 INJECTION, SOLUTION INTRAMUSCULAR; INTRAVENOUS at 09:08

## 2023-11-10 RX ADMIN — SODIUM CHLORIDE 250 ML: 9 INJECTION, SOLUTION INTRAVENOUS at 09:05

## 2023-11-10 RX ADMIN — ACETAMINOPHEN 650 MG: 325 TABLET, FILM COATED ORAL at 09:05

## 2023-11-10 RX ADMIN — RITUXIMAB 600 MG: 10 INJECTION, SOLUTION INTRAVENOUS at 09:32

## 2023-11-15 NOTE — PROGRESS NOTES
[facility] HEMATOLOGY & ONCOLOGY  [address]  [facility] [address] [phone]    Patient Name: Lucía Wolff  Encounter Date: 12/01/2023  YOB: 1960  Patient Number: [id number]      REASON FOR FOLLOW-UP: Lucía Wolff is a pleasant 63 y.o. 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  Her bone marrow showed B-cell lymphoma. She is also seen for splenomegaly secondary to  B-cell lymphoma.  She is seen 3 weeks post rituximab weekly. She is seen alone. History obtained from the patient.  History is considered reliable.           Oncology/Hematology History Overview Note   DIAGNOSTIC ABNORMALITIES:  CBC 01/17/2022. Remarkable for platelet 97. Bilirubin 1.9.   CT neck 01/19/2022.  No cervical lymphadenopathy by size criteria.  Asymmetric appearance of the left submandibular gland. Correlation for sialoadenitis recommended. If indicated, dedicated ultrasound may be Performed.  CT chest 01/19/2022. No intrathoracic lymphadenopathy. No axillary lymphadenopathy  CT abdomen and pelvis 01/19/2022. Massive splenomegaly.  Splenic artery aneurysm, 1.5 cm.  No additional abdominal or pelvic lymphadenopathy.  PET 01/25/2022. No hypermetabolic lymphadenopathy or tumor. No scintigraphic evidence of malignancy. . Persistent prominent splenomegaly.  Recommendations from [place] on 05/12/2022.  The palpable splenomegaly in the absence of additional findings to suspect avid adenopathy and liver disease suggested the patient has hypersplenism.  Other differential include nutritional deficiencies or cyclical neutropenia.  The enlarging spleen is not significantly PET avid therefore splenectomy would not be recommended at this time. Other reason is infection with postsplenectomy state. Would recommend splenectomy if the patient continues to develop new symptoms for example losing another 10 pounds in 6 months,  recurrent major infections, transfusion dependent cytopenias or splenic infarcts.   JAK2 was negative, normal copper at 123.2, B12 at 748, reticulocyte 4%, negative hepatitis C antibody, uric acid 8, WBC 3.8, hemoglobin 12.4, MCV 89, platelet 116, bilirubin 2.5, , and ferritin 262 at [place].   Seen by ANAIS Hamm on 7/27/2023.  Plan for splenectomy at [place].  Pathology report 8/14/2023.  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  NYD 88 negative.  PET scan from [place] on 9/22/2023.  No evidence for FDG avid lymphoma.  Increased size of a moderate right pleural effusion when compared to 8/18/2023, with resolution of the left pleural effusion.    Note from  on 9/26/2023.  Patient felt well and healed from surgery.  PET scan 9/22/2023 failed to show significant adenopathy.  WBC 8, ANC 2.96, hemoglobin 14.1, hematocrit 46, MCV 92 and platelet 194.  CMP remarkable for alkaline phosphatase 733, AST 76 and ALT 89.  Ferritin 1643.  .  Uric acid 5.  Discussed with patient that she is no evidence of disease but not cured of the marginal zone lymphoma because anticipate marrow will remain positive and margins of lymphoma can have late relapse.  We can observe until symptoms arise and then give zanabrutinib or we can give 4 doses of rituximab to deepen the response.  Patient wants to get rituximab locally.  Follow-up in 3 months.  PET scan 1 month after fourth dose of rituximab         PREVIOUS INTERVENTIONS:  Ferrous sulfate 4/11/2023 to 4/24/2023, intolerant, severe constipation and GI upset.  Venofer 200 mg 5/8/2023 and 5/15/2023.  Rituximab 10/16/2023 with hypersensitivity reaction, 5 minutes into infusion.  Rituximab 10/20/2023, 10/27/2023, 11/3/2023 and 11/10/2023.     Marginal zone lymphoma of spleen   3/28/2022 Initial Diagnosis    Marginal zone lymphoma of spleen (HCC)     10/20/2023 -  Chemotherapy    OP LYMPHOMA (CLL) RiTUXimab  "(Weekly X 4)         PAST MEDICAL HISTORY:  ALLERGIES:  No Known Allergies  CURRENT MEDICATIONS:  Outpatient Encounter Medications as of 12/1/2023   Medication Sig Dispense Refill    acetaminophen (TYLENOL) 325 MG tablet Take 2 tablets by mouth Every 6 (Six) Hours As Needed for Mild Pain.      calcium carbonate (OS-MITRA) 600 MG tablet Take 1 tablet by mouth 2 (Two) Times a Day With Meals.      Cholecalciferol (vitamin D3) 125 MCG (5000 UT) capsule capsule Take 1 capsule by mouth Daily.      Eliquis 5 MG tablet tablet Take  by mouth 2 (Two) Times a Day.      folic acid (FOLVITE) 1 MG tablet Take 1 tablet by mouth Daily.      metFORMIN (GLUCOPHAGE) 500 MG tablet Take 1 tablet by mouth 2 (Two) Times a Day With Meals.      Multiple Vitamins-Minerals (MULTIVITAMIN WITH MINERALS) tablet tablet Take 1 tablet by mouth Daily.      Omega-3 300 MG capsule Take 4 capsules by mouth Daily.      omeprazole (priLOSEC) 20 MG capsule       pantoprazole (PROTONIX) 40 MG EC tablet Take 1 tablet every day by oral route.      Semaglutide (OZEMPIC, 0.25 OR 0.5 MG/DOSE, SC) Inject  under the skin into the appropriate area as directed.      Syringe 25G X 1\" 3 ML misc USE 1 SYRINGE PER B12 INJECTION 12 each 0    Thyroid 65 MG PO tablet Take 1 tablet by mouth Daily.      VIT B12-METHIONINE-INOS-CHOL IM Inject 1 mL into the appropriate muscle as directed by prescriber Every 30 (Thirty) Days. Has not taken since 2020 at least       [DISCONTINUED] cyanocobalamin 1000 MCG/ML injection INJECT 1 ML EVERY 28 DAYS. (Patient not taking: Reported on 12/1/2023) 12 mL 0    [DISCONTINUED] Dextromethorphan-guaiFENesin (MUCINEX DM PO) Take 1 tablet by mouth 2 (Two) Times a Day As Needed. (Patient not taking: Reported on 12/1/2023)      [DISCONTINUED] gabapentin (NEURONTIN) 300 MG capsule Take 1 capsule 3 times a day by oral route for 8 days. (Patient not taking: Reported on 12/1/2023)      [DISCONTINUED] ondansetron (Zofran) 8 MG tablet Take 1 tablet by " mouth Every 8 (Eight) Hours As Needed for Nausea or Vomiting. (Patient not taking: Reported on 12/1/2023) 30 tablet 1    [DISCONTINUED] prochlorperazine (COMPAZINE) 10 MG tablet Take 1 tablet by mouth Every 6 (Six) Hours As Needed for Nausea or Vomiting. (Patient not taking: Reported on 12/1/2023) 60 tablet 1     No facility-administered encounter medications on file as of 12/1/2023.     ADULT ILLNESSES:  Patient Active Problem List   Diagnosis Code    Heart murmur R01.1    Thrombocytopenia D69.6    Marginal zone lymphoma of spleen C83.07    Splenomegaly R16.1    Pancytopenia, acquired D61.818    Iron deficiency anemia D50.9    Intestinal malabsorption, unspecified K90.9    Transfusion reaction T80.92XA     SURGERIES:  Past Surgical History:   Procedure Laterality Date    CHOLECYSTECTOMY      HYSTERECTOMY  2003    PARTIAL    LIVER BIOPSY  08/12/2019     HEALTH MAINTENANCE ITEMS:  Health Maintenance Due   Topic Date Due    URINE MICROALBUMIN  Never done    COLORECTAL CANCER SCREENING  Never done    Pneumococcal Vaccine 0-64 (1 - PCV) Never done    TDAP/TD VACCINES (1 - Tdap) Never done    ZOSTER VACCINE (1 of 2) Never done    ANNUAL PHYSICAL  Never done    DIABETIC FOOT EXAM  Never done    PAP SMEAR  Never done    DIABETIC EYE EXAM  Never done    MAMMOGRAM  11/01/2020    Hepatitis B (1 of 3 - Risk 3-dose series) Never done    INFLUENZA VACCINE  08/01/2023    COVID-19 Vaccine (4 - 2023-24 season) 09/01/2023       <no information>  Last Completed Colonoscopy       This patient has no relevant Health Maintenance data.          There is no immunization history for the selected administration types on file for this patient.  Last Completed Mammogram       This patient has no relevant Health Maintenance data.              FAMILY HISTORY:  Family History   Problem Relation Age of Onset    Alzheimer's disease Mother     Diabetes Father     Stroke Father     Cardiomyopathy Father     Heart attack Father     Heart disease  "Father     Hypertension Father     Cancer Brother         She is unclear of the kind of cancer    Diabetes Brother     Diabetes Brother     Hypertension Brother     No Known Problems Brother     No Known Problems Son     No Known Problems Son     No Known Problems Daughter      SOCIAL HISTORY:  Social History     Socioeconomic History    Marital status:    Tobacco Use    Smoking status: Never    Smokeless tobacco: Never    Tobacco comments:     has tried it but did not keep smoking   Vaping Use    Vaping Use: Never used   Substance and Sexual Activity    Alcohol use: Never    Drug use: No    Sexual activity: Defer       REVIEW OF SYSTEMS:    Review of Systems   Constitutional:  Positive for fatigue. Negative for fever and unexpected weight change.        \"85 to 90%.\"   HENT:  Negative for congestion and mouth sores.    Eyes:  Negative for redness and visual disturbance.   Respiratory:  Negative for shortness of breath and wheezing.    Cardiovascular:  Negative for chest pain and leg swelling.   Gastrointestinal:  Negative for abdominal pain, nausea and vomiting.   Endocrine: Negative for polydipsia and polyphagia.   Genitourinary:  Negative for difficulty urinating, dysuria and flank pain.   Musculoskeletal:  Negative for gait problem and joint swelling.   Skin:  Negative for pallor.   Allergic/Immunologic: Negative for food allergies.   Neurological:  Negative for dizziness and speech difficulty.   Hematological:  Negative for adenopathy. Does not bruise/bleed easily.   Psychiatric/Behavioral:  Negative for agitation, confusion and hallucinations.        VITAL SIGNS: /64   Pulse 64   Temp 97.7 °F (36.5 °C) (Tympanic)   Resp 18   Ht 160 cm (63\")   Wt 62.3 kg (137 lb 6.4 oz)   SpO2 96%   BMI 24.34 kg/m²   Pain Score    12/01/23 1024   PainSc: 0-No pain       PHYSICAL EXAMINATION:     Physical Exam  Vitals reviewed.   Constitutional:       General: She is not in acute distress.  HENT:      Head: " Normocephalic and atraumatic.   Eyes:      General: No scleral icterus.  Cardiovascular:      Rate and Rhythm: Normal rate.   Pulmonary:      Effort: No respiratory distress.      Breath sounds: No wheezing.   Abdominal:      General: Bowel sounds are normal.      Palpations: Abdomen is soft.      Tenderness: There is no abdominal tenderness.   Musculoskeletal:         General: No swelling.      Cervical back: Neck supple.   Skin:     General: Skin is warm.      Coloration: Skin is not pale.   Neurological:      Mental Status: She is alert and oriented to person, place, and time.   Psychiatric:         Mood and Affect: Mood normal.         Behavior: Behavior normal.         Thought Content: Thought content normal.         Judgment: Judgment normal.         LABS    Lab Results - Last 18 Months   Lab Units 12/01/23  1015 11/10/23  0753 11/03/23  0742 10/27/23  0814 10/16/23  0812 10/06/23  1059 08/10/23  0953 08/03/23  1020 07/13/23  1309 07/11/23  1021 06/15/23  0745 04/11/23  1348 02/28/23  0852 01/10/23  1344 11/22/22  0937 10/11/22  1258 08/03/22  1036   HEMOGLOBIN g/dL 13.5 13.8 13.7 13.8 14.4 13.9 6.1* 5.5*   < > 6.6* 7.1*   < > 9.2* 12.7 10.2* 10.1* 8.6*   HEMATOCRIT % 42.0 43.9 44.3 45.0 47.2* 45.6 20.4* 18.2*   < > 22.8* 23.9*   < > 29.2* 38.0 31.4* 31.7* 27.4*   MCV fL 85.0 88.0 89.5 90.0 90.1 91.9 95.3 94.3   < > 96.6 94.1   < > 91.3 86.6 89.2 90.1 90.4   WBC 10*3/mm3 8.10 7.91 9.97 10.35 8.82 9.14 1.62* 1.29*   < > 1.11* 0.89*   < > 1.73* 4.07 2.58* 2.29* 1.88*   RDW % 14.5 15.8* 15.8* 15.7* 15.8* 16.3* 21.6* 19.5*   < > 19.8* 19.1*   < > 18.6* 16.2* 16.1* 17.6* 17.3*   MPV fL 11.2 10.8 11.3 12.0 12.9* 12.3* 10.3 10.4   < > 10.9 9.7   < > 10.3 10.3 9.7 9.6 9.9   PLATELETS 10*3/mm3 275 266 250 241 192 191 77* 63*   < > 67* 68*   < > 76* 112* 95* 90* 73*   IMM GRAN % %  --   --   --   --   --   --   --   --   --   --   --   --  1.2*  --  0.4 0.4 0.5   NEUTROS ABS 10*3/mm3  --  2.41 2.69 3.73 2.50 2.67 1.02*  0.90*   < > 0.77* 0.63*   < > 1.20* 2.77 1.78 1.54* 1.16*   LYMPHS ABS 10*3/mm3  --   --   --   --   --   --   --   --   --   --   --   --  0.36* 1.02 0.53* 0.59* 0.51*   MONOS ABS 10*3/mm3  --   --   --   --   --   --   --   --   --   --   --   --  0.12 0.22 0.22 0.11 0.16   EOS ABS 10*3/mm3  --  0.17 0.30 0.10 0.26 0.38 0.02 0.03  --  0.01 0.01   < > 0.02 0.03 0.03 0.03 0.03   BASOS ABS 10*3/mm3  --   --   --   --   --   --   --  0.01  --  0.03 0.01  --  0.01 0.01 0.01 0.01 0.01   IMMATURE GRANS (ABS) 10*3/mm3  --   --   --   --   --   --   --   --   --   --   --   --  0.02  --  0.01 0.01 0.01   NRBC /100 WBC 0.0  --   --   --   --   --   --  1.0*  --  1.0*  --   --  0.0  --  0.0 0.0 0.0   NEUTROPHIL % %  --  29.5* 27.0* 35.1* 28.3* 28.1* 63.0 64.3   < > 67.0 69.4   < >  --   --   --   --   --    MONOCYTES % %  --  10.5 10.0 9.3 9.1 15.6* 1.0* 3.1*   < > 2.0* 4.1*   < >  --   --   --   --   --    BASOPHIL % %  --   --   --   --   --   --   --  1.0  --  3.0* 1.0  --   --   --   --   --   --    ATYP LYMPH % %  --  27.4* 30.0* 9.3*  --  20.8* 2.0 1.0   < >  --   --    < >  --   --   --   --   --    ANISOCYTOSIS   --  Slight/1+ Slight/1+ Slight/1+ Slight/1+ Slight/1+ Mod/2+ Mod/2+   < > Mod/2+ Slight/1+   < >  --   --   --   --   --    GIANT PLT   --  Slight/1+ Slight/1+ Large/3+ Large/3+ Large/3+ Slight/1+  --   --  Mod/2+ Slight/1+   < >  --   --   --   --   --     < > = values in this interval not displayed.       Lab Results - Last 18 Months   Lab Units 11/10/23  0753 11/03/23  0742 10/27/23  0814 10/16/23  0812 10/06/23  1059 08/23/23  0455 08/11/23  0848 07/11/23  1021   GLUCOSE mg/dL 142* 158* 162* 154* 314*  --   --  171*   SODIUM mmol/L 141 140 139 141 137 138   < > 141   POTASSIUM mmol/L 4.3 4.5 4.1 4.4 4.5 3.4   < > 4.4   TOTAL CO2 mmol/L  --   --   --   --   --  23   < >  --    CO2 mmol/L 28.0 30.0* 27.0 30.0* 27.0  --   --  26.0   CHLORIDE mmol/L 103 100 101 102 101 104   < > 104   ANION GAP mmol/L 10.0  10.0 11.0 9.0 9.0 11   < > 11.0   CREATININE mg/dL 0.50* 0.44* 0.47* 0.42* 0.39* 0.51*   < > 0.50*   BUN mg/dL 15 12 14 11 8 7*   < > 8   BUN / CREAT RATIO  30.0* 27.3* 29.8* 26.2* 20.5  --   --  16.0   CALCIUM mg/dL 9.6 9.9 9.4 9.8 9.6 8.3*   < > 8.6   ALK PHOS U/L 342* 365* 359* 566* 772*  --   --  242*   TOTAL PROTEIN g/dL 6.5 6.8 6.6 6.6 6.6  --   --  5.6*   ALT (SGPT) U/L 72* 97* 70* 99* 104*  --   --  18   AST (SGOT) U/L 43* 56* 53* 85* 100*  --   --  29   BILIRUBIN mg/dL 0.3 0.4 0.4 0.4 0.4  --   --  1.5*   ALBUMIN g/dL 4.3 4.5 4.4 4.5 4.1  --   --  4.1   GLOBULIN gm/dL 2.2 2.3 2.2 2.1 2.5  --   --  1.5    < > = values in this interval not displayed.       Lab Results - Last 18 Months   Lab Units 10/06/23  1059 09/26/23  1414 08/19/23  0619 08/18/23  0406 08/17/23  0339 08/16/23  0451 08/11/23  0848 07/27/23  1550   URIC ACID mg/dL  --  5.0  --   --   --   --   --  9.5*   LDH U/L 252* 255* 1,064* 583* 624* 715*   < > 628*    < > = values in this interval not displayed.       Lab Results - Last 18 Months   Lab Units 10/27/23  0814 10/06/23  1059 09/26/23  1414 07/27/23  1550 07/11/23  1021 06/15/23  0745 05/23/23  1036 04/19/23  1307 04/11/23  1348 01/10/23  1344   IRON mcg/dL  --  88  --   --  51 50 55  --  56 71   TIBC mcg/dL  --  422  --   --  307 292* 298  --  298 358   IRON SATURATION (TSAT) %  --  21  --   --  17* 17* 18*  --  19* 20   FERRITIN ng/mL  --  860.40* 1,643* 479* 366.50* 379.20* 428.30*  --  259.50* 366.50*   TSH uIU/mL 1.380  --   --   --   --  1.560  --  1.330  --   --    FOLATE ng/mL >20.00  --   --   --   --   --   --   --   --   --        Lucía Wolff reports a pain score of 0.          ASSESSMENT:  1.  Splenic marginal zone lymphoma.  Splenomegaly 18 cm on 01/19/2022.  Negative PET on 01/25/2022.  No adenopathies on CT of the chest, abdomen and pelvis on 01/19/2022.  Treatment status: Postsplenectomy at [place] on 8/14/2023.  Adjuvant rituximab weekly x4 doses completed on  "11/10/2023.  2.  2% monoclonal B-cell population on previous bone marrow biopsy 10/31/2019, marginal zone lymphoma.  Repeat bone marrow biopsy 03/01/2022.  Less than 1% small monoclonal B-cell population with a nonspecific immune phenotype.  Normal cytogenetics.  Variably hypercellular marrow with maturing trilineage hematopoiesis, erythroid hyperplasia and mild interstitial nodule small lymphocytic infiltrate, 5 to 10% adequate iron storage.  No evidence of overt myelodysplasia or plasma cell neoplasm.  Negative JAK2 and BCR ABL.  Negative Bcl-2, BCL6, 7 q. and MYB 88.    3.  Anemia of chronic disease and component of iron deficiency.  Intolerant to oral iron, severe constipation and stomach upset.  Intravenous iron as indicated.  4.  Grade 1 mucositis on 10/17/2023.  Using Magic mouthwash as needed.  5.  Hypersensitivity reaction to rituximab on 10/16/2023.  6.  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  Not associated with increased risk to develop critical symptoms of hemochromatosis.  7. Pancytopenia, from hypersplenism, history of.  8.  Weight loss secondary to early satiety and hypersplenism, history of.  9.  Performance status of 1.               PLAN:  1.   Re: Tolerance to rituximab. \"No problem with my last treatment.\"    2.   Re: Heme status. WBC 8.1, ANC pending, hemoglobin 13.5,hematocrit 42, and platelet 275.    3.   Re: CMP. GFR pending ml/min, ALT pending, AST pending and alkaline phosphatase pending.   4.   Re: Plan for PET scan and bone marrow biopsy at [place]  1 month after completion of rituximab.  5.   Re: Follow-up with Dr. Pena 12/7/2023.  6.   Re: Completion of weekly rituximab x4 doses.  7.  Stable for observation.  8.  Discontinue weekly CBC with differential and CMP.  9.   Continue care per primary care physician and other specialist.  10.  Plan of care discussed with patient.  Understanding expressed. "  She is agreeable to proceed.  11.  Annual flu vaccine, pneumococcal vaccine every 5 years and meningococcal vaccine every 2 to 3 years. Meningococcal vaccine 10/11/2023.   12.  eRx ondansetron 8 mg p.o. every 8 hours as needed for nausea and vomiting #60, 2 refills.  13.  Return to office in 5 weeks with CBC with differential, CMP and LDH, same day.   Patient will ask Dr. Pena about duration of apixaban.              I have reviewed the assessment and plan and verified the accuracy of it. No changes to assessment and plan since the information was documented. Benigno Hastings MD 12/01/23         I spent 32 total minutes, face-to-face, caring for Lucía today. Greater than 50% of this time involved counseling and/or coordination of care as documented within this note.        (Rio Pena MD [place])  (Matthew Sandoval MD)  (Trang Frank MD)  ANAIS Montano

## 2023-12-01 ENCOUNTER — LAB (OUTPATIENT)
Dept: LAB | Facility: HOSPITAL | Age: 63
End: 2023-12-01
Payer: COMMERCIAL

## 2023-12-01 ENCOUNTER — OFFICE VISIT (OUTPATIENT)
Dept: ONCOLOGY | Facility: CLINIC | Age: 63
End: 2023-12-01
Payer: COMMERCIAL

## 2023-12-01 VITALS
HEIGHT: 63 IN | HEART RATE: 64 BPM | DIASTOLIC BLOOD PRESSURE: 64 MMHG | TEMPERATURE: 97.7 F | RESPIRATION RATE: 18 BRPM | BODY MASS INDEX: 24.34 KG/M2 | SYSTOLIC BLOOD PRESSURE: 130 MMHG | OXYGEN SATURATION: 96 % | WEIGHT: 137.4 LBS

## 2023-12-01 DIAGNOSIS — C83.07 MARGINAL ZONE LYMPHOMA OF SPLEEN: ICD-10-CM

## 2023-12-01 DIAGNOSIS — C83.07 MARGINAL ZONE LYMPHOMA OF SPLEEN: Primary | ICD-10-CM

## 2023-12-01 LAB
ALBUMIN SERPL-MCNC: 4.6 G/DL (ref 3.5–5.2)
ALBUMIN/GLOB SERPL: 2.2 G/DL
ALP SERPL-CCNC: 231 U/L (ref 39–117)
ALT SERPL W P-5'-P-CCNC: 40 U/L (ref 1–33)
ANION GAP SERPL CALCULATED.3IONS-SCNC: 9 MMOL/L (ref 5–15)
ANISOCYTOSIS BLD QL: ABNORMAL
AST SERPL-CCNC: 33 U/L (ref 1–32)
BILIRUB SERPL-MCNC: 0.4 MG/DL (ref 0–1.2)
BUN SERPL-MCNC: 12 MG/DL (ref 8–23)
BUN/CREAT SERPL: 33.3 (ref 7–25)
CALCIUM SPEC-SCNC: 9.6 MG/DL (ref 8.6–10.5)
CHLORIDE SERPL-SCNC: 101 MMOL/L (ref 98–107)
CO2 SERPL-SCNC: 29 MMOL/L (ref 22–29)
CREAT SERPL-MCNC: 0.36 MG/DL (ref 0.57–1)
DEPRECATED RDW RBC AUTO: 45 FL (ref 37–54)
EGFRCR SERPLBLD CKD-EPI 2021: 115.7 ML/MIN/1.73
EOSINOPHIL # BLD MANUAL: 0.08 10*3/MM3 (ref 0–0.4)
EOSINOPHIL NFR BLD MANUAL: 1 % (ref 0.3–6.2)
ERYTHROCYTE [DISTWIDTH] IN BLOOD BY AUTOMATED COUNT: 14.5 % (ref 12.3–15.4)
GLOBULIN UR ELPH-MCNC: 2.1 GM/DL
GLUCOSE SERPL-MCNC: 125 MG/DL (ref 65–99)
HCT VFR BLD AUTO: 42 % (ref 34–46.6)
HGB BLD-MCNC: 13.5 G/DL (ref 12–15.9)
LYMPHOCYTES # BLD MANUAL: 5.21 10*3/MM3 (ref 0.7–3.1)
LYMPHOCYTES NFR BLD MANUAL: 8.2 % (ref 5–12)
MCH RBC QN AUTO: 27.3 PG (ref 26.6–33)
MCHC RBC AUTO-ENTMCNC: 32.1 G/DL (ref 31.5–35.7)
MCV RBC AUTO: 85 FL (ref 79–97)
MONOCYTES # BLD: 0.66 10*3/MM3 (ref 0.1–0.9)
NEUTROPHILS # BLD AUTO: 2.15 10*3/MM3 (ref 1.7–7)
NEUTROPHILS NFR BLD MANUAL: 26.5 % (ref 42.7–76)
PLAT MORPH BLD: NORMAL
PLATELET # BLD AUTO: 275 10*3/MM3 (ref 140–450)
PMV BLD AUTO: 11.2 FL (ref 6–12)
POIKILOCYTOSIS BLD QL SMEAR: ABNORMAL
POLYCHROMASIA BLD QL SMEAR: ABNORMAL
POTASSIUM SERPL-SCNC: 4.1 MMOL/L (ref 3.5–5.2)
PROT SERPL-MCNC: 6.7 G/DL (ref 6–8.5)
RBC # BLD AUTO: 4.94 10*6/MM3 (ref 3.77–5.28)
SODIUM SERPL-SCNC: 139 MMOL/L (ref 136–145)
VARIANT LYMPHS NFR BLD MANUAL: 26.5 % (ref 0–5)
VARIANT LYMPHS NFR BLD MANUAL: 37.8 % (ref 19.6–45.3)
WBC MORPH BLD: NORMAL
WBC NRBC COR # BLD AUTO: 8.1 10*3/MM3 (ref 3.4–10.8)

## 2023-12-01 PROCEDURE — 80053 COMPREHEN METABOLIC PANEL: CPT

## 2023-12-01 PROCEDURE — 36415 COLL VENOUS BLD VENIPUNCTURE: CPT

## 2023-12-01 PROCEDURE — 85007 BL SMEAR W/DIFF WBC COUNT: CPT

## 2023-12-01 PROCEDURE — 85025 COMPLETE CBC W/AUTO DIFF WBC: CPT

## 2023-12-05 ENCOUNTER — NURSE TRIAGE (OUTPATIENT)
Dept: CALL CENTER | Facility: HOSPITAL | Age: 63
End: 2023-12-05
Payer: COMMERCIAL

## 2023-12-05 NOTE — TELEPHONE ENCOUNTER
"Reason for Disposition   Requesting regular office appointment    Additional Information   Negative: [1] Caller is not with the adult (patient) AND [2] reporting urgent symptoms   Negative: Lab result questions   Negative: Medication questions   Negative: Caller can't be reached by phone   Negative: Caller has already spoken to PCP or another triager   Negative: RN needs further essential information from caller in order to complete triage    Answer Assessment - Initial Assessment Questions  1. REASON FOR CALL or QUESTION: \"What is your reason for calling today?\" or \"How can I best help you?\" or \"What question do you have that I can help answer?\"     Appointment info    Protocols used: Information Only Call-ADULT-AH    "

## 2023-12-21 NOTE — PROGRESS NOTES
[facility] ON[facility] GROUP HEMATOLOGY & ONCOLOGY  [address]  formerly [facility] [address] [phone]    Patient Name: Lucía Wolff  Encounter Date: 01/05/2024  YOB: 1960  Patient Number: [id number]      REASON FOR FOLLOW-UP: Lucía Wolff is a pleasant 63 y.o. 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  Her bone marrow showed B-cell lymphoma. She is also seen for splenomegaly due to B-cell lymphoma.  She is seen 2 months rituximab weekly. She is seen alone. History obtained from the patient.  She is a reliable historian.         Oncology/Hematology History Overview Note   DIAGNOSTIC ABNORMALITIES:  CBC 01/17/2022. Remarkable for platelet 97. Bilirubin 1.9.   CT neck 01/19/2022.  No cervical lymphadenopathy by size criteria.  Asymmetric appearance of the left submandibular gland. Correlation for sialoadenitis recommended. If indicated, dedicated ultrasound may be Performed.  CT chest 01/19/2022. No intrathoracic lymphadenopathy. No axillary lymphadenopathy  CT abdomen and pelvis 01/19/2022. Massive splenomegaly.  Splenic artery aneurysm, 1.5 cm.  No additional abdominal or pelvic lymphadenopathy.  PET 01/25/2022. No hypermetabolic lymphadenopathy or tumor. No scintigraphic evidence of malignancy. . Persistent prominent splenomegaly.  Recommendations from [place] on 05/12/2022.  The palpable splenomegaly in the absence of additional findings to suspect avid adenopathy and liver disease suggested the patient has hypersplenism.  Other differential include nutritional deficiencies or cyclical neutropenia.  The enlarging spleen is not significantly PET avid therefore splenectomy would not be recommended at this time. Other reason is infection with postsplenectomy state. Would recommend splenectomy if the patient continues to develop new symptoms for example losing another 10 pounds in 6 months, recurrent  major infections, transfusion dependent cytopenias or splenic infarcts.   JAK2 was negative, normal copper at 123.2, B12 at 748, reticulocyte 4%, negative hepatitis C antibody, uric acid 8, WBC 3.8, hemoglobin 12.4, MCV 89, platelet 116, bilirubin 2.5, , and ferritin 262 at [place].   Seen by ANAIS Hamm on 7/27/2023.  Plan for splenectomy at [place].  Pathology report 8/14/2023.  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  NYD 88 negative.  PET scan from [place] on 9/22/2023.  No evidence for FDG avid lymphoma.  Increased size of a moderate right pleural effusion when compared to 8/18/2023, with resolution of the left pleural effusion.    Note from  on 9/26/2023.  Patient felt well and healed from surgery.  PET scan 9/22/2023 failed to show significant adenopathy.  WBC 8, ANC 2.96, hemoglobin 14.1, hematocrit 46, MCV 92 and platelet 194.  CMP remarkable for alkaline phosphatase 733, AST 76 and ALT 89.  Ferritin 1643.  .  Uric acid 5.  Discussed with patient that she is no evidence of disease but not cured of the marginal zone lymphoma because anticipate marrow will remain positive and margins of lymphoma can have late relapse.  We can observe until symptoms arise and then give zanabrutinib or we can give 4 doses of rituximab to deepen the response.  Patient wants to get rituximab locally.  Follow-up in 3 months.  PET scan 1 month after fourth dose of rituximab         PREVIOUS INTERVENTIONS:  Ferrous sulfate 4/11/2023 to 4/24/2023, intolerant, severe constipation and GI upset.  Venofer 200 mg 5/8/2023 and 5/15/2023.  Rituximab 10/16/2023 with hypersensitivity reaction, 5 minutes into infusion.  Rituximab 10/20/2023, 10/27/2023, 11/3/2023 and 11/10/2023.     Marginal zone lymphoma of spleen   3/28/2022 Initial Diagnosis    Marginal zone lymphoma of spleen (HCC)     10/20/2023 - 11/10/2023 Chemotherapy    OP LYMPHOMA (CLL) RiTUXimab  "(Weekly X 4)         PAST MEDICAL HISTORY:  ALLERGIES:  No Known Allergies  CURRENT MEDICATIONS:  Outpatient Encounter Medications as of 1/5/2024   Medication Sig Dispense Refill    acetaminophen (TYLENOL) 325 MG tablet Take 2 tablets by mouth Every 6 (Six) Hours As Needed for Mild Pain.      calcium carbonate (OS-MITRA) 600 MG tablet Take 1 tablet by mouth 2 (Two) Times a Day With Meals.      Cholecalciferol (vitamin D3) 125 MCG (5000 UT) capsule capsule Take 1 capsule by mouth Daily.      Eliquis 5 MG tablet tablet Take  by mouth 2 (Two) Times a Day.      folic acid (FOLVITE) 1 MG tablet Take 1 tablet by mouth Daily.      metFORMIN (GLUCOPHAGE) 500 MG tablet Take 1 tablet by mouth 2 (Two) Times a Day With Meals.      Multiple Vitamins-Minerals (MULTIVITAMIN WITH MINERALS) tablet tablet Take 1 tablet by mouth Daily.      Omega-3 300 MG capsule Take 4 capsules by mouth Daily.      omeprazole (priLOSEC) 20 MG capsule       pantoprazole (PROTONIX) 40 MG EC tablet Take 1 tablet every day by oral route.      Semaglutide (OZEMPIC, 0.25 OR 0.5 MG/DOSE, SC) Inject  under the skin into the appropriate area as directed.      Syringe 25G X 1\" 3 ML misc USE 1 SYRINGE PER B12 INJECTION 12 each 0    Thyroid 65 MG PO tablet Take 1 tablet by mouth Daily.      VIT B12-METHIONINE-INOS-CHOL IM Inject 1 mL into the appropriate muscle as directed by prescriber Every 30 (Thirty) Days. Has not taken since 2020 at least        No facility-administered encounter medications on file as of 1/5/2024.     ADULT ILLNESSES:  Patient Active Problem List   Diagnosis Code    Heart murmur R01.1    Thrombocytopenia D69.6    Marginal zone lymphoma of spleen C83.07    Splenomegaly R16.1    Pancytopenia, acquired D61.818    Iron deficiency anemia D50.9    Intestinal malabsorption, unspecified K90.9    Transfusion reaction T80.92XA     SURGERIES:  Past Surgical History:   Procedure Laterality Date    CHOLECYSTECTOMY      HYSTERECTOMY  2003    PARTIAL    " "LIVER BIOPSY  08/12/2019     HEALTH MAINTENANCE ITEMS:  Health Maintenance Due   Topic Date Due    URINE MICROALBUMIN  Never done    COLORECTAL CANCER SCREENING  Never done    Pneumococcal Vaccine 0-64 (1 of 2 - PCV) Never done    TDAP/TD VACCINES (1 - Tdap) Never done    ZOSTER VACCINE (1 of 2) Never done    ANNUAL PHYSICAL  Never done    DIABETIC FOOT EXAM  Never done    PAP SMEAR  Never done    DIABETIC EYE EXAM  Never done    MAMMOGRAM  11/01/2020    Hepatitis B (1 of 3 - Risk 3-dose series) Never done    INFLUENZA VACCINE  08/01/2023    COVID-19 Vaccine (4 - 2023-24 season) 09/01/2023    HEMOGLOBIN A1C  12/15/2023       <no information>  Last Completed Colonoscopy       This patient has no relevant Health Maintenance data.          There is no immunization history for the selected administration types on file for this patient.  Last Completed Mammogram       This patient has no relevant Health Maintenance data.              FAMILY HISTORY:  Family History   Problem Relation Age of Onset    Alzheimer's disease Mother     Diabetes Father     Stroke Father     Cardiomyopathy Father     Heart attack Father     Heart disease Father     Hypertension Father     Cancer Brother         She is unclear of the kind of cancer    Diabetes Brother     Diabetes Brother     Hypertension Brother     No Known Problems Brother     No Known Problems Son     No Known Problems Son     No Known Problems Daughter      SOCIAL HISTORY:  Social History     Socioeconomic History    Marital status:    Tobacco Use    Smoking status: Never    Smokeless tobacco: Never    Tobacco comments:     has tried it but did not keep smoking   Vaping Use    Vaping Use: Never used   Substance and Sexual Activity    Alcohol use: Never    Drug use: No    Sexual activity: Defer       REVIEW OF SYSTEMS:    Review of Systems   Constitutional:  Negative for fatigue, fever and unexpected weight change.        \"Feel good.\"   HENT:  Negative for congestion " "and mouth sores.    Eyes:  Negative for discharge and redness.   Respiratory:  Negative for shortness of breath and wheezing.    Cardiovascular:  Negative for chest pain and palpitations.   Gastrointestinal:  Negative for abdominal pain, nausea and vomiting.   Endocrine: Negative for polydipsia and polyphagia.   Genitourinary:  Negative for dysuria and flank pain.   Musculoskeletal:  Negative for gait problem and joint swelling.   Skin:  Negative for pallor.   Neurological:  Negative for dizziness, speech difficulty and weakness.   Hematological:  Negative for adenopathy. Does not bruise/bleed easily.   Psychiatric/Behavioral:  Negative for agitation, confusion and hallucinations.        VITAL SIGNS: /60   Pulse 68   Temp 98.7 °F (37.1 °C)   Resp 18   Ht 160 cm (63\")   Wt 63.4 kg (139 lb 12.8 oz)   SpO2 99%   Breastfeeding No   BMI 24.76 kg/m²  Gained 2 pounds.   Pain Score    01/05/24 1058   PainSc: 0-No pain       PHYSICAL EXAMINATION:     Physical Exam  Vitals reviewed.   Constitutional:       General: She is not in acute distress.  HENT:      Head: Normocephalic and atraumatic.   Eyes:      General: No scleral icterus.  Cardiovascular:      Rate and Rhythm: Normal rate.   Pulmonary:      Effort: No respiratory distress.      Breath sounds: No wheezing.   Abdominal:      General: Bowel sounds are normal.      Palpations: Abdomen is soft.   Musculoskeletal:         General: No swelling.      Cervical back: No rigidity.   Skin:     Coloration: Skin is not pale.   Neurological:      Mental Status: She is alert and oriented to person, place, and time.   Psychiatric:         Mood and Affect: Mood normal.         Behavior: Behavior normal.         Thought Content: Thought content normal.         Judgment: Judgment normal.         LABS    Lab Results - Last 18 Months   Lab Units 12/01/23  1015 11/10/23  0753 11/03/23  0742 10/27/23  0814 10/16/23  0812 10/06/23  1059 08/10/23  0953 08/03/23  1020 " 07/13/23  1309 07/11/23  1021 06/15/23  0745 04/11/23  1348 02/28/23  0852 01/10/23  1344 11/22/22  0937 10/11/22  1258 08/03/22  1036   HEMOGLOBIN g/dL 13.5 13.8 13.7 13.8 14.4 13.9 6.1* 5.5*   < > 6.6* 7.1*   < > 9.2* 12.7 10.2* 10.1* 8.6*   HEMATOCRIT % 42.0 43.9 44.3 45.0 47.2* 45.6 20.4* 18.2*   < > 22.8* 23.9*   < > 29.2* 38.0 31.4* 31.7* 27.4*   MCV fL 85.0 88.0 89.5 90.0 90.1 91.9 95.3 94.3   < > 96.6 94.1   < > 91.3 86.6 89.2 90.1 90.4   WBC 10*3/mm3 8.10 7.91 9.97 10.35 8.82 9.14 1.62* 1.29*   < > 1.11* 0.89*   < > 1.73* 4.07 2.58* 2.29* 1.88*   RDW % 14.5 15.8* 15.8* 15.7* 15.8* 16.3* 21.6* 19.5*   < > 19.8* 19.1*   < > 18.6* 16.2* 16.1* 17.6* 17.3*   MPV fL 11.2 10.8 11.3 12.0 12.9* 12.3* 10.3 10.4   < > 10.9 9.7   < > 10.3 10.3 9.7 9.6 9.9   PLATELETS 10*3/mm3 275 266 250 241 192 191 77* 63*   < > 67* 68*   < > 76* 112* 95* 90* 73*   IMM GRAN % %  --   --   --   --   --   --   --   --   --   --   --   --  1.2*  --  0.4 0.4 0.5   NEUTROS ABS 10*3/mm3 2.15 2.41 2.69 3.73 2.50 2.67 1.02* 0.90*   < > 0.77* 0.63*   < > 1.20* 2.77 1.78 1.54* 1.16*   LYMPHS ABS 10*3/mm3  --   --   --   --   --   --   --   --   --   --   --   --  0.36* 1.02 0.53* 0.59* 0.51*   MONOS ABS 10*3/mm3  --   --   --   --   --   --   --   --   --   --   --   --  0.12 0.22 0.22 0.11 0.16   EOS ABS 10*3/mm3 0.08 0.17 0.30 0.10 0.26 0.38 0.02 0.03  --  0.01 0.01   < > 0.02 0.03 0.03 0.03 0.03   BASOS ABS 10*3/mm3  --   --   --   --   --   --   --  0.01  --  0.03 0.01  --  0.01 0.01 0.01 0.01 0.01   IMMATURE GRANS (ABS) 10*3/mm3  --   --   --   --   --   --   --   --   --   --   --   --  0.02  --  0.01 0.01 0.01   NRBC /100 WBC  --   --   --   --   --   --   --  1.0*  --  1.0*  --   --  0.0  --  0.0 0.0 0.0   NEUTROPHIL % % 26.5* 29.5* 27.0* 35.1* 28.3* 28.1* 63.0 64.3   < > 67.0 69.4   < >  --   --   --   --   --    MONOCYTES % % 8.2 10.5 10.0 9.3 9.1 15.6* 1.0* 3.1*   < > 2.0* 4.1*   < >  --   --   --   --   --    BASOPHIL % %  --   --    --   --   --   --   --  1.0  --  3.0* 1.0  --   --   --   --   --   --    ATYP LYMPH % % 26.5* 27.4* 30.0* 9.3*  --  20.8* 2.0 1.0   < >  --   --    < >  --   --   --   --   --    ANISOCYTOSIS  Slight/1+ Slight/1+ Slight/1+ Slight/1+ Slight/1+ Slight/1+ Mod/2+ Mod/2+   < > Mod/2+ Slight/1+   < >  --   --   --   --   --    GIANT PLT   --  Slight/1+ Slight/1+ Large/3+ Large/3+ Large/3+ Slight/1+  --   --  Mod/2+ Slight/1+   < >  --   --   --   --   --     < > = values in this interval not displayed.       Lab Results - Last 18 Months   Lab Units 12/01/23  1015 11/10/23  0753 11/03/23  0742 10/27/23  0814 10/16/23  0812 10/06/23  1059   GLUCOSE mg/dL 125* 142* 158* 162* 154* 314*   SODIUM mmol/L 139 141 140 139 141 137   POTASSIUM mmol/L 4.1 4.3 4.5 4.1 4.4 4.5   CO2 mmol/L 29.0 28.0 30.0* 27.0 30.0* 27.0   CHLORIDE mmol/L 101 103 100 101 102 101   ANION GAP mmol/L 9.0 10.0 10.0 11.0 9.0 9.0   CREATININE mg/dL 0.36* 0.50* 0.44* 0.47* 0.42* 0.39*   BUN mg/dL 12 15 12 14 11 8   BUN / CREAT RATIO  33.3* 30.0* 27.3* 29.8* 26.2* 20.5   CALCIUM mg/dL 9.6 9.6 9.9 9.4 9.8 9.6   ALK PHOS U/L 231* 342* 365* 359* 566* 772*   TOTAL PROTEIN g/dL 6.7 6.5 6.8 6.6 6.6 6.6   ALT (SGPT) U/L 40* 72* 97* 70* 99* 104*   AST (SGOT) U/L 33* 43* 56* 53* 85* 100*   BILIRUBIN mg/dL 0.4 0.3 0.4 0.4 0.4 0.4   ALBUMIN g/dL 4.6 4.3 4.5 4.4 4.5 4.1   GLOBULIN gm/dL 2.1 2.2 2.3 2.2 2.1 2.5       Lab Results - Last 18 Months   Lab Units 12/07/23  0654 10/06/23  1059 09/26/23  1414 08/19/23  0619 08/18/23  0406 08/17/23  0339 08/11/23  0848 07/27/23  1550   URIC ACID mg/dL  --   --  5.0  --   --   --   --  9.5*   LDH unit/L 233* 252* 255* 1,064* 583* 624*   < > 628*    < > = values in this interval not displayed.       Lab Results - Last 18 Months   Lab Units 10/27/23  0814 10/06/23  1059 09/26/23  1414 07/27/23  1550 07/11/23  1021 06/15/23  0745 05/23/23  1036 04/19/23  1307 04/11/23  1348 01/10/23  1344   IRON mcg/dL  --  88  --   --  51 50 55  --   56 71   TIBC mcg/dL  --  422  --   --  307 292* 298  --  298 358   IRON SATURATION (TSAT) %  --  21  --   --  17* 17* 18*  --  19* 20   FERRITIN ng/mL  --  860.40* 1,643* 479* 366.50* 379.20* 428.30*  --  259.50* 366.50*   TSH uIU/mL 1.380  --   --   --   --  1.560  --  1.330  --   --    FOLATE ng/mL >20.00  --   --   --   --   --   --   --   --   --        Lucía Gilbertmiguel reports a pain score of 0.            ASSESSMENT:  1.  Splenic marginal zone lymphoma.  Splenomegaly 18 cm on 01/19/2022.  Negative PET on 01/25/2022.  No adenopathies on CT of the chest, abdomen and pelvis on 01/19/2022.  Treatment status: Postsplenectomy at [place] on 8/14/2023.  Adjuvant rituximab weekly for 4 doses completed on 11/10/2023.  2.  2% monoclonal B-cell population on previous bone marrow biopsy 10/31/2019, marginal zone lymphoma.  Repeat bone marrow biopsy 03/01/2022.  Less than 1% small monoclonal B-cell population with a nonspecific immune phenotype.  Normal cytogenetics.  Variably hypercellular marrow with maturing trilineage hematopoiesis, erythroid hyperplasia and mild interstitial nodule small lymphocytic infiltrate, 5 to 10% adequate iron storage.  No evidence of overt myelodysplasia or plasma cell neoplasm.  Negative JAK2 and BCR ABL.  Negative Bcl-2, BCL6, 7 q. and MYB 88.    3.  Anemia of chronic disease and component of iron deficiency.  Intolerant to oral iron, severe constipation and stomach upset.  Intravenous iron if needed.  4.  Grade 1 mucositis on 10/17/2023, history of.    5.  Hypersensitivity reaction to rituximab on 10/16/2023.  6.  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  Not associated with increased risk to develop critical symptoms of hemochromatosis.  7. Pancytopenia, from hypersplenism, history of.  8.  Weight loss secondary to early satiety and hypersplenism, history of.  9.  Performance status of 0               PLAN:  1.   Re: Note from Hilary Hansen  APRN on 12/7/2023.  Follow-up in 3 months and repeat PET in 6 months, 6/2024 at [place].  Plan for colonoscopy. PET showed uptake in colorectal area.  2.   Re: Heme status. WBC 6.3, hemoglobin 12.8,hematocrit 39.5, and platelet 336.    3.   Re: CMP. GFR pending ml/min, ALT pending, AST pending and alkaline phosphatase pending.   4.   Re: PET report on 12/7/2023. No evidence of FDG avid lymphoma.   Splenectomy.   New brown fat uptake of FDG in the neck and upper chest.  New diffuse intense uptake in the colon and rectum. Considerations include medication effect or inflammation / infection.    5.   Re: Bone marrow biopsy report 12/11/2023. This is a normocellular marrow with trilineage hematopoiesis and increased storage iron. There is no significant dysplasia or increase in blasts. There are lymphoid aggregates in the core biopsy primarily composed of CD3-positive T cells and rare to no B-cells. Morphologically, these cells are small and mature. Flow cytometry detected a skewed CD8-positive T-cell population. The significance of these findings is somewhat unclear, see flow cytometry section for further description.   6.   Re: Continue anticoagulation until 2/2024, 6 months of treatment.  7.   Re: Follow-up with Dr. Pena 3/2024.  8.   Re:  Stable for observation.   9.   Continue care per primary care physician and other specialist.  10.  Plan of care discussed with patient.  Understanding expressed.  She is agreeable to proceed.  11.  Annual flu vaccine, pneumococcal vaccine every 5 years and meningococcal vaccine every 2 to 3 years. Meningococcal vaccine 10/11/2023.   12.  eRx ondansetron 8 mg p.o. every 8 hours as needed for nausea and vomiting #60, 2 refills.  13.  Return to office in 6 months with CBC with differential, CMP and LDH, same day.               I have reviewed the assessment and plan and verified the accuracy of it. No changes to assessment and  plan since the information was documented. Benigno Hastings MD 01/05/24         I spent 31 total minutes, face-to-face, caring for Lucía today. Greater than 50% of this time involved counseling and/or coordination of care as documented within this note.          (Rio Pena MD [place])  (Matthew Sandoval MD)  (Trang Frank MD)  ANAIS Montano

## 2024-01-05 ENCOUNTER — LAB (OUTPATIENT)
Dept: LAB | Facility: HOSPITAL | Age: 64
End: 2024-01-05
Payer: COMMERCIAL

## 2024-01-05 ENCOUNTER — OFFICE VISIT (OUTPATIENT)
Dept: ONCOLOGY | Facility: CLINIC | Age: 64
End: 2024-01-05
Payer: COMMERCIAL

## 2024-01-05 VITALS
DIASTOLIC BLOOD PRESSURE: 60 MMHG | SYSTOLIC BLOOD PRESSURE: 112 MMHG | HEIGHT: 63 IN | WEIGHT: 139.8 LBS | HEART RATE: 68 BPM | RESPIRATION RATE: 18 BRPM | TEMPERATURE: 98.7 F | OXYGEN SATURATION: 99 % | BODY MASS INDEX: 24.77 KG/M2

## 2024-01-05 DIAGNOSIS — C83.07 MARGINAL ZONE LYMPHOMA OF SPLEEN: Primary | ICD-10-CM

## 2024-01-05 LAB
ALBUMIN SERPL-MCNC: 4.5 G/DL (ref 3.5–5.2)
ALBUMIN/GLOB SERPL: 2.1 G/DL
ALP SERPL-CCNC: 238 U/L (ref 39–117)
ALT SERPL W P-5'-P-CCNC: 56 U/L (ref 1–33)
ANION GAP SERPL CALCULATED.3IONS-SCNC: 11 MMOL/L (ref 5–15)
AST SERPL-CCNC: 40 U/L (ref 1–32)
BILIRUB SERPL-MCNC: 0.5 MG/DL (ref 0–1.2)
BUN SERPL-MCNC: 12 MG/DL (ref 8–23)
BUN/CREAT SERPL: 22.2 (ref 7–25)
CALCIUM SPEC-SCNC: 9.7 MG/DL (ref 8.6–10.5)
CHLORIDE SERPL-SCNC: 103 MMOL/L (ref 98–107)
CO2 SERPL-SCNC: 25 MMOL/L (ref 22–29)
CREAT SERPL-MCNC: 0.54 MG/DL (ref 0.57–1)
DEPRECATED RDW RBC AUTO: 46.8 FL (ref 37–54)
EGFRCR SERPLBLD CKD-EPI 2021: 104.9 ML/MIN/1.73
EOSINOPHIL # BLD MANUAL: 0.19 10*3/MM3 (ref 0–0.4)
EOSINOPHIL NFR BLD MANUAL: 3 % (ref 0.3–6.2)
ERYTHROCYTE [DISTWIDTH] IN BLOOD BY AUTOMATED COUNT: 14.8 % (ref 12.3–15.4)
GIANT PLATELETS: ABNORMAL
GLOBULIN UR ELPH-MCNC: 2.1 GM/DL
GLUCOSE SERPL-MCNC: 119 MG/DL (ref 65–99)
HCT VFR BLD AUTO: 39.5 % (ref 34–46.6)
HGB BLD-MCNC: 12.8 G/DL (ref 12–15.9)
HOLD SPECIMEN: NORMAL
LDH SERPL-CCNC: 151 U/L (ref 135–214)
LYMPHOCYTES # BLD MANUAL: 4.45 10*3/MM3 (ref 0.7–3.1)
LYMPHOCYTES NFR BLD MANUAL: 11 % (ref 5–12)
MCH RBC QN AUTO: 27.7 PG (ref 26.6–33)
MCHC RBC AUTO-ENTMCNC: 32.4 G/DL (ref 31.5–35.7)
MCV RBC AUTO: 85.5 FL (ref 79–97)
MONOCYTES # BLD: 0.7 10*3/MM3 (ref 0.1–0.9)
NEUTROPHILS # BLD AUTO: 1.02 10*3/MM3 (ref 1.7–7)
NEUTROPHILS NFR BLD MANUAL: 16 % (ref 42.7–76)
PLATELET # BLD AUTO: 336 10*3/MM3 (ref 140–450)
PMV BLD AUTO: 10.4 FL (ref 6–12)
POIKILOCYTOSIS BLD QL SMEAR: ABNORMAL
POTASSIUM SERPL-SCNC: 4.4 MMOL/L (ref 3.5–5.2)
PROT SERPL-MCNC: 6.6 G/DL (ref 6–8.5)
RBC # BLD AUTO: 4.62 10*6/MM3 (ref 3.77–5.28)
SODIUM SERPL-SCNC: 139 MMOL/L (ref 136–145)
VARIANT LYMPHS NFR BLD MANUAL: 70 % (ref 19.6–45.3)
WBC MORPH BLD: NORMAL
WBC NRBC COR # BLD AUTO: 6.36 10*3/MM3 (ref 3.4–10.8)

## 2024-01-05 PROCEDURE — 36415 COLL VENOUS BLD VENIPUNCTURE: CPT

## 2024-01-05 PROCEDURE — 85007 BL SMEAR W/DIFF WBC COUNT: CPT

## 2024-01-05 PROCEDURE — 80053 COMPREHEN METABOLIC PANEL: CPT

## 2024-01-05 PROCEDURE — 85025 COMPLETE CBC W/AUTO DIFF WBC: CPT

## 2024-01-05 PROCEDURE — 83615 LACTATE (LD) (LDH) ENZYME: CPT

## 2024-05-06 ENCOUNTER — LAB (OUTPATIENT)
Dept: LAB | Facility: HOSPITAL | Age: 64
End: 2024-05-06
Payer: COMMERCIAL

## 2024-05-06 ENCOUNTER — TRANSCRIBE ORDERS (OUTPATIENT)
Dept: ADMINISTRATIVE | Facility: HOSPITAL | Age: 64
End: 2024-05-06
Payer: COMMERCIAL

## 2024-05-06 DIAGNOSIS — E53.8 DEFICIENCY OF VITAMIN B12: ICD-10-CM

## 2024-05-06 DIAGNOSIS — E11.9 TYPE 2 DIABETES MELLITUS WITHOUT COMPLICATION, UNSPECIFIED WHETHER LONG TERM INSULIN USE: ICD-10-CM

## 2024-05-06 DIAGNOSIS — E78.5 HYPERLIPIDEMIA, UNSPECIFIED HYPERLIPIDEMIA TYPE: ICD-10-CM

## 2024-05-06 DIAGNOSIS — D50.9 IRON DEFICIENCY ANEMIA, UNSPECIFIED IRON DEFICIENCY ANEMIA TYPE: ICD-10-CM

## 2024-05-06 DIAGNOSIS — E55.9 VITAMIN D DEFICIENCY: ICD-10-CM

## 2024-05-06 DIAGNOSIS — I10 HYPERTENSION, ESSENTIAL: Primary | ICD-10-CM

## 2024-05-06 DIAGNOSIS — E03.9 HYPOTHYROIDISM, UNSPECIFIED TYPE: ICD-10-CM

## 2024-05-06 DIAGNOSIS — I10 HYPERTENSION, ESSENTIAL: ICD-10-CM

## 2024-05-06 LAB
25(OH)D3 SERPL-MCNC: 91.1 NG/ML (ref 30–100)
ACANTHOCYTES BLD QL SMEAR: ABNORMAL
ALBUMIN SERPL-MCNC: 4.6 G/DL (ref 3.5–5.2)
ALBUMIN UR-MCNC: <1.2 MG/DL
ALBUMIN/GLOB SERPL: 2.3 G/DL
ALP SERPL-CCNC: 147 U/L (ref 39–117)
ALT SERPL W P-5'-P-CCNC: 43 U/L (ref 1–33)
ANION GAP SERPL CALCULATED.3IONS-SCNC: 10 MMOL/L (ref 5–15)
AST SERPL-CCNC: 30 U/L (ref 1–32)
BILIRUB SERPL-MCNC: 0.4 MG/DL (ref 0–1.2)
BUN SERPL-MCNC: 17 MG/DL (ref 8–23)
BUN/CREAT SERPL: 37.8 (ref 7–25)
BURR CELLS BLD QL SMEAR: ABNORMAL
CALCIUM SPEC-SCNC: 9.6 MG/DL (ref 8.6–10.5)
CHLORIDE SERPL-SCNC: 104 MMOL/L (ref 98–107)
CHOLEST SERPL-MCNC: 237 MG/DL (ref 0–200)
CO2 SERPL-SCNC: 27 MMOL/L (ref 22–29)
CREAT SERPL-MCNC: 0.45 MG/DL (ref 0.57–1)
CREAT UR-MCNC: 81.9 MG/DL
DEPRECATED RDW RBC AUTO: 44.6 FL (ref 37–54)
EGFRCR SERPLBLD CKD-EPI 2021: 109.6 ML/MIN/1.73
ELLIPTOCYTES BLD QL SMEAR: ABNORMAL
EOSINOPHIL # BLD MANUAL: 0.25 10*3/MM3 (ref 0–0.4)
EOSINOPHIL NFR BLD MANUAL: 4 % (ref 0.3–6.2)
ERYTHROCYTE [DISTWIDTH] IN BLOOD BY AUTOMATED COUNT: 13.9 % (ref 12.3–15.4)
FERRITIN SERPL-MCNC: 324.9 NG/ML (ref 13–150)
GIANT PLATELETS: ABNORMAL
GLOBULIN UR ELPH-MCNC: 2 GM/DL
GLUCOSE SERPL-MCNC: 128 MG/DL (ref 65–99)
HBA1C MFR BLD: 6.3 % (ref 4.8–5.6)
HCT VFR BLD AUTO: 41.9 % (ref 34–46.6)
HDLC SERPL-MCNC: 62 MG/DL (ref 40–60)
HGB BLD-MCNC: 14 G/DL (ref 12–15.9)
IRON 24H UR-MRATE: 72 MCG/DL (ref 37–145)
LDLC SERPL CALC-MCNC: 147 MG/DL (ref 0–100)
LDLC/HDLC SERPL: 2.32 {RATIO}
LYMPHOCYTES # BLD MANUAL: 2.43 10*3/MM3 (ref 0.7–3.1)
LYMPHOCYTES NFR BLD MANUAL: 15.2 % (ref 5–12)
MCH RBC QN AUTO: 29.1 PG (ref 26.6–33)
MCHC RBC AUTO-ENTMCNC: 33.4 G/DL (ref 31.5–35.7)
MCV RBC AUTO: 87.1 FL (ref 79–97)
MICROALBUMIN/CREAT UR: NORMAL MG/G{CREAT}
MONOCYTES # BLD: 0.94 10*3/MM3 (ref 0.1–0.9)
NEUTROPHILS # BLD AUTO: 2.55 10*3/MM3 (ref 1.7–7)
NEUTROPHILS NFR BLD MANUAL: 41.4 % (ref 42.7–76)
OVALOCYTES BLD QL SMEAR: ABNORMAL
PLATELET # BLD AUTO: 300 10*3/MM3 (ref 140–450)
PMV BLD AUTO: 10.8 FL (ref 6–12)
POIKILOCYTOSIS BLD QL SMEAR: ABNORMAL
POTASSIUM SERPL-SCNC: 4.2 MMOL/L (ref 3.5–5.2)
PROT SERPL-MCNC: 6.6 G/DL (ref 6–8.5)
RBC # BLD AUTO: 4.81 10*6/MM3 (ref 3.77–5.28)
SODIUM SERPL-SCNC: 141 MMOL/L (ref 136–145)
T4 FREE SERPL-MCNC: 1.06 NG/DL (ref 0.93–1.7)
TARGETS BLD QL SMEAR: ABNORMAL
TRIGL SERPL-MCNC: 157 MG/DL (ref 0–150)
TSH SERPL DL<=0.05 MIU/L-ACNC: 2.89 UIU/ML (ref 0.27–4.2)
VARIANT LYMPHS NFR BLD MANUAL: 33.3 % (ref 19.6–45.3)
VARIANT LYMPHS NFR BLD MANUAL: 6.1 % (ref 0–5)
VIT B12 BLD-MCNC: 891 PG/ML (ref 211–946)
VLDLC SERPL-MCNC: 28 MG/DL (ref 5–40)
WBC MORPH BLD: NORMAL
WBC NRBC COR # BLD AUTO: 6.16 10*3/MM3 (ref 3.4–10.8)

## 2024-05-06 PROCEDURE — 82728 ASSAY OF FERRITIN: CPT

## 2024-05-06 PROCEDURE — 84439 ASSAY OF FREE THYROXINE: CPT

## 2024-05-06 PROCEDURE — 82570 ASSAY OF URINE CREATININE: CPT

## 2024-05-06 PROCEDURE — 80061 LIPID PANEL: CPT

## 2024-05-06 PROCEDURE — 82306 VITAMIN D 25 HYDROXY: CPT

## 2024-05-06 PROCEDURE — 83540 ASSAY OF IRON: CPT

## 2024-05-06 PROCEDURE — 80050 GENERAL HEALTH PANEL: CPT

## 2024-05-06 PROCEDURE — 82607 VITAMIN B-12: CPT

## 2024-05-06 PROCEDURE — 85007 BL SMEAR W/DIFF WBC COUNT: CPT

## 2024-05-06 PROCEDURE — 83036 HEMOGLOBIN GLYCOSYLATED A1C: CPT

## 2024-05-06 PROCEDURE — 36415 COLL VENOUS BLD VENIPUNCTURE: CPT

## 2024-05-06 PROCEDURE — 84482 T3 REVERSE: CPT

## 2024-05-06 PROCEDURE — 82043 UR ALBUMIN QUANTITATIVE: CPT

## 2024-05-09 LAB — T3REVERSE SERPL-MCNC: 11.4 NG/DL (ref 9.2–24.1)

## 2024-06-27 NOTE — PROGRESS NOTES
[facility] HEMATOLOGY & ONCOLOGY  [address]  [facility] [address] [phone]    Patient Name: Lucía Wolff  Encounter Date: 07/12/2024  YOB: 1960  Patient Number: [id number]      REASON FOR FOLLOW-UP: Lucía Wolff is a pleasant 63 y.o. 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  Her bone marrow showed B-cell lymphoma. She is also seen for splenomegaly due to B-cell lymphoma.  The patient is seen 8 months post weekly rituximab x 4 doses. She is seen alone. History obtained from the patient.  History is considered reliable.         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 Remarkable for platelet 97. Bilirubin 1.9.   CT neck 01/19/2022.  No cervical lymphadenopathy by size criteria.  Asymmetric appearance of the left submandibular gland. Correlation for sialoadenitis recommended. If indicated, dedicated ultrasound may be Performed.  CT chest 01/19/2022. No intrathoracic lymphadenopathy. No axillary lymphadenopathy  CT abdomen and pelvis 01/19/2022. Massive splenomegaly.  Splenic artery aneurysm, 1.5 cm.  No additional abdominal or pelvic lymphadenopathy.  PET 01/25/2022. No hypermetabolic lymphadenopathy or tumor. No scintigraphic evidence of malignancy. . Persistent prominent splenomegaly.  Recommendations from [facility] on 05/12/2022.  The palpable splenomegaly in the absence of additional findings to suspect avid adenopathy and liver disease suggested the patient has hypersplenism.  Other differential include nutritional deficiencies or cyclical neutropenia.  The enlarging spleen is not significantly PET avid therefore splenectomy would  not be recommended at this time. Other reason is infection with postsplenectomy state. Would recommend splenectomy if the patient continues to develop new symptoms for example losing another 10 pounds in 6 months, recurrent major infections, transfusion dependent cytopenias or splenic infarcts.   JAK2 was negative, normal copper at 123.2, B12 at 748, reticulocyte 4%, negative hepatitis C antibody, uric acid 8, WBC 3.8, hemoglobin 12.4, MCV 89, platelet 116, bilirubin 2.5, , and ferritin 262 at [facility].   Seen by ANAIS Hamm on 7/27/2023.  Plan for splenectomy at [facility].  Pathology report 8/14/2023.  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  NYD 88 negative.  PET scan from [facility] on 9/22/2023.  No evidence for FDG avid lymphoma.  Increased size of a moderate right pleural effusion when compared to 8/18/2023, with resolution of the left pleural effusion.    Note from  on 9/26/2023.  Patient felt well and healed from surgery.  PET scan 9/22/2023 failed to show significant adenopathy.  WBC 8, ANC 2.96, hemoglobin 14.1, hematocrit 46, MCV 92 and platelet 194.  CMP remarkable for alkaline phosphatase 733, AST 76 and ALT 89.  Ferritin 1643.  .  Uric acid 5.  Discussed with patient that she is no evidence of disease but not cured of the marginal zone lymphoma because anticipate marrow will remain positive and margins of lymphoma can have late relapse.  We can observe until symptoms arise and then give zanabrutinib or we can give 4 doses of rituximab to deepen the response.  Patient wants to get rituximab locally.  Follow-up in 3 months.  PET scan 1 month after fourth dose of rituximab         PREVIOUS INTERVENTIONS:  Ferrous sulfate 4/11/2023 to 4/24/2023, intolerant, severe constipation and GI upset.  Venofer 200 mg 5/8/2023 and 5/15/2023.  Rituximab 10/16/2023 with hypersensitivity reaction, 5 minutes into infusion.  Rituximab  "10/20/2023, 10/27/2023, 11/3/2023 and 11/10/2023.     Marginal zone lymphoma of spleen   3/28/2022 Initial Diagnosis    Marginal zone lymphoma of spleen (HCC)     10/20/2023 - 11/10/2023 Chemotherapy    OP LYMPHOMA (CLL) RiTUXimab (Weekly X 4)         PAST MEDICAL HISTORY:  ALLERGIES:  No Known Allergies  CURRENT MEDICATIONS:  Outpatient Encounter Medications as of 7/12/2024   Medication Sig Dispense Refill    acetaminophen (TYLENOL) 325 MG tablet Take 2 tablets by mouth Every 6 (Six) Hours As Needed for Mild Pain.      calcium carbonate (OS-MITRA) 600 MG tablet Take 1 tablet by mouth 2 (Two) Times a Day With Meals.      Cholecalciferol (vitamin D3) 125 MCG (5000 UT) capsule capsule Take 1 capsule by mouth Daily.      folic acid (FOLVITE) 1 MG tablet Take 1 tablet by mouth Daily.      metFORMIN (GLUCOPHAGE) 500 MG tablet Take 1 tablet by mouth 2 (Two) Times a Day With Meals.      Multiple Vitamins-Minerals (MULTIVITAMIN WITH MINERALS) tablet tablet Take 1 tablet by mouth Daily.      Omega-3 300 MG capsule Take 4 capsules by mouth Daily.      omeprazole (priLOSEC) 20 MG capsule       pantoprazole (PROTONIX) 40 MG EC tablet Take 1 tablet every day by oral route.      Semaglutide (OZEMPIC, 0.25 OR 0.5 MG/DOSE, SC) Inject  under the skin into the appropriate area as directed.      Syringe 25G X 1\" 3 ML misc USE 1 SYRINGE PER B12 INJECTION 12 each 0    Thyroid 65 MG PO tablet Take 1 tablet by mouth Daily.      VIT B12-METHIONINE-INOS-CHOL IM Inject 1 mL into the appropriate muscle as directed by prescriber Every 30 (Thirty) Days. Has not taken since 2020 at least       [DISCONTINUED] Eliquis 5 MG tablet tablet Take  by mouth 2 (Two) Times a Day.       No facility-administered encounter medications on file as of 7/12/2024.     ADULT ILLNESSES:  Patient Active Problem List   Diagnosis Code    Heart murmur R01.1    Thrombocytopenia D69.6    Marginal zone lymphoma of spleen C83.07    Splenomegaly R16.1    Pancytopenia, " acquired D61.818    Iron deficiency anemia D50.9    Intestinal malabsorption, unspecified K90.9    Transfusion reaction T80.92XA     SURGERIES:  Past Surgical History:   Procedure Laterality Date    CHOLECYSTECTOMY      HYSTERECTOMY  2003    PARTIAL    LIVER BIOPSY  08/12/2019     HEALTH MAINTENANCE ITEMS:  Health Maintenance Due   Topic Date Due    Pneumococcal Vaccine 0-64 (1 of 2 - PCV) Never done    DIABETIC EYE EXAM  Never done    TDAP/TD VACCINES (1 - Tdap) Never done    ZOSTER VACCINE (1 of 2) Never done    ANNUAL PHYSICAL  Never done    DIABETIC FOOT EXAM  Never done    PAP SMEAR  Never done    MAMMOGRAM  11/01/2020    Hepatitis B (1 of 3 - Risk 3-dose series) Never done    COVID-19 Vaccine (4 - 2023-24 season) 09/01/2023       <no information>  Last Completed Colonoscopy            COLORECTAL CANCER SCREENING (COLONOSCOPY - Every 10 Years) Next due on 2/13/2034 02/13/2024  Outside Procedure: [place] COLONOSCOPY FLX DX W/COLLJ SPEC WHEN PFRMD    02/13/2024  Outside Procedure: COLONOSCOPY    10/25/2022  Outside Procedure: COLONOSCOPY    01/01/2009  Outside Procedure: COLONOSCOPY                  There is no immunization history for the selected administration types on file for this patient.  Last Completed Mammogram       This patient has no relevant Health Maintenance data.              FAMILY HISTORY:  Family History   Problem Relation Age of Onset    Alzheimer's disease Mother     Diabetes Father     Stroke Father     Cardiomyopathy Father     Heart attack Father     Heart disease Father     Hypertension Father     Cancer Brother         She is unclear of the kind of cancer    Diabetes Brother     Diabetes Brother     Hypertension Brother     No Known Problems Brother     No Known Problems Son     No Known Problems Son     No Known Problems Daughter      SOCIAL HISTORY:  Social History     Socioeconomic History    Marital status:    Tobacco Use    Smoking status: Never    Smokeless tobacco: Never  "   Tobacco comments:     has tried it but did not keep smoking   Vaping Use    Vaping status: Never Used   Substance and Sexual Activity    Alcohol use: Never    Drug use: No    Sexual activity: Defer       REVIEW OF SYSTEMS:    Review of Systems   Constitutional:  Negative for fatigue, fever and unexpected weight loss.        \"I am good.\"   HENT:  Negative for congestion and nosebleeds.    Eyes:  Negative for blurred vision and redness.   Respiratory:  Negative for shortness of breath and wheezing.    Cardiovascular:  Negative for chest pain and leg swelling.   Gastrointestinal:  Negative for abdominal pain, nausea and vomiting.   Endocrine: Negative for cold intolerance and heat intolerance.   Genitourinary:  Negative for difficulty urinating and dysuria.   Musculoskeletal:  Negative for gait problem and myalgias.   Skin:  Negative for pallor.   Allergic/Immunologic: Negative for food allergies.   Neurological:  Negative for speech difficulty, weakness and confusion.   Hematological:  Negative for adenopathy. Does not bruise/bleed easily.   Psychiatric/Behavioral:  Negative for agitation and hallucinations.          VITAL SIGNS: /62   Pulse 95   Temp 98.4 °F (36.9 °C)   Resp 18   Ht 160 cm (63\")   Wt 63.4 kg (139 lb 12.8 oz)   SpO2 99%   Breastfeeding No   BMI 24.76 kg/m²  Body surface area is 1.66 meters squared.   Pain Score    07/12/24 0845   PainSc: 0-No pain         PHYSICAL EXAMINATION:     Physical Exam  Vitals reviewed.   Constitutional:       General: She is not in acute distress.  HENT:      Head: Normocephalic and atraumatic.   Eyes:      General: No scleral icterus.  Cardiovascular:      Rate and Rhythm: Normal rate.   Pulmonary:      Effort: No respiratory distress.      Breath sounds: No wheezing.   Abdominal:      General: Bowel sounds are normal. There is no distension.      Palpations: Abdomen is soft.   Musculoskeletal:         General: No swelling.      Cervical back: Neck supple. "   Skin:     Coloration: Skin is not pale.   Neurological:      Mental Status: She is alert and oriented to person, place, and time.   Psychiatric:         Mood and Affect: Mood normal.         Behavior: Behavior normal.         Thought Content: Thought content normal.         Judgment: Judgment normal.         LABS    Lab Results - Last 18 Months   Lab Units 07/12/24  0820 05/06/24  0834 01/05/24  1048 12/01/23  1015 11/10/23  0753 11/03/23  0742 10/27/23  0814 10/16/23  0812 10/06/23  1059 08/10/23  0953 08/03/23  1020 07/13/23  1309 07/11/23  1021 06/15/23  0745 04/11/23  1348 02/28/23  0852   HEMOGLOBIN g/dL 14.0 14.0 12.8 13.5 13.8 13.7 13.8 14.4 13.9   < > 5.5*   < > 6.6* 7.1*   < > 9.2*   HEMATOCRIT % 41.3 41.9 39.5 42.0 43.9 44.3 45.0 47.2* 45.6   < > 18.2*   < > 22.8* 23.9*   < > 29.2*   MCV fL 87.3 87.1 85.5 85.0 88.0 89.5 90.0 90.1 91.9   < > 94.3   < > 96.6 94.1   < > 91.3   WBC 10*3/mm3 7.53 6.16 6.36 8.10 7.91 9.97 10.35 8.82 9.14   < > 1.29*   < > 1.11* 0.89*   < > 1.73*   RDW % 13.3 13.9 14.8 14.5 15.8* 15.8* 15.7* 15.8* 16.3*   < > 19.5*   < > 19.8* 19.1*   < > 18.6*   MPV fL 10.4 10.8 10.4 11.2 10.8 11.3 12.0 12.9* 12.3*   < > 10.4   < > 10.9 9.7   < > 10.3   PLATELETS 10*3/mm3 335 300 336 275 266 250 241 192 191   < > 63*   < > 67* 68*   < > 76*   IMM GRAN % % 0.1  --   --   --   --   --   --   --   --   --   --   --   --   --   --  1.2*   NEUTROS ABS 10*3/mm3 3.15 2.55 1.02* 2.15 2.41 2.69 3.73 2.50 2.67   < > 0.90*   < > 0.77* 0.63*   < > 1.20*   LYMPHS ABS 10*3/mm3 3.32*  --   --   --   --   --   --   --   --   --   --   --   --   --   --  0.36*   MONOS ABS 10*3/mm3 0.86  --   --   --   --   --   --   --   --   --   --   --   --   --   --  0.12   EOS ABS 10*3/mm3 0.15 0.25 0.19 0.08 0.17 0.30 0.10 0.26 0.38   < > 0.03  --  0.01 0.01   < > 0.02   BASOS ABS 10*3/mm3 0.04  --   --   --   --   --   --   --   --   --  0.01  --  0.03 0.01  --  0.01   IMMATURE GRANS (ABS) 10*3/mm3 0.01  --   --   --    --   --   --   --   --   --   --   --   --   --   --  0.02   NRBC /100 WBC 0.0  --   --   --   --   --   --   --   --   --  1.0*  --  1.0*  --   --  0.0   NEUTROPHIL % %  --  41.4* 16.0* 26.5* 29.5* 27.0* 35.1* 28.3* 28.1*   < > 64.3   < > 67.0 69.4   < >  --    MONOCYTES % %  --  15.2* 11.0 8.2 10.5 10.0 9.3 9.1 15.6*   < > 3.1*   < > 2.0* 4.1*   < >  --    BASOPHIL % %  --   --   --   --   --   --   --   --   --   --  1.0  --  3.0* 1.0  --   --    ATYP LYMPH % %  --  6.1*  --  26.5* 27.4* 30.0* 9.3*  --  20.8*   < > 1.0   < >  --   --    < >  --    ANISOCYTOSIS   --   --   --  Slight/1+ Slight/1+ Slight/1+ Slight/1+ Slight/1+ Slight/1+   < > Mod/2+   < > Mod/2+ Slight/1+   < >  --    GIANT PLT   --  Slight/1+ Mod/2+  --  Slight/1+ Slight/1+ Large/3+ Large/3+ Large/3+   < >  --   --  Mod/2+ Slight/1+   < >  --     < > = values in this interval not displayed.       Lab Results - Last 18 Months   Lab Units 07/12/24  0820 05/06/24  0834 01/05/24  1048 12/01/23  1015 11/10/23  0753 11/03/23  0742   GLUCOSE mg/dL 130* 128* 119* 125* 142* 158*   SODIUM mmol/L 142 141 139 139 141 140   POTASSIUM mmol/L 4.1 4.2 4.4 4.1 4.3 4.5   CO2 mmol/L 26.0 27.0 25.0 29.0 28.0 30.0*   CHLORIDE mmol/L 105 104 103 101 103 100   ANION GAP mmol/L 11.0 10.0 11.0 9.0 10.0 10.0   CREATININE mg/dL 0.44* 0.45* 0.54* 0.36* 0.50* 0.44*   BUN mg/dL 9 17 12 12 15 12   BUN / CREAT RATIO  20.5 37.8* 22.2 33.3* 30.0* 27.3*   CALCIUM mg/dL 9.4 9.6 9.7 9.6 9.6 9.9   ALK PHOS U/L 128* 147* 238* 231* 342* 365*   TOTAL PROTEIN g/dL 6.7 6.6 6.6 6.7 6.5 6.8   ALT (SGPT) U/L 44* 43* 56* 40* 72* 97*   AST (SGOT) U/L 28 30 40* 33* 43* 56*   BILIRUBIN mg/dL 0.4 0.4 0.5 0.4 0.3 0.4   ALBUMIN g/dL 4.6 4.6 4.5 4.6 4.3 4.5   GLOBULIN gm/dL 2.1 2.0 2.1 2.1 2.2 2.3       Lab Results - Last 18 Months   Lab Units 07/12/24  0820 06/20/24  0750 03/18/24  0940 01/05/24  1048 12/07/23  0654 10/06/23  1059 09/26/23  1414 08/11/23  0848 07/27/23  1550   URIC ACID mg/dL  --    --   --   --   --   --  5.0  --  9.5*   LDH U/L 140 163 151 151 233* 252* 255*   < > 628*    < > = values in this interval not displayed.       Lab Results - Last 18 Months   Lab Units 05/06/24  0834 10/27/23  0814 10/06/23  1059 09/26/23  1414 07/27/23  1550 07/11/23  1021 06/15/23  0745 05/23/23  1036 04/19/23  1307 04/11/23  1348   IRON mcg/dL 72  --  88  --   --  51 50 55  --  56   TIBC mcg/dL  --   --  422  --   --  307 292* 298  --  298   IRON SATURATION (TSAT) %  --   --  21  --   --  17* 17* 18*  --  19*   FERRITIN ng/mL 324.90*  --  860.40* 1,643* 479* 366.50* 379.20* 428.30*  --  259.50*   TSH uIU/mL 2.890 1.380  --   --   --   --  1.560  --  1.330  --    FOLATE ng/mL  --  >20.00  --   --   --   --   --   --   --   --          Lucía Wolff reports a pain score of 0.         ASSESSMENT:  1.  Splenic marginal zone lymphoma.  Splenomegaly 18 cm on 01/19/2022.  Negative PET on 01/25/2022.  No adenopathies on CT of the chest, abdomen and pelvis on 01/19/2022.  Treatment status: Postsplenectomy at [facility] on 8/14/2023.  She was treated with adjuvant rituximab weekly for 4 doses and completed on 11/10/2023.  2.  2% monoclonal B-cell population on previous bone marrow biopsy 10/31/2019, marginal zone lymphoma.  Repeat bone marrow biopsy 03/01/2022.  Less than 1% small monoclonal B-cell population with a nonspecific immune phenotype.  Normal cytogenetics.  Variably hypercellular marrow with maturing trilineage hematopoiesis, erythroid hyperplasia and mild interstitial nodule small lymphocytic infiltrate, 5 to 10% adequate iron storage.  No evidence of overt myelodysplasia or plasma cell neoplasm.  Negative JAK2 and BCR ABL.  Negative Bcl-2, BCL6, 7 q. and MYB 88.    3.  Anemia of chronic disease and component of iron deficiency, history of.  Intolerant to oral iron, severe constipation and stomach upset.  Patient treated with intravenous iron as indicated.  4.  Grade 1 mucositis on 10/17/2023, history of.     5.  Hypersensitivity reaction to rituximab on 10/16/2023.  6.  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  Not associated with increased risk to develop critical symptoms of hemochromatosis.  7. Pancytopenia, from hypersplenism, history of.  8.  Weight loss secondary to early satiety and hypersplenism, history of.  9.  Good performance status of 0.               PLAN:  1.   Re: .  Note from Dr. Pena on 6/20/2024.  Patient feels too well.  WBC 9.6, hemoglobin 14.7, MCV 89 and platelet 135.  Albumin 4.8, AST 48, ALT 77 and alkaline phosphatase 155.  .  PET scan consistent with complete response.  Switch to CT scan for surveillance.  Follow-up in 6 months with CBC, chemistry, CT of the chest, abdomen and pelvis.  2.   Re: Heme status. WBC 7.53, hemoglobin 14,hematocrit 41.3, and platelet 335.    3.   Re: CMP. .2 ml/min, ALT 44 from 43 from 56, AST 28 and alkaline phosphatase 128 from 147 from 238.   4.   Re: LDH at 140.  5.   Re: PET scan 6/20/2024. No FDG avid disease to suggest active or recurrent lymphoma.   2.  Persistent long segment intense uptake within areas of small bowel and colon, compatible with metformin use.  6.   Re: Note from Dr. Pena on 3/18/2024.  WBC 7.5, hemoglobin 13.8, hematocrit 42, MCV 89 and platelet 464.  Alkaline phosphatase 157, AST 41 and ALT 60.  .  Follow-up in 3 months.  7.   Re: Follow-up with Dr. Pena 12/2024.  8.   Re: Stable for observation.   9.   Continue care per primary care physician and the other specialists.  10.  Plan of care discussed with patient.  Understanding expressed.  Patient agreeable to proceed.  11.  Annual flu vaccine, pneumococcal vaccine every 5 years and meningococcal vaccine every 2 to 3 years. Meningococcal vaccine 10/11/2023.   12.  eRx ondansetron 8 mg p.o. every 8 hours as needed for nausea and vomiting #60, 2 refills.  13.  Return  to office in 6 months with CBC with differential, CMP and LDH, same day.                I have reviewed the assessment and plan and verified the accuracy of it. No changes to assessment and plan since the information was documented. Benigno Hastings MD 07/12/24            I spent 32 total minutes, face-to-face, caring for Lucía today. Greater than 50% of this time involved counseling and/or coordination of care as documented within this note.         (Rio Pena MD [place])  (Matthew Sandoval MD)  (Trang Frank MD)  ANAIS Montano

## 2024-07-12 ENCOUNTER — LAB (OUTPATIENT)
Dept: LAB | Facility: HOSPITAL | Age: 64
End: 2024-07-12
Payer: COMMERCIAL

## 2024-07-12 ENCOUNTER — OFFICE VISIT (OUTPATIENT)
Dept: ONCOLOGY | Facility: CLINIC | Age: 64
End: 2024-07-12
Payer: COMMERCIAL

## 2024-07-12 VITALS
BODY MASS INDEX: 24.77 KG/M2 | TEMPERATURE: 98.4 F | RESPIRATION RATE: 18 BRPM | SYSTOLIC BLOOD PRESSURE: 124 MMHG | HEIGHT: 63 IN | DIASTOLIC BLOOD PRESSURE: 62 MMHG | OXYGEN SATURATION: 99 % | HEART RATE: 95 BPM | WEIGHT: 139.8 LBS

## 2024-07-12 DIAGNOSIS — C83.07 MARGINAL ZONE LYMPHOMA OF SPLEEN: ICD-10-CM

## 2024-07-12 DIAGNOSIS — C83.07 MARGINAL ZONE LYMPHOMA OF SPLEEN: Primary | ICD-10-CM

## 2024-07-12 LAB
ALBUMIN SERPL-MCNC: 4.6 G/DL (ref 3.5–5.2)
ALBUMIN/GLOB SERPL: 2.2 G/DL
ALP SERPL-CCNC: 128 U/L (ref 39–117)
ALT SERPL W P-5'-P-CCNC: 44 U/L (ref 1–33)
ANION GAP SERPL CALCULATED.3IONS-SCNC: 11 MMOL/L (ref 5–15)
AST SERPL-CCNC: 28 U/L (ref 1–32)
BASOPHILS # BLD AUTO: 0.04 10*3/MM3 (ref 0–0.2)
BASOPHILS NFR BLD AUTO: 0.5 % (ref 0–1.5)
BILIRUB SERPL-MCNC: 0.4 MG/DL (ref 0–1.2)
BUN SERPL-MCNC: 9 MG/DL (ref 8–23)
BUN/CREAT SERPL: 20.5 (ref 7–25)
CALCIUM SPEC-SCNC: 9.4 MG/DL (ref 8.6–10.5)
CHLORIDE SERPL-SCNC: 105 MMOL/L (ref 98–107)
CO2 SERPL-SCNC: 26 MMOL/L (ref 22–29)
CREAT SERPL-MCNC: 0.44 MG/DL (ref 0.57–1)
DEPRECATED RDW RBC AUTO: 43.1 FL (ref 37–54)
EGFRCR SERPLBLD CKD-EPI 2021: 110.2 ML/MIN/1.73
EOSINOPHIL # BLD AUTO: 0.15 10*3/MM3 (ref 0–0.4)
EOSINOPHIL NFR BLD AUTO: 2 % (ref 0.3–6.2)
ERYTHROCYTE [DISTWIDTH] IN BLOOD BY AUTOMATED COUNT: 13.3 % (ref 12.3–15.4)
GLOBULIN UR ELPH-MCNC: 2.1 GM/DL
GLUCOSE SERPL-MCNC: 130 MG/DL (ref 65–99)
HCT VFR BLD AUTO: 41.3 % (ref 34–46.6)
HGB BLD-MCNC: 14 G/DL (ref 12–15.9)
IMM GRANULOCYTES # BLD AUTO: 0.01 10*3/MM3 (ref 0–0.05)
IMM GRANULOCYTES NFR BLD AUTO: 0.1 % (ref 0–0.5)
LDH SERPL-CCNC: 140 U/L (ref 135–214)
LYMPHOCYTES # BLD AUTO: 3.32 10*3/MM3 (ref 0.7–3.1)
LYMPHOCYTES NFR BLD AUTO: 44.1 % (ref 19.6–45.3)
MCH RBC QN AUTO: 29.6 PG (ref 26.6–33)
MCHC RBC AUTO-ENTMCNC: 33.9 G/DL (ref 31.5–35.7)
MCV RBC AUTO: 87.3 FL (ref 79–97)
MONOCYTES # BLD AUTO: 0.86 10*3/MM3 (ref 0.1–0.9)
MONOCYTES NFR BLD AUTO: 11.4 % (ref 5–12)
NEUTROPHILS NFR BLD AUTO: 3.15 10*3/MM3 (ref 1.7–7)
NEUTROPHILS NFR BLD AUTO: 41.9 % (ref 42.7–76)
NRBC BLD AUTO-RTO: 0 /100 WBC (ref 0–0.2)
PLATELET # BLD AUTO: 335 10*3/MM3 (ref 140–450)
PMV BLD AUTO: 10.4 FL (ref 6–12)
POTASSIUM SERPL-SCNC: 4.1 MMOL/L (ref 3.5–5.2)
PROT SERPL-MCNC: 6.7 G/DL (ref 6–8.5)
RBC # BLD AUTO: 4.73 10*6/MM3 (ref 3.77–5.28)
SODIUM SERPL-SCNC: 142 MMOL/L (ref 136–145)
WBC NRBC COR # BLD AUTO: 7.53 10*3/MM3 (ref 3.4–10.8)

## 2024-07-12 PROCEDURE — 80053 COMPREHEN METABOLIC PANEL: CPT

## 2024-07-12 PROCEDURE — 83615 LACTATE (LD) (LDH) ENZYME: CPT

## 2024-07-12 PROCEDURE — 36415 COLL VENOUS BLD VENIPUNCTURE: CPT

## 2024-07-12 PROCEDURE — 85025 COMPLETE CBC W/AUTO DIFF WBC: CPT

## 2024-09-03 ENCOUNTER — TRANSCRIBE ORDERS (OUTPATIENT)
Dept: ADMINISTRATIVE | Facility: HOSPITAL | Age: 64
End: 2024-09-03
Payer: COMMERCIAL

## 2024-09-03 DIAGNOSIS — E78.5 HYPERLIPIDEMIA, UNSPECIFIED HYPERLIPIDEMIA TYPE: ICD-10-CM

## 2024-09-03 DIAGNOSIS — K76.0 FATTY LIVER: Primary | ICD-10-CM

## 2024-09-03 DIAGNOSIS — E11.9 DIABETES MELLITUS WITHOUT COMPLICATION: ICD-10-CM

## 2024-09-03 DIAGNOSIS — E03.9 HYPOTHYROIDISM, ADULT: Primary | ICD-10-CM

## 2024-10-01 ENCOUNTER — LAB (OUTPATIENT)
Dept: LAB | Facility: HOSPITAL | Age: 64
End: 2024-10-01
Payer: COMMERCIAL

## 2024-10-01 DIAGNOSIS — E11.9 DIABETES MELLITUS WITHOUT COMPLICATION: ICD-10-CM

## 2024-10-01 DIAGNOSIS — E03.9 HYPOTHYROIDISM, ADULT: ICD-10-CM

## 2024-10-01 DIAGNOSIS — K76.0 FATTY LIVER: ICD-10-CM

## 2024-10-01 DIAGNOSIS — C83.07 MARGINAL ZONE LYMPHOMA OF SPLEEN: ICD-10-CM

## 2024-10-01 DIAGNOSIS — E78.5 HYPERLIPIDEMIA, UNSPECIFIED HYPERLIPIDEMIA TYPE: ICD-10-CM

## 2024-10-01 LAB
ACANTHOCYTES BLD QL SMEAR: ABNORMAL
ALBUMIN SERPL-MCNC: 4.5 G/DL (ref 3.5–5.2)
ALBUMIN UR-MCNC: <1.2 MG/DL
ALBUMIN/GLOB SERPL: 2.1 G/DL
ALP SERPL-CCNC: 94 U/L (ref 39–117)
ALPHA-FETOPROTEIN: 3.49 NG/ML (ref 0–8.3)
ALT SERPL W P-5'-P-CCNC: 30 U/L (ref 1–33)
ANION GAP SERPL CALCULATED.3IONS-SCNC: 11 MMOL/L (ref 5–15)
ANISOCYTOSIS BLD QL: ABNORMAL
AST SERPL-CCNC: 26 U/L (ref 1–32)
BASOPHILS # BLD MANUAL: 0.08 10*3/MM3 (ref 0–0.2)
BASOPHILS NFR BLD MANUAL: 1 % (ref 0–1.5)
BILIRUB SERPL-MCNC: 0.4 MG/DL (ref 0–1.2)
BUN SERPL-MCNC: 10 MG/DL (ref 8–23)
BUN/CREAT SERPL: 20.8 (ref 7–25)
BURR CELLS BLD QL SMEAR: ABNORMAL
CALCIUM SPEC-SCNC: 9.3 MG/DL (ref 8.6–10.5)
CHLORIDE SERPL-SCNC: 104 MMOL/L (ref 98–107)
CHOLEST SERPL-MCNC: 226 MG/DL (ref 0–200)
CO2 SERPL-SCNC: 25 MMOL/L (ref 22–29)
CREAT SERPL-MCNC: 0.48 MG/DL (ref 0.57–1)
CREAT UR-MCNC: 67.4 MG/DL
DEPRECATED RDW RBC AUTO: 43.9 FL (ref 37–54)
EGFRCR SERPLBLD CKD-EPI 2021: 107.2 ML/MIN/1.73
ELLIPTOCYTES BLD QL SMEAR: ABNORMAL
EOSINOPHIL # BLD MANUAL: 0.08 10*3/MM3 (ref 0–0.4)
EOSINOPHIL NFR BLD MANUAL: 1 % (ref 0.3–6.2)
ERYTHROCYTE [DISTWIDTH] IN BLOOD BY AUTOMATED COUNT: 13.5 % (ref 12.3–15.4)
GLOBULIN UR ELPH-MCNC: 2.1 GM/DL
GLUCOSE SERPL-MCNC: 129 MG/DL (ref 65–99)
HBA1C MFR BLD: 5.7 % (ref 4.8–5.6)
HCT VFR BLD AUTO: 42.5 % (ref 34–46.6)
HDLC SERPL-MCNC: 64 MG/DL (ref 40–60)
HGB BLD-MCNC: 14.1 G/DL (ref 12–15.9)
INR PPP: 0.88 (ref 0.91–1.09)
LDLC SERPL CALC-MCNC: 127 MG/DL (ref 0–100)
LDLC/HDLC SERPL: 1.91 {RATIO}
LYMPHOCYTES # BLD MANUAL: 3.6 10*3/MM3 (ref 0.7–3.1)
LYMPHOCYTES NFR BLD MANUAL: 9 % (ref 5–12)
MCH RBC QN AUTO: 29.5 PG (ref 26.6–33)
MCHC RBC AUTO-ENTMCNC: 33.2 G/DL (ref 31.5–35.7)
MCV RBC AUTO: 88.9 FL (ref 79–97)
MICROALBUMIN/CREAT UR: NORMAL MG/G{CREAT}
MONOCYTES # BLD: 0.69 10*3/MM3 (ref 0.1–0.9)
NEUTROPHILS # BLD AUTO: 3.22 10*3/MM3 (ref 1.7–7)
NEUTROPHILS NFR BLD MANUAL: 41 % (ref 42.7–76)
NEUTS BAND NFR BLD MANUAL: 1 % (ref 0–5)
NRBC SPEC MANUAL: 1 /100 WBC (ref 0–0.2)
OVALOCYTES BLD QL SMEAR: ABNORMAL
PLAT MORPH BLD: NORMAL
PLATELET # BLD AUTO: 341 10*3/MM3 (ref 140–450)
PMV BLD AUTO: 10.6 FL (ref 6–12)
POIKILOCYTOSIS BLD QL SMEAR: ABNORMAL
POTASSIUM SERPL-SCNC: 4.2 MMOL/L (ref 3.5–5.2)
PROT SERPL-MCNC: 6.6 G/DL (ref 6–8.5)
PROTHROMBIN TIME: 12.3 SECONDS (ref 11.8–14.8)
RBC # BLD AUTO: 4.78 10*6/MM3 (ref 3.77–5.28)
SODIUM SERPL-SCNC: 140 MMOL/L (ref 136–145)
T3FREE SERPL-MCNC: 4.59 PG/ML (ref 2–4.4)
T4 FREE SERPL-MCNC: 1.04 NG/DL (ref 0.93–1.7)
TARGETS BLD QL SMEAR: ABNORMAL
TRIGL SERPL-MCNC: 200 MG/DL (ref 0–150)
TSH SERPL DL<=0.05 MIU/L-ACNC: 1.4 UIU/ML (ref 0.27–4.2)
VARIANT LYMPHS NFR BLD MANUAL: 21 % (ref 0–5)
VARIANT LYMPHS NFR BLD MANUAL: 26 % (ref 19.6–45.3)
VLDLC SERPL-MCNC: 35 MG/DL (ref 5–40)
WBC MORPH BLD: NORMAL
WBC NRBC COR # BLD AUTO: 7.67 10*3/MM3 (ref 3.4–10.8)

## 2024-10-01 PROCEDURE — 82043 UR ALBUMIN QUANTITATIVE: CPT

## 2024-10-01 PROCEDURE — 84481 FREE ASSAY (FT-3): CPT

## 2024-10-01 PROCEDURE — 36415 COLL VENOUS BLD VENIPUNCTURE: CPT

## 2024-10-01 PROCEDURE — 80050 GENERAL HEALTH PANEL: CPT

## 2024-10-01 PROCEDURE — 82105 ALPHA-FETOPROTEIN SERUM: CPT

## 2024-10-01 PROCEDURE — 85007 BL SMEAR W/DIFF WBC COUNT: CPT

## 2024-10-01 PROCEDURE — 85610 PROTHROMBIN TIME: CPT

## 2024-10-01 PROCEDURE — 82570 ASSAY OF URINE CREATININE: CPT

## 2024-10-01 PROCEDURE — 84482 T3 REVERSE: CPT

## 2024-10-01 PROCEDURE — 84439 ASSAY OF FREE THYROXINE: CPT

## 2024-10-01 PROCEDURE — 83036 HEMOGLOBIN GLYCOSYLATED A1C: CPT

## 2024-10-01 PROCEDURE — 80061 LIPID PANEL: CPT

## 2024-10-07 LAB — T3REVERSE SERPL-MCNC: 17.3 NG/DL (ref 9.2–24.1)

## 2024-12-07 ENCOUNTER — HOSPITAL ENCOUNTER (EMERGENCY)
Facility: HOSPITAL | Age: 64
Discharge: HOME | End: 2024-12-07
Attending: STUDENT IN AN ORGANIZED HEALTH CARE EDUCATION/TRAINING PROGRAM
Payer: COMMERCIAL

## 2024-12-07 ENCOUNTER — APPOINTMENT (OUTPATIENT)
Dept: CT IMAGING | Facility: HOSPITAL | Age: 64
End: 2024-12-07
Payer: COMMERCIAL

## 2024-12-07 VITALS
HEART RATE: 78 BPM | RESPIRATION RATE: 18 BRPM | BODY MASS INDEX: 22.86 KG/M2 | WEIGHT: 129 LBS | DIASTOLIC BLOOD PRESSURE: 76 MMHG | TEMPERATURE: 98 F | OXYGEN SATURATION: 98 % | SYSTOLIC BLOOD PRESSURE: 102 MMHG | HEIGHT: 63 IN

## 2024-12-07 DIAGNOSIS — S09.90XA TRAUMATIC INJURY OF HEAD, INITIAL ENCOUNTER: ICD-10-CM
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
## 2024-12-07 PROCEDURE — 99284 EMERGENCY DEPT VISIT MOD MDM: CPT

## 2024-12-07 PROCEDURE — 70450 CT HEAD/BRAIN W/O DYE: CPT

## 2024-12-07 PROCEDURE — 72125 CT NECK SPINE W/O DYE: CPT

## 2024-12-07 RX ORDER — ACETAMINOPHEN 325 MG/1
650 TABLET ORAL ONCE
Status: COMPLETED | OUTPATIENT
Start: 2024-12-07 | End: 2024-12-07

## 2024-12-07 RX ADMIN — ACETAMINOPHEN 650 MG: 325 TABLET ORAL at 21:34

## 2024-12-08 NOTE — ED PROVIDER NOTES
Subjective   History of Present Illness  This is 64 y.o F positive for mechanical fall, no LOC, nausea, vomiting or change in personality. Denies abd pain, chest pain or back pain. Positive for mild neck pain. PT has a history of lymphoma with previous reassuring biopsy. She also reports L hand finger pain from fall. No chest pain, palpitations.         Review of Systems   All other systems reviewed and are negative.      Past Medical History:   Diagnosis Date    Diabetes mellitus     Disease of thyroid gland     Fatty liver     Heart murmur     Heart murmur 10/28/2019    Leukopenia     Marginal zone lymphoma of spleen 3/28/2022    Thrombocytopenia        No Known Allergies    Past Surgical History:   Procedure Laterality Date    CHOLECYSTECTOMY      HYSTERECTOMY  2003    PARTIAL    LIVER BIOPSY  08/12/2019       Family History   Problem Relation Age of Onset    Alzheimer's disease Mother     Diabetes Father     Stroke Father     Cardiomyopathy Father     Heart attack Father     Heart disease Father     Hypertension Father     Cancer Brother         She is unclear of the kind of cancer    Diabetes Brother     Diabetes Brother     Hypertension Brother     No Known Problems Brother     No Known Problems Son     No Known Problems Son     No Known Problems Daughter        Social History     Socioeconomic History    Marital status:    Tobacco Use    Smoking status: Never    Smokeless tobacco: Never    Tobacco comments:     has tried it but did not keep smoking   Vaping Use    Vaping status: Never Used   Substance and Sexual Activity    Alcohol use: Never    Drug use: No    Sexual activity: Defer           Objective   Physical Exam  Constitutional:       General: She is not in acute distress.     Appearance: Normal appearance.   HENT:      Head: Normocephalic and atraumatic.      Right Ear: Tympanic membrane normal.      Left Ear: Tympanic membrane normal.      Nose: Nose normal.      Mouth/Throat:      Pharynx:  No oropharyngeal exudate.   Eyes:      Extraocular Movements: Extraocular movements intact.      Pupils: Pupils are equal, round, and reactive to light.   Cardiovascular:      Rate and Rhythm: Normal rate and regular rhythm.      Heart sounds: No murmur heard.     No friction rub. No gallop.   Pulmonary:      Effort: Pulmonary effort is normal. No respiratory distress.      Breath sounds: Normal breath sounds. No stridor. No wheezing, rhonchi or rales.   Chest:      Chest wall: No tenderness.   Abdominal:      General: Abdomen is flat. Bowel sounds are normal. There is no distension.      Palpations: Abdomen is soft. There is no mass.      Tenderness: There is no abdominal tenderness.      Hernia: No hernia is present.   Musculoskeletal:         General: No swelling, tenderness, deformity or signs of injury. Normal range of motion.      Cervical back: Normal range of motion. No rigidity or tenderness.      Left lower leg: No edema.      Comments: No concerns for hand, finger or wrist fracture.    Lymphadenopathy:      Cervical: No cervical adenopathy.   Skin:     General: Skin is warm.   Neurological:      General: No focal deficit present.      Mental Status: She is alert and oriented to person, place, and time. Mental status is at baseline.      Cranial Nerves: No cranial nerve deficit.      Sensory: No sensory deficit.      Motor: No weakness.      Gait: Gait normal.         Procedures           ED Course                                                       Medical Decision Making  This is a 64 y.o F no blood thinners, positive for mechanical fall. No concerns for fractures. Neuro intact. Share medical decision for imaging done, results negative. Tylenol given for frontal soft tissue swelling. CT brain and neck negative, patient stable for discharge.     Problems Addressed:  Fall, initial encounter: complicated acute illness or injury  Traumatic injury of head, initial encounter: complicated acute illness or  injury    Amount and/or Complexity of Data Reviewed  Radiology: ordered.    Risk  OTC drugs.        Final diagnoses:   Fall, initial encounter   Traumatic injury of head, initial encounter       ED Disposition  ED Disposition       ED Disposition   Discharge    Condition   Stable    Comment   --               Maylin Rangel, APRN  [address] [facility] [address]  [phone]    In 1 week           Medication List      No changes were made to your prescriptions during this visit.            Daniel Garcia MD  12/08/24 [id number]

## 2024-12-19 ENCOUNTER — TELEPHONE (OUTPATIENT)
Dept: ONCOLOGY | Facility: CLINIC | Age: 64
End: 2024-12-19

## 2024-12-19 NOTE — TELEPHONE ENCOUNTER
Caller: Lucía Wolff    Relationship: Self    Best call back number: [phone]     What is the best time to reach you: ANYTIME    Who are you requesting to speak with (clinical staff, provider,  specific staff member): CLINICAL    What was the call regarding: LUCÍA STATED SHE JUST HAD AN APPT AT [facility] WITH DR SOLORZANO'S OFFICE - ALL LABS WERE FINE. WANTED TO KNOW IF SHE CAN PUSH OUT HER 1/13/25 F/U APPT/LABS UNTIL SOME TIME IN MARCH.    PATIENT ALSO WANTS TO KNOW THE DATE OF HER INITIAL CANCER DIAGNOSIS.     PLEASE ADVISE.

## 2025-01-06 ENCOUNTER — TRANSCRIBE ORDERS (OUTPATIENT)
Dept: ADMINISTRATIVE | Facility: HOSPITAL | Age: 65
End: 2025-01-06
Payer: COMMERCIAL

## 2025-01-06 DIAGNOSIS — E11.9 TYPE 2 DIABETES MELLITUS WITHOUT COMPLICATION, UNSPECIFIED WHETHER LONG TERM INSULIN USE: Primary | ICD-10-CM

## 2025-01-06 DIAGNOSIS — E03.9 HYPOTHYROIDISM, UNSPECIFIED TYPE: ICD-10-CM

## 2025-01-06 DIAGNOSIS — E78.5 HYPERLIPIDEMIA, UNSPECIFIED HYPERLIPIDEMIA TYPE: ICD-10-CM

## 2025-03-11 ENCOUNTER — TRANSCRIBE ORDERS (OUTPATIENT)
Dept: ADMINISTRATIVE | Facility: HOSPITAL | Age: 65
End: 2025-03-11
Payer: COMMERCIAL

## 2025-03-11 DIAGNOSIS — E11.9 TYPE 2 DIABETES MELLITUS WITHOUT COMPLICATION, UNSPECIFIED WHETHER LONG TERM INSULIN USE: ICD-10-CM

## 2025-03-11 DIAGNOSIS — E03.9 HYPOTHYROIDISM, UNSPECIFIED TYPE: ICD-10-CM

## 2025-03-11 DIAGNOSIS — E78.5 HYPERLIPIDEMIA, UNSPECIFIED HYPERLIPIDEMIA TYPE: ICD-10-CM

## 2025-03-11 DIAGNOSIS — I10 ESSENTIAL (PRIMARY) HYPERTENSION: Primary | ICD-10-CM

## 2025-03-13 NOTE — PROGRESS NOTES
[facility] HEMATOLOGY & ONCOLOGY  [address]  [facility] [address] [phone]    Patient Name: Lucía Wolff  Encounter Date: 03/20/2025  YOB: 1960  Patient Number: [id number]      REASON FOR FOLLOW-UP: Lucía Wolff is a pleasant 64 y.o. 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  Patient had a bone marrow biopsy and showed B-cell lymphoma. She is also seen for splenomegaly due to B-cell lymphoma.  The patient is seen 16 months post weekly rituximab x 4 doses.  Patient is seen alone. History obtained from the patient.  Patient is a reliable historian.          Problem List Items Addressed This Visit    None    Oncology/Hematology History Overview Note   DIAGNOSTIC ABNORMALITIES:  CBC 01/17/2022. Remarkable for platelet 97. Bilirubin 1.9.   CT neck 01/19/2022.  No cervical lymphadenopathy by size criteria.  Asymmetric appearance of the left submandibular gland. Correlation for sialoadenitis recommended. If indicated, dedicated ultrasound may be Performed.  CT chest 01/19/2022. No intrathoracic lymphadenopathy. No axillary lymphadenopathy  CT abdomen and pelvis 01/19/2022. Massive splenomegaly.  Splenic artery aneurysm, 1.5 cm.  No additional abdominal or pelvic lymphadenopathy.  PET 01/25/2022. No hypermetabolic lymphadenopathy or tumor. No scintigraphic evidence of malignancy. . Persistent prominent splenomegaly.  Recommendations from [place] on 05/12/2022.  The palpable splenomegaly in the absence of additional findings to suspect avid adenopathy and liver disease suggested the patient has hypersplenism.  Other differential include nutritional deficiencies or cyclical neutropenia.  The enlarging spleen is not significantly PET avid therefore splenectomy would not be recommended at this time. Other reason is infection with postsplenectomy state. Would recommend splenectomy if  the patient continues to develop new symptoms for example losing another 10 pounds in 6 months, recurrent major infections, transfusion dependent cytopenias or splenic infarcts.   JAK2 was negative, normal copper at 123.2, B12 at 748, reticulocyte 4%, negative hepatitis C antibody, uric acid 8, WBC 3.8, hemoglobin 12.4, MCV 89, platelet 116, bilirubin 2.5, , and ferritin 262 at [place].   Seen by ANAIS Hamm on 7/27/2023.  Plan for splenectomy at [place].  Pathology report 8/14/2023.  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  NYD 88 negative.  PET scan from [place] on 9/22/2023.  No evidence for FDG avid lymphoma.  Increased size of a moderate right pleural effusion when compared to 8/18/2023, with resolution of the left pleural effusion.    Note from  on 9/26/2023.  Patient felt well and healed from surgery.  PET scan 9/22/2023 failed to show significant adenopathy.  WBC 8, ANC 2.96, hemoglobin 14.1, hematocrit 46, MCV 92 and platelet 194.  CMP remarkable for alkaline phosphatase 733, AST 76 and ALT 89.  Ferritin 1643.  .  Uric acid 5.  Discussed with patient that she is no evidence of disease but not cured of the marginal zone lymphoma because anticipate marrow will remain positive and margins of lymphoma can have late relapse.  We can observe until symptoms arise and then give zanabrutinib or we can give 4 doses of rituximab to deepen the response.  Patient wants to get rituximab locally.  Follow-up in 3 months.  PET scan 1 month after fourth dose of rituximab         PREVIOUS INTERVENTIONS:  Ferrous sulfate 4/11/2023 to 4/24/2023, intolerant, severe constipation and GI upset.  Venofer 200 mg 5/8/2023 and 5/15/2023.  Rituximab 10/16/2023 with hypersensitivity reaction, 5 minutes into infusion.  Rituximab 10/20/2023, 10/27/2023, 11/3/2023 and 11/10/2023.     Marginal zone lymphoma of spleen   3/28/2022 Initial Diagnosis     "Marginal zone lymphoma of spleen (HCC)     10/20/2023 - 11/10/2023 Chemotherapy    OP LYMPHOMA (CLL) RiTUXimab (Weekly X 4)         PAST MEDICAL HISTORY:  ALLERGIES:  No Known Allergies  CURRENT MEDICATIONS:  Outpatient Encounter Medications as of 3/20/2025   Medication Sig Dispense Refill    acetaminophen (TYLENOL) 325 MG tablet Take 2 tablets by mouth Every 6 (Six) Hours As Needed for Mild Pain.      calcium carbonate (OS-MITRA) 600 MG tablet Take 1 tablet by mouth 2 (Two) Times a Day With Meals.      Cholecalciferol (vitamin D3) 125 MCG (5000 UT) capsule capsule Take 1 capsule by mouth Daily.      folic acid (FOLVITE) 1 MG tablet Take 1 tablet by mouth Daily.      metFORMIN (GLUCOPHAGE) 500 MG tablet Take 1 tablet by mouth 2 (Two) Times a Day With Meals.      Multiple Vitamins-Minerals (MULTIVITAMIN WITH MINERALS) tablet tablet Take 1 tablet by mouth Daily.      omeprazole (priLOSEC) 20 MG capsule As Needed.      pantoprazole (PROTONIX) 40 MG EC tablet Take 1 tablet every day by oral route.      Syringe 25G X 1\" 3 ML misc USE 1 SYRINGE PER B12 INJECTION 12 each 0    Thyroid 65 MG PO tablet Take 1 tablet by mouth Daily.      VIT B12-METHIONINE-INOS-CHOL IM Inject 1 mL into the appropriate muscle as directed by prescriber Every 30 (Thirty) Days. Has not taken since 2020 at least       [DISCONTINUED] Semaglutide (OZEMPIC, 0.25 OR 0.5 MG/DOSE, SC) Inject  under the skin into the appropriate area as directed.      [DISCONTINUED] Omega-3 300 MG capsule Take 4 capsules by mouth Daily.       No facility-administered encounter medications on file as of 3/20/2025.     ADULT ILLNESSES:  Patient Active Problem List   Diagnosis Code    Heart murmur R01.1    Thrombocytopenia D69.6    Marginal zone lymphoma of spleen C83.07    Splenomegaly R16.1    Pancytopenia, acquired D61.818    Iron deficiency anemia D50.9    Intestinal malabsorption, unspecified K90.9    Transfusion reaction T80.92XA     SURGERIES:  Past Surgical History: "   Procedure Laterality Date    CHOLECYSTECTOMY      HYSTERECTOMY  2003    PARTIAL    LIVER BIOPSY  08/12/2019     HEALTH MAINTENANCE ITEMS:  Health Maintenance Due   Topic Date Due    DIABETIC FOOT EXAM  Never done    DIABETIC EYE EXAM  Never done    Pneumococcal Vaccine 50+ (1 of 2 - PCV) Never done    TDAP/TD VACCINES (1 - Tdap) Never done    ANNUAL PHYSICAL  Never done    PAP SMEAR  Never done    MAMMOGRAM  11/01/2020    Hepatitis B (1 of 3 - Risk 3-dose series) Never done    COVID-19 Vaccine (4 - 2024-25 season) 09/01/2024    ZOSTER VACCINE (2 of 2) 10/31/2024       <no information>  Last Completed Colonoscopy            Needs Review       COLORECTAL CANCER SCREENING (COLONOSCOPY - Every 10 Years) Tentatively due on 2/13/2034 02/13/2024  Outside Procedure: COLONOSCOPY    01/01/2023  Outside Procedure: COLONOSCOPY    10/25/2022  Outside Procedure: COLONOSCOPY    01/01/2009  Outside Procedure: COLONOSCOPY                          There is no immunization history for the selected administration types on file for this patient.  Last Completed Mammogram    This patient has no relevant Health Maintenance data.           FAMILY HISTORY:  Family History   Problem Relation Age of Onset    Alzheimer's disease Mother     Diabetes Father     Stroke Father     Cardiomyopathy Father     Heart attack Father     Heart disease Father     Hypertension Father     Cancer Brother         She is unclear of the kind of cancer    Diabetes Brother     Diabetes Brother     Hypertension Brother     No Known Problems Brother     No Known Problems Son     No Known Problems Son     No Known Problems Daughter      SOCIAL HISTORY:  Social History     Socioeconomic History    Marital status:    Tobacco Use    Smoking status: Never    Smokeless tobacco: Never    Tobacco comments:     has tried it but did not keep smoking   Vaping Use    Vaping status: Never Used   Substance and Sexual Activity    Alcohol use: Never    Drug use: No  "   Sexual activity: Defer       REVIEW OF SYSTEMS:    Review of Systems   Constitutional:  Negative for fatigue and fever.        \"I feel good.\"   HENT:  Negative for congestion, mouth sores and nosebleeds.    Eyes:  Negative for discharge and redness.   Respiratory:  Negative for shortness of breath and wheezing.    Cardiovascular:  Negative for chest pain and palpitations.   Gastrointestinal:  Negative for abdominal pain, nausea and vomiting.   Endocrine: Negative for cold intolerance and heat intolerance.   Genitourinary:  Negative for difficulty urinating and dysuria.   Musculoskeletal:  Negative for gait problem and myalgias.   Skin:  Negative for pallor.   Allergic/Immunologic: Negative for food allergies.   Neurological:  Negative for speech difficulty, weakness and confusion.   Hematological:  Negative for adenopathy. Does not bruise/bleed easily.   Psychiatric/Behavioral:  Negative for agitation and hallucinations. The patient is not nervous/anxious.          VITAL SIGNS: /64   Pulse 76   Temp 98.1 °F (36.7 °C)   Resp 16   Ht 160 cm (63\")   Wt 57.3 kg (126 lb 6.4 oz)   SpO2 99%   Breastfeeding No   BMI 22.39 kg/m²  Body surface area is 1.59 meters squared. Lost 13 pounds. \"I did Ozempic. Nothing taste good.\"  Pain Score    03/20/25 1048   PainSc: 0-No pain           PHYSICAL EXAMINATION:     Physical Exam  Vitals reviewed.   Constitutional:       General: She is not in acute distress.  HENT:      Head: Normocephalic and atraumatic.   Eyes:      General: No scleral icterus.  Cardiovascular:      Rate and Rhythm: Normal rate.   Pulmonary:      Effort: No respiratory distress.      Breath sounds: No wheezing.   Abdominal:      General: Bowel sounds are normal.      Palpations: Abdomen is soft.      Tenderness: There is no abdominal tenderness.   Musculoskeletal:         General: No swelling.      Cervical back: Neck supple.   Skin:     Coloration: Skin is not pale.   Neurological:      Mental " Status: She is alert and oriented to person, place, and time.   Psychiatric:         Mood and Affect: Mood normal.         Behavior: Behavior normal.         Thought Content: Thought content normal.         Judgment: Judgment normal.         LABS    Lab Results - Last 18 Months   Lab Units 03/20/25  1038 10/01/24  0843 07/12/24  0820 05/06/24  0834 01/05/24  1048 12/01/23  1015 11/10/23  0753 11/03/23  0742 10/27/23  0814 10/16/23  0812   HEMOGLOBIN g/dL 13.1 14.1 14.0 14.0 12.8 13.5 13.8 13.7 13.8 14.4   HEMATOCRIT % 39.8 42.5 41.3 41.9 39.5 42.0 43.9 44.3 45.0 47.2*   MCV fL 87.1 88.9 87.3 87.1 85.5 85.0 88.0 89.5 90.0 90.1   WBC 10*3/mm3 7.36 7.67 7.53 6.16 6.36 8.10 7.91 9.97 10.35 8.82   RDW % 13.3 13.5 13.3 13.9 14.8 14.5 15.8* 15.8* 15.7* 15.8*   MPV fL 9.8 10.6 10.4 10.8 10.4 11.2 10.8 11.3 12.0 12.9*   PLATELETS 10*3/mm3 378 341 335 300 336 275 266 250 241 192   IMM GRAN % % 0.3  --  0.1  --   --   --   --   --   --   --    NEUTROS ABS 10*3/mm3 3.78 3.22 3.15 2.55 1.02* 2.15 2.41 2.69 3.73 2.50   LYMPHS ABS 10*3/mm3 2.55  --  3.32*  --   --   --   --   --   --   --    MONOS ABS 10*3/mm3 0.81  --  0.86  --   --   --   --   --   --   --    EOS ABS 10*3/mm3 0.16 0.08 0.15 0.25 0.19 0.08 0.17 0.30 0.10 0.26   BASOS ABS 10*3/mm3 0.04 0.08 0.04  --   --   --   --   --   --   --    IMMATURE GRANS (ABS) 10*3/mm3 0.02  --  0.01  --   --   --   --   --   --   --    NRBC /100 WBC  --  1.0* 0.0  --   --   --   --   --   --   --    NEUTROPHIL % %  --  41.0*  --  41.4* 16.0* 26.5* 29.5* 27.0* 35.1* 28.3*   MONOCYTES % %  --  9.0  --  15.2* 11.0 8.2 10.5 10.0 9.3 9.1   BASOPHIL % %  --  1.0  --   --   --   --   --   --   --   --    ATYP LYMPH % %  --  21.0*  --  6.1*  --  26.5* 27.4* 30.0* 9.3*  --    ANISOCYTOSIS   --  Slight/1+  --   --   --  Slight/1+ Slight/1+ Slight/1+ Slight/1+ Slight/1+   GIANT PLT   --   --   --  Slight/1+ Mod/2+  --  Slight/1+ Slight/1+ Large/3+ Large/3+       Lab Results - Last 18 Months   Lab  Units 03/20/25  1038 10/01/24  0843 07/12/24  0820 05/06/24  0834 01/05/24  1048 12/01/23  1015   GLUCOSE mg/dL 108* 129* 130* 128* 119* 125*   SODIUM mmol/L 137 140 142 141 139 139   POTASSIUM mmol/L 4.0 4.2 4.1 4.2 4.4 4.1   CO2 mmol/L 24.0 25.0 26.0 27.0 25.0 29.0   CHLORIDE mmol/L 102 104 105 104 103 101   ANION GAP mmol/L 11.0 11.0 11.0 10.0 11.0 9.0   CREATININE mg/dL 0.39* 0.48* 0.44* 0.45* 0.54* 0.36*   BUN mg/dL 9 10 9 17 12 12   BUN / CREAT RATIO  23.1 20.8 20.5 37.8* 22.2 33.3*   CALCIUM mg/dL 9.5 9.3 9.4 9.6 9.7 9.6   ALK PHOS U/L 92 94 128* 147* 238* 231*   TOTAL PROTEIN g/dL 6.8 6.6 6.7 6.6 6.6 6.7   ALT (SGPT) U/L 60* 30 44* 43* 56* 40*   AST (SGOT) U/L 44* 26 28 30 40* 33*   BILIRUBIN mg/dL 0.4 0.4 0.4 0.4 0.5 0.4   ALBUMIN g/dL 4.6 4.5 4.6 4.6 4.5 4.6   GLOBULIN gm/dL 2.2 2.1 2.1 2.0 2.1 2.1       Lab Results - Last 18 Months   Lab Units 03/20/25  1038 12/10/24  1347 07/12/24  0820 06/20/24  0750 03/18/24  0940 01/05/24  1048 10/06/23  1059 09/26/23  1414   URIC ACID mg/dL  --   --   --   --   --   --   --  5.0   LDH U/L 120* 127 140 163 151 151   < > 255*    < > = values in this interval not displayed.       Lab Results - Last 18 Months   Lab Units 03/20/25  1038 10/01/24  0843 05/06/24  0834 10/27/23  0814 10/06/23  1059 09/26/23  1414   IRON mcg/dL  --   --  72  --  88  --    TIBC mcg/dL  --   --   --   --  422  --    IRON SATURATION (TSAT) %  --   --   --   --  21  --    FERRITIN ng/mL  --   --  324.90*  --  860.40* 1,643*   TSH uIU/mL 0.915 1.400 2.890 1.380  --   --    FOLATE ng/mL  --   --   --  >20.00  --   --          Lucía Wolff reports a pain score of 0.          ASSESSMENT:  1.  Splenic marginal zone lymphoma.  Splenomegaly 18 cm on 01/19/2022.  Negative PET on 01/25/2022.  No adenopathies on CT of the chest, abdomen and pelvis on 01/19/2022.  Treatment status: Postsplenectomy at [place] on 8/14/2023.  The patient was treated with adjuvant rituximab weekly for 4 doses and  completed on 11/10/2023.  2.  2% monoclonal B-cell population on previous bone marrow biopsy 10/31/2019, marginal zone lymphoma.  Repeat bone marrow biopsy 03/01/2022.  Less than 1% small monoclonal B-cell population with a nonspecific immune phenotype.  Normal cytogenetics.  Variably hypercellular marrow with maturing trilineage hematopoiesis, erythroid hyperplasia and mild interstitial nodule small lymphocytic infiltrate, 5 to 10% adequate iron storage.  No evidence of overt myelodysplasia or plasma cell neoplasm.  Negative JAK2 and BCR ABL.  Negative Bcl-2, BCL6, 7 q. and MYB 88.    3.  Anemia of chronic disease and component of iron deficiency, history of.  Intolerant to oral iron, severe constipation and stomach upset.  Therapy with intravenous iron as needed.  4.  Grade 1 mucositis on 10/17/2023, history of.    5.  Hypersensitivity reaction to rituximab on 10/16/2023.  6.  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  Not associated with increased risk to develop critical symptoms of hemochromatosis.  7. Pancytopenia, from hypersplenism, history of.  8.  Weight loss secondary to early satiety and hypersplenism, history of.  9.  Performance status of 0.               PLAN:  1.   Re:  Note from Dr. Pena 12/10/2024.  WBC 9.3, hemoglobin 12.1, MCV 85 and platelet 313.  Normal LFTs.  CT chest, abdomen and pelvis showed prior splenectomy.  No pathologically enlarged lymph nodes within the chest, abdomen or pelvis.  Follow-up in 6 months with CBC with differential ,CMP, CT chest, abdomen and pelvis.  2.   Re: Heme status. WBC 7.36, hemoglobin 13.1,hematocrit 39.8, and platelet 378.    3.   Re: CMP. .8 ml/min, ALT 60, AST 44 and alkaline phosphatase 92. She is on rosuvastatin three times a week.   4.   Re: LDH at 120.  5.   Re: Alternate follow-ups locally and with Dr. Pena.   6.   Re: ER visit 12/7/2025 due to history of  "falling.  7.   Re: CT head 12/7/2024.  No acute intracranial abnormality.  CT cervical spine showed multilevel spondylolysis without fractures or spondylolisthesis.  8.   Re: Stable for observation, splenic marginal zone lymphoma.   9.   Continue care per primary care physician and the other specialists.  10.  Plan of care discussed with patient.  Understanding expressed.  She is agreeable to proceed.  11.  Annual flu vaccine, pneumococcal vaccine every 5 years and meningococcal vaccine every 2 to 3 years. Meningococcal vaccine 10/11/2023.   12.  eRx ondansetron 8 mg p.o. every 8 hours as needed for nausea and vomiting #60, 2 refills.  13.  Return to office in 6 months CBC with differential, CMP and LDH, same day. CT chest, abdomen and pelvis as indicated.  Patient will have CT scan 6/2025 at [facility]. \"I will tel you guys what I am doing in [place] this June.\".                     I have reviewed the assessment and plan and verified the accuracy of it. No changes to assessment and plan since the information was documented. Benigno Hastings MD 03/20/25       I spent 31 total minutes, face-to-face, caring for Lucía today. Greater than 50% of this time involved counseling and/or coordination of care as documented within this note.         (Rio Pena MD [place])  (Matthew Sandoval MD)  (Trang Frank MD)  Ileana Hutchinson, ANAIS        "

## 2025-03-20 ENCOUNTER — LAB (OUTPATIENT)
Dept: LAB | Facility: HOSPITAL | Age: 65
End: 2025-03-20
Payer: COMMERCIAL

## 2025-03-20 ENCOUNTER — TRANSCRIBE ORDERS (OUTPATIENT)
Dept: ADMINISTRATIVE | Facility: HOSPITAL | Age: 65
End: 2025-03-20
Payer: COMMERCIAL

## 2025-03-20 ENCOUNTER — OFFICE VISIT (OUTPATIENT)
Dept: ONCOLOGY | Facility: CLINIC | Age: 65
End: 2025-03-20
Payer: COMMERCIAL

## 2025-03-20 VITALS
DIASTOLIC BLOOD PRESSURE: 64 MMHG | BODY MASS INDEX: 22.39 KG/M2 | WEIGHT: 126.4 LBS | SYSTOLIC BLOOD PRESSURE: 118 MMHG | OXYGEN SATURATION: 99 % | TEMPERATURE: 98.1 F | RESPIRATION RATE: 16 BRPM | HEART RATE: 76 BPM | HEIGHT: 63 IN

## 2025-03-20 DIAGNOSIS — K76.0 FATTY (CHANGE OF) LIVER, NOT ELSEWHERE CLASSIFIED: ICD-10-CM

## 2025-03-20 DIAGNOSIS — K74.69 OTHER CIRRHOSIS OF LIVER: ICD-10-CM

## 2025-03-20 DIAGNOSIS — C83.07 MARGINAL ZONE LYMPHOMA OF SPLEEN: ICD-10-CM

## 2025-03-20 DIAGNOSIS — E78.5 HYPERLIPIDEMIA, UNSPECIFIED HYPERLIPIDEMIA TYPE: ICD-10-CM

## 2025-03-20 DIAGNOSIS — E03.9 HYPOTHYROIDISM, UNSPECIFIED TYPE: ICD-10-CM

## 2025-03-20 DIAGNOSIS — E11.9 TYPE 2 DIABETES MELLITUS WITHOUT COMPLICATION, UNSPECIFIED WHETHER LONG TERM INSULIN USE: ICD-10-CM

## 2025-03-20 DIAGNOSIS — I10 ESSENTIAL (PRIMARY) HYPERTENSION: ICD-10-CM

## 2025-03-20 DIAGNOSIS — C83.07 MARGINAL ZONE LYMPHOMA OF SPLEEN: Primary | ICD-10-CM

## 2025-03-20 DIAGNOSIS — K76.0 FATTY (CHANGE OF) LIVER, NOT ELSEWHERE CLASSIFIED: Primary | ICD-10-CM

## 2025-03-20 LAB
ACANTHOCYTES BLD QL SMEAR: ABNORMAL
ALBUMIN SERPL-MCNC: 4.6 G/DL (ref 3.5–5.2)
ALBUMIN UR-MCNC: <1.2 MG/DL
ALBUMIN/GLOB SERPL: 2.1 G/DL
ALP SERPL-CCNC: 92 U/L (ref 39–117)
ALPHA-FETOPROTEIN: 3.72 NG/ML (ref 0–8.3)
ALT SERPL W P-5'-P-CCNC: 60 U/L (ref 1–33)
ANION GAP SERPL CALCULATED.3IONS-SCNC: 11 MMOL/L (ref 5–15)
AST SERPL-CCNC: 44 U/L (ref 1–32)
BASOPHILS # BLD AUTO: 0.04 10*3/MM3 (ref 0–0.2)
BASOPHILS # BLD MANUAL: 0.07 10*3/MM3 (ref 0–0.2)
BASOPHILS NFR BLD AUTO: 0.5 % (ref 0–1.5)
BASOPHILS NFR BLD MANUAL: 1 % (ref 0–1.5)
BILIRUB SERPL-MCNC: 0.4 MG/DL (ref 0–1.2)
BUN SERPL-MCNC: 9 MG/DL (ref 8–23)
BUN/CREAT SERPL: 23.1 (ref 7–25)
BURR CELLS BLD QL SMEAR: ABNORMAL
CALCIUM SPEC-SCNC: 9.5 MG/DL (ref 8.6–10.5)
CHLORIDE SERPL-SCNC: 102 MMOL/L (ref 98–107)
CHOLEST SERPL-MCNC: 187 MG/DL (ref 0–200)
CO2 SERPL-SCNC: 24 MMOL/L (ref 22–29)
CREAT SERPL-MCNC: 0.39 MG/DL (ref 0.57–1)
CREAT UR-MCNC: 91.2 MG/DL
DEPRECATED RDW RBC AUTO: 42.5 FL (ref 37–54)
EGFRCR SERPLBLD CKD-EPI 2021: 112.8 ML/MIN/1.73
ELLIPTOCYTES BLD QL SMEAR: ABNORMAL
EOSINOPHIL # BLD AUTO: 0.16 10*3/MM3 (ref 0–0.4)
EOSINOPHIL # BLD MANUAL: 0.29 10*3/MM3 (ref 0–0.4)
EOSINOPHIL NFR BLD AUTO: 2.2 % (ref 0.3–6.2)
EOSINOPHIL NFR BLD MANUAL: 4 % (ref 0.3–6.2)
ERYTHROCYTE [DISTWIDTH] IN BLOOD BY AUTOMATED COUNT: 13.3 % (ref 12.3–15.4)
GIANT PLATELETS: ABNORMAL
GLOBULIN UR ELPH-MCNC: 2.2 GM/DL
GLUCOSE SERPL-MCNC: 108 MG/DL (ref 65–99)
HBA1C MFR BLD: 5.3 % (ref 4.8–5.6)
HCT VFR BLD AUTO: 39.8 % (ref 34–46.6)
HDLC SERPL-MCNC: 70 MG/DL (ref 40–60)
HGB BLD-MCNC: 13.1 G/DL (ref 12–15.9)
IMM GRANULOCYTES # BLD AUTO: 0.02 10*3/MM3 (ref 0–0.05)
IMM GRANULOCYTES NFR BLD AUTO: 0.3 % (ref 0–0.5)
INR PPP: 0.95 (ref 0.91–1.09)
LDH SERPL-CCNC: 120 U/L (ref 135–214)
LDLC SERPL CALC-MCNC: 98 MG/DL (ref 0–100)
LDLC/HDLC SERPL: 1.36 {RATIO}
LYMPHOCYTES # BLD AUTO: 2.55 10*3/MM3 (ref 0.7–3.1)
LYMPHOCYTES # BLD MANUAL: 2.5 10*3/MM3 (ref 0.7–3.1)
LYMPHOCYTES NFR BLD AUTO: 34.6 % (ref 19.6–45.3)
LYMPHOCYTES NFR BLD MANUAL: 11 % (ref 5–12)
MCH RBC QN AUTO: 28.7 PG (ref 26.6–33)
MCHC RBC AUTO-ENTMCNC: 32.9 G/DL (ref 31.5–35.7)
MCV RBC AUTO: 87.1 FL (ref 79–97)
MICROALBUMIN/CREAT UR: NORMAL MG/G{CREAT}
MONOCYTES # BLD AUTO: 0.81 10*3/MM3 (ref 0.1–0.9)
MONOCYTES # BLD: 0.81 10*3/MM3 (ref 0.1–0.9)
MONOCYTES NFR BLD AUTO: 11 % (ref 5–12)
NEUTROPHILS # BLD AUTO: 3.68 10*3/MM3 (ref 1.7–7)
NEUTROPHILS NFR BLD AUTO: 3.78 10*3/MM3 (ref 1.7–7)
NEUTROPHILS NFR BLD AUTO: 51.4 % (ref 42.7–76)
NEUTROPHILS NFR BLD MANUAL: 50 % (ref 42.7–76)
NRBC BLD AUTO-RTO: 0 /100 WBC (ref 0–0.2)
OVALOCYTES BLD QL SMEAR: ABNORMAL
PLATELET # BLD AUTO: 378 10*3/MM3 (ref 140–450)
PMV BLD AUTO: 9.8 FL (ref 6–12)
POIKILOCYTOSIS BLD QL SMEAR: ABNORMAL
POTASSIUM SERPL-SCNC: 4 MMOL/L (ref 3.5–5.2)
PROT SERPL-MCNC: 6.8 G/DL (ref 6–8.5)
PROTHROMBIN TIME: 13.2 SECONDS (ref 11.8–14.8)
RBC # BLD AUTO: 4.57 10*6/MM3 (ref 3.77–5.28)
SMALL PLATELETS BLD QL SMEAR: ADEQUATE
SODIUM SERPL-SCNC: 137 MMOL/L (ref 136–145)
T4 FREE SERPL-MCNC: 1.06 NG/DL (ref 0.93–1.7)
TRIGL SERPL-MCNC: 109 MG/DL (ref 0–150)
TSH SERPL DL<=0.05 MIU/L-ACNC: 0.92 UIU/ML (ref 0.27–4.2)
VARIANT LYMPHS NFR BLD MANUAL: 15 % (ref 19.6–45.3)
VARIANT LYMPHS NFR BLD MANUAL: 19 % (ref 0–5)
VLDLC SERPL-MCNC: 19 MG/DL (ref 5–40)
WBC MORPH BLD: NORMAL
WBC NRBC COR # BLD AUTO: 7.36 10*3/MM3 (ref 3.4–10.8)

## 2025-03-20 PROCEDURE — 83036 HEMOGLOBIN GLYCOSYLATED A1C: CPT

## 2025-03-20 PROCEDURE — 85007 BL SMEAR W/DIFF WBC COUNT: CPT

## 2025-03-20 PROCEDURE — 85610 PROTHROMBIN TIME: CPT

## 2025-03-20 PROCEDURE — 80061 LIPID PANEL: CPT

## 2025-03-20 PROCEDURE — 84439 ASSAY OF FREE THYROXINE: CPT

## 2025-03-20 PROCEDURE — 80050 GENERAL HEALTH PANEL: CPT

## 2025-03-20 PROCEDURE — 82105 ALPHA-FETOPROTEIN SERUM: CPT

## 2025-03-20 PROCEDURE — 83615 LACTATE (LD) (LDH) ENZYME: CPT

## 2025-03-20 PROCEDURE — 82570 ASSAY OF URINE CREATININE: CPT

## 2025-03-20 PROCEDURE — 36415 COLL VENOUS BLD VENIPUNCTURE: CPT

## 2025-03-20 PROCEDURE — 82043 UR ALBUMIN QUANTITATIVE: CPT

## 2025-05-30 ENCOUNTER — TRANSCRIBE ORDERS (OUTPATIENT)
Dept: ADMINISTRATIVE | Facility: HOSPITAL | Age: 65
End: 2025-05-30
Payer: COMMERCIAL

## 2025-05-30 DIAGNOSIS — E03.9 HYPOTHYROIDISM, UNSPECIFIED TYPE: ICD-10-CM

## 2025-05-30 DIAGNOSIS — E78.5 HYPERLIPIDEMIA, UNSPECIFIED HYPERLIPIDEMIA TYPE: Primary | ICD-10-CM

## 2025-05-30 DIAGNOSIS — E11.9 TYPE 2 DIABETES MELLITUS WITHOUT COMPLICATION, UNSPECIFIED WHETHER LONG TERM INSULIN USE: ICD-10-CM

## 2025-06-09 ENCOUNTER — LAB (OUTPATIENT)
Dept: LAB | Facility: HOSPITAL | Age: 65
End: 2025-06-09
Payer: COMMERCIAL

## 2025-06-09 DIAGNOSIS — E78.5 HYPERLIPIDEMIA, UNSPECIFIED HYPERLIPIDEMIA TYPE: ICD-10-CM

## 2025-06-09 DIAGNOSIS — E11.9 TYPE 2 DIABETES MELLITUS WITHOUT COMPLICATION, UNSPECIFIED WHETHER LONG TERM INSULIN USE: ICD-10-CM

## 2025-06-09 DIAGNOSIS — E03.9 HYPOTHYROIDISM, UNSPECIFIED TYPE: ICD-10-CM

## 2025-06-09 LAB
ALBUMIN SERPL-MCNC: 4.4 G/DL (ref 3.5–5.2)
ALBUMIN UR-MCNC: <1.2 MG/DL
ALBUMIN/GLOB SERPL: 2.2 G/DL
ALP SERPL-CCNC: 93 U/L (ref 39–117)
ALT SERPL W P-5'-P-CCNC: 26 U/L (ref 1–33)
ANION GAP SERPL CALCULATED.3IONS-SCNC: 12 MMOL/L (ref 5–15)
AST SERPL-CCNC: 24 U/L (ref 1–32)
BILIRUB SERPL-MCNC: 0.4 MG/DL (ref 0–1.2)
BUN SERPL-MCNC: 15.5 MG/DL (ref 8–23)
BUN/CREAT SERPL: 31.6 (ref 7–25)
CALCIUM SPEC-SCNC: 9.4 MG/DL (ref 8.6–10.5)
CHLORIDE SERPL-SCNC: 104 MMOL/L (ref 98–107)
CHOLEST SERPL-MCNC: 219 MG/DL (ref 0–200)
CO2 SERPL-SCNC: 25 MMOL/L (ref 22–29)
CREAT SERPL-MCNC: 0.49 MG/DL (ref 0.57–1)
CREAT UR-MCNC: 97.6 MG/DL
EGFRCR SERPLBLD CKD-EPI 2021: 106.7 ML/MIN/1.73
GLOBULIN UR ELPH-MCNC: 2 GM/DL
GLUCOSE SERPL-MCNC: 177 MG/DL (ref 65–99)
HBA1C MFR BLD: 6.6 % (ref 4.8–5.6)
HDLC SERPL-MCNC: 76 MG/DL (ref 40–60)
LDLC SERPL CALC-MCNC: 117 MG/DL (ref 0–100)
LDLC/HDLC SERPL: 1.49 {RATIO}
MICROALBUMIN/CREAT UR: NORMAL MG/G{CREAT}
POTASSIUM SERPL-SCNC: 3.9 MMOL/L (ref 3.5–5.2)
PROT SERPL-MCNC: 6.4 G/DL (ref 6–8.5)
SODIUM SERPL-SCNC: 141 MMOL/L (ref 136–145)
T4 FREE SERPL-MCNC: 0.9 NG/DL (ref 0.92–1.68)
TRIGL SERPL-MCNC: 148 MG/DL (ref 0–150)
TSH SERPL DL<=0.05 MIU/L-ACNC: 1.61 UIU/ML (ref 0.27–4.2)
VLDLC SERPL-MCNC: 26 MG/DL (ref 5–40)

## 2025-06-09 PROCEDURE — 80053 COMPREHEN METABOLIC PANEL: CPT

## 2025-06-09 PROCEDURE — 36415 COLL VENOUS BLD VENIPUNCTURE: CPT

## 2025-06-09 PROCEDURE — 84443 ASSAY THYROID STIM HORMONE: CPT

## 2025-06-09 PROCEDURE — 82570 ASSAY OF URINE CREATININE: CPT

## 2025-06-09 PROCEDURE — 80061 LIPID PANEL: CPT

## 2025-06-09 PROCEDURE — 84439 ASSAY OF FREE THYROXINE: CPT

## 2025-06-09 PROCEDURE — 83036 HEMOGLOBIN GLYCOSYLATED A1C: CPT

## 2025-06-09 PROCEDURE — 82043 UR ALBUMIN QUANTITATIVE: CPT

## 2025-06-13 ENCOUNTER — TRANSCRIBE ORDERS (OUTPATIENT)
Dept: ADMINISTRATIVE | Facility: HOSPITAL | Age: 65
End: 2025-06-13
Payer: COMMERCIAL

## 2025-06-13 DIAGNOSIS — E11.9 TYPE 2 DIABETES MELLITUS WITHOUT COMPLICATION, UNSPECIFIED WHETHER LONG TERM INSULIN USE: ICD-10-CM

## 2025-06-13 DIAGNOSIS — E03.9 HYPOTHYROIDISM, UNSPECIFIED TYPE: ICD-10-CM

## 2025-06-13 DIAGNOSIS — E78.2 MIXED HYPERLIPIDEMIA: Primary | ICD-10-CM
